# Patient Record
Sex: FEMALE | Race: WHITE | Employment: UNEMPLOYED | URBAN - METROPOLITAN AREA
[De-identification: names, ages, dates, MRNs, and addresses within clinical notes are randomized per-mention and may not be internally consistent; named-entity substitution may affect disease eponyms.]

---

## 2017-04-28 RX ORDER — ALBUTEROL SULFATE 90 UG/1
2 AEROSOL, METERED RESPIRATORY (INHALATION) EVERY 6 HOURS PRN
Qty: 1 INHALER | Refills: 5 | Status: SHIPPED | OUTPATIENT
Start: 2017-04-28 | End: 2017-11-01 | Stop reason: ALTCHOICE

## 2017-05-04 ENCOUNTER — OFFICE VISIT (OUTPATIENT)
Dept: VASCULAR SURGERY | Age: 62
End: 2017-05-04

## 2017-05-04 VITALS
DIASTOLIC BLOOD PRESSURE: 78 MMHG | HEIGHT: 66 IN | SYSTOLIC BLOOD PRESSURE: 130 MMHG | BODY MASS INDEX: 31.82 KG/M2 | WEIGHT: 198 LBS

## 2017-05-04 DIAGNOSIS — I87.2 VENOUS INSUFFICIENCY OF BOTH LOWER EXTREMITIES: Primary | ICD-10-CM

## 2017-05-04 PROCEDURE — 99214 OFFICE O/P EST MOD 30 MIN: CPT | Performed by: SURGERY

## 2017-08-31 ENCOUNTER — OFFICE VISIT (OUTPATIENT)
Dept: VASCULAR SURGERY | Age: 62
End: 2017-08-31

## 2017-08-31 VITALS
SYSTOLIC BLOOD PRESSURE: 110 MMHG | BODY MASS INDEX: 32.47 KG/M2 | HEIGHT: 66 IN | DIASTOLIC BLOOD PRESSURE: 70 MMHG | WEIGHT: 202 LBS

## 2017-08-31 PROCEDURE — 29580 STRAPPING UNNA BOOT: CPT | Performed by: SURGERY

## 2017-09-14 ENCOUNTER — OFFICE VISIT (OUTPATIENT)
Dept: VASCULAR SURGERY | Age: 62
End: 2017-09-14

## 2017-09-14 VITALS
DIASTOLIC BLOOD PRESSURE: 70 MMHG | BODY MASS INDEX: 32.47 KG/M2 | WEIGHT: 202 LBS | HEIGHT: 66 IN | SYSTOLIC BLOOD PRESSURE: 138 MMHG

## 2017-09-14 PROCEDURE — 29580 STRAPPING UNNA BOOT: CPT | Performed by: SURGERY

## 2017-09-15 ENCOUNTER — TELEPHONE (OUTPATIENT)
Dept: VASCULAR SURGERY | Age: 62
End: 2017-09-15

## 2017-11-01 ENCOUNTER — TELEPHONE (OUTPATIENT)
Dept: VASCULAR SURGERY | Age: 62
End: 2017-11-01

## 2017-11-01 NOTE — TELEPHONE ENCOUNTER
The patient showed up in the office today 2 weeks late for her appt. Her left leg was wrapped very poorly in an ace wrap. When I unwrapped it the leg was extremely red, excoriated, and foul smelling green drainage. She is afebrile. She asked if she should go to ER. I called Dr Atif Tejeda to express my concerns and he feels she may need to go to ER. She prefers [place]. Her Access bus is not allowed to transfer her and a cab will cost her $20. She does not have the money. I am going to let her go home by her Access service and she will need to get a ride to hospital from there.

## 2017-11-07 ENCOUNTER — TELEPHONE (OUTPATIENT)
Dept: VASCULAR SURGERY | Age: 62
End: 2017-11-07

## 2017-11-07 NOTE — TELEPHONE ENCOUNTER
I received a call from a nurse with [facility]. Apparently they were at the home to see [place] and she was place on Doxycycline 100 mg for cellulitis. I thought she was going to [address] [place] [address] over the wknd but apparently not. I expressed my concerns of how it looked and smelled last Wednesday. A lot of drainage and foul smelling. She felt this information was very helpful and will relay this to Dr Marisabel Cannon, the visiting physician.

## 2018-02-08 LAB
AVERAGE GLUCOSE: NORMAL
HBA1C MFR BLD: 8.5 %

## 2018-04-17 ENCOUNTER — TELEPHONE (OUTPATIENT)
Dept: PULMONOLOGY | Age: 63
End: 2018-04-17

## 2018-04-17 DIAGNOSIS — J44.9 COPD, SEVERITY TO BE DETERMINED (HCC): Primary | ICD-10-CM

## 2018-05-10 ENCOUNTER — OFFICE VISIT (OUTPATIENT)
Dept: VASCULAR SURGERY | Age: 63
End: 2018-05-10

## 2018-05-10 VITALS
DIASTOLIC BLOOD PRESSURE: 70 MMHG | BODY MASS INDEX: 31.66 KG/M2 | HEIGHT: 66 IN | WEIGHT: 197 LBS | SYSTOLIC BLOOD PRESSURE: 122 MMHG

## 2018-05-10 DIAGNOSIS — I87.2 VENOUS INSUFFICIENCY OF BOTH LOWER EXTREMITIES: Primary | ICD-10-CM

## 2018-05-10 DIAGNOSIS — I89.0 LYMPHEDEMA OF BOTH LOWER EXTREMITIES: ICD-10-CM

## 2018-05-10 PROCEDURE — G8417 CALC BMI ABV UP PARAM F/U: HCPCS | Performed by: SURGERY

## 2018-05-10 PROCEDURE — 4004F PT TOBACCO SCREEN RCVD TLK: CPT | Performed by: SURGERY

## 2018-05-10 PROCEDURE — 3017F COLORECTAL CA SCREEN DOC REV: CPT | Performed by: SURGERY

## 2018-05-10 PROCEDURE — 99213 OFFICE O/P EST LOW 20 MIN: CPT | Performed by: SURGERY

## 2018-05-10 PROCEDURE — G8427 DOCREV CUR MEDS BY ELIG CLIN: HCPCS | Performed by: SURGERY

## 2018-05-10 RX ORDER — OMEPRAZOLE 40 MG/1
40 CAPSULE, DELAYED RELEASE ORAL DAILY
Status: ON HOLD | COMMUNITY
End: 2019-05-12 | Stop reason: CLARIF

## 2018-05-10 RX ORDER — TRAZODONE HYDROCHLORIDE 50 MG/1
50 TABLET ORAL NIGHTLY
Status: ON HOLD | COMMUNITY
End: 2019-05-12 | Stop reason: CLARIF

## 2018-05-10 RX ORDER — FUROSEMIDE 20 MG/1
20 TABLET ORAL 2 TIMES DAILY
Status: ON HOLD | COMMUNITY
End: 2019-11-13 | Stop reason: HOSPADM

## 2018-05-10 RX ORDER — ATORVASTATIN CALCIUM 10 MG/1
10 TABLET, FILM COATED ORAL NIGHTLY
COMMUNITY

## 2018-05-10 RX ORDER — WARFARIN SODIUM 3 MG/1
3 TABLET ORAL EVERY EVENING
Status: ON HOLD | COMMUNITY
End: 2019-05-14 | Stop reason: HOSPADM

## 2018-05-23 ENCOUNTER — HOSPITAL ENCOUNTER (OUTPATIENT)
Dept: PULMONOLOGY | Age: 63
Discharge: HOME | End: 2018-05-23
Attending: INTERNAL MEDICINE | Admitting: INTERNAL MEDICINE

## 2018-05-23 DIAGNOSIS — J44.9 CHRONIC OBSTRUCTIVE PULMONARY DISEASE (HCC): ICD-10-CM

## 2018-05-23 RX ORDER — ALBUTEROL SULFATE 2.5 MG/3ML
2.5 SOLUTION RESPIRATORY (INHALATION) ONCE
Status: COMPLETED | OUTPATIENT
Start: 2018-05-23 | End: 2018-05-23

## 2018-05-23 RX ADMIN — ALBUTEROL SULFATE 2.5 MG: 2.5 SOLUTION RESPIRATORY (INHALATION) at 11:53

## 2018-06-04 ENCOUNTER — TELEPHONE (OUTPATIENT)
Dept: PULMONOLOGY | Age: 63
End: 2018-06-04

## 2018-06-04 ENCOUNTER — OFFICE VISIT (OUTPATIENT)
Dept: PULMONOLOGY | Age: 63
End: 2018-06-04

## 2018-06-04 VITALS
WEIGHT: 206 LBS | OXYGEN SATURATION: 98 % | SYSTOLIC BLOOD PRESSURE: 110 MMHG | HEIGHT: 66 IN | DIASTOLIC BLOOD PRESSURE: 70 MMHG | BODY MASS INDEX: 33.11 KG/M2 | HEART RATE: 88 BPM

## 2018-06-04 DIAGNOSIS — J44.9 COPD, SEVERITY TO BE DETERMINED (HCC): Primary | ICD-10-CM

## 2018-06-04 DIAGNOSIS — Z72.0 TOBACCO USE: ICD-10-CM

## 2018-06-04 PROCEDURE — G8417 CALC BMI ABV UP PARAM F/U: HCPCS | Performed by: INTERNAL MEDICINE

## 2018-06-04 PROCEDURE — 4004F PT TOBACCO SCREEN RCVD TLK: CPT | Performed by: INTERNAL MEDICINE

## 2018-06-04 PROCEDURE — G8926 SPIRO NO PERF OR DOC: HCPCS | Performed by: INTERNAL MEDICINE

## 2018-06-04 PROCEDURE — 99214 OFFICE O/P EST MOD 30 MIN: CPT | Performed by: INTERNAL MEDICINE

## 2018-06-04 PROCEDURE — 3023F SPIROM DOC REV: CPT | Performed by: INTERNAL MEDICINE

## 2018-06-04 PROCEDURE — G8427 DOCREV CUR MEDS BY ELIG CLIN: HCPCS | Performed by: INTERNAL MEDICINE

## 2018-06-04 PROCEDURE — 3017F COLORECTAL CA SCREEN DOC REV: CPT | Performed by: INTERNAL MEDICINE

## 2018-06-04 RX ORDER — FLUTICASONE FUROATE AND VILANTEROL 200; 25 UG/1; UG/1
1 POWDER RESPIRATORY (INHALATION) DAILY
Qty: 1 EACH | Refills: 11 | Status: ON HOLD | OUTPATIENT
Start: 2018-06-04 | End: 2019-09-18

## 2018-07-21 ENCOUNTER — APPOINTMENT (OUTPATIENT)
Dept: GENERAL RADIOLOGY | Age: 63
End: 2018-07-21
Payer: COMMERCIAL

## 2018-07-21 ENCOUNTER — HOSPITAL ENCOUNTER (EMERGENCY)
Age: 63
Discharge: HOME | End: 2018-07-21
Attending: EMERGENCY MEDICINE
Payer: COMMERCIAL

## 2018-07-21 VITALS
WEIGHT: 190 LBS | TEMPERATURE: 97.5 F | RESPIRATION RATE: 16 BRPM | OXYGEN SATURATION: 99 % | HEIGHT: 65 IN | HEART RATE: 89 BPM | SYSTOLIC BLOOD PRESSURE: 94 MMHG | DIASTOLIC BLOOD PRESSURE: 66 MMHG | BODY MASS INDEX: 31.65 KG/M2

## 2018-07-21 DIAGNOSIS — R11.2 NAUSEA AND VOMITING, INTRACTABILITY OF VOMITING NOT SPECIFIED, UNSPECIFIED VOMITING TYPE: Primary | ICD-10-CM

## 2018-07-21 DIAGNOSIS — N39.0 URINARY TRACT INFECTION WITHOUT HEMATURIA, SITE UNSPECIFIED: ICD-10-CM

## 2018-07-21 DIAGNOSIS — K59.00 CONSTIPATION, UNSPECIFIED CONSTIPATION TYPE: ICD-10-CM

## 2018-07-21 LAB
ALBUMIN SERPL-MCNC: 3.7 G/DL (ref 3.4–5)
ALP BLD-CCNC: 162 U/L (ref 40–129)
ALT SERPL-CCNC: 18 U/L (ref 10–40)
AST SERPL-CCNC: 20 U/L (ref 15–37)
BACTERIA: ABNORMAL /HPF
BASE EXCESS VENOUS: 15 (ref -3–3)
BASOPHILS ABSOLUTE: 0 K/UL (ref 0–0.2)
BASOPHILS RELATIVE PERCENT: 0.4 %
BILIRUB SERPL-MCNC: <0.2 MG/DL (ref 0–1)
BILIRUBIN DIRECT: <0.2 MG/DL (ref 0–0.3)
BILIRUBIN URINE: NEGATIVE MG/DL
BILIRUBIN, INDIRECT: ABNORMAL MG/DL (ref 0–1)
BLOOD, URINE: ABNORMAL
CALCIUM IONIZED: 1.07 MMOL/L (ref 1.12–1.32)
CLARITY: ABNORMAL
CO2: 33 MMOL/L (ref 21–32)
COLOR: ABNORMAL
EOSINOPHILS ABSOLUTE: 0.1 K/UL (ref 0–0.6)
EOSINOPHILS RELATIVE PERCENT: 0.8 %
GFR AFRICAN AMERICAN: >60
GFR NON-AFRICAN AMERICAN: >60
GLUCOSE BLD-MCNC: 225 MG/DL (ref 70–99)
GLUCOSE URINE: NEGATIVE MG/DL
HCO3 VENOUS: 38.1 MMOL/L (ref 23–29)
HCT VFR BLD CALC: 40.3 % (ref 36–48)
HEMOGLOBIN: 13.2 G/DL (ref 12–16)
KETONES, URINE: NEGATIVE MG/DL
LACTATE: 2.26 MMOL/L (ref 0.4–2)
LACTATE: 2.3 MMOL/L (ref 0.4–2)
LEUKOCYTE ESTERASE, URINE: ABNORMAL
LIPASE: 30 U/L (ref 13–60)
LYMPHOCYTES ABSOLUTE: 2.2 K/UL (ref 1–5.1)
LYMPHOCYTES RELATIVE PERCENT: 23.9 %
MCH RBC QN AUTO: 29.8 PG (ref 26–34)
MCHC RBC AUTO-ENTMCNC: 32.7 G/DL (ref 31–36)
MCV RBC AUTO: 90.9 FL (ref 80–100)
MICROSCOPIC EXAMINATION: YES
MONOCYTES ABSOLUTE: 0.9 K/UL (ref 0–1.3)
MONOCYTES RELATIVE PERCENT: 9.7 %
NEUTROPHILS ABSOLUTE: 5.9 K/UL (ref 1.7–7.7)
NEUTROPHILS RELATIVE PERCENT: 65.2 %
NITRITE, URINE: NEGATIVE
O2 SAT, VEN: 36 %
PCO2, VEN: 50.2 MM HG (ref 40–50)
PDW BLD-RTO: 17.7 % (ref 12.4–15.4)
PERFORMED ON: ABNORMAL
PERFORMED ON: NORMAL
PERFORMED ON: NORMAL
PH UA: 8
PH VENOUS: 7.49 (ref 7.35–7.45)
PLATELET # BLD: 248 K/UL (ref 135–450)
PMV BLD AUTO: 8.8 FL (ref 5–10.5)
PO2, VEN: 20 MM HG
POC ANION GAP: 10 (ref 10–20)
POC BUN: 16 MG/DL (ref 7–18)
POC CHLORIDE: 94 MMOL/L (ref 99–110)
POC CREATININE: 0.9 MG/DL (ref 0.6–1.2)
POC POTASSIUM: 4.2 MMOL/L (ref 3.5–5.1)
POC SAMPLE TYPE: ABNORMAL
POC SAMPLE TYPE: NORMAL
POC SAMPLE TYPE: NORMAL
POC SODIUM: 137 MMOL/L (ref 136–145)
POC TROPONIN I: 0 NG/ML (ref 0–0.1)
POC TROPONIN I: 0 NG/ML (ref 0–0.1)
PROTEIN UA: NEGATIVE MG/DL
RBC # BLD: 4.44 M/UL (ref 4–5.2)
RBC UA: ABNORMAL /HPF (ref 0–2)
SPECIFIC GRAVITY UA: 1.01
TCO2 CALC VENOUS: 40 MMOL/L
TOTAL PROTEIN: 7.1 G/DL (ref 6.4–8.2)
UROBILINOGEN, URINE: 0.2 E.U./DL
WBC # BLD: 9 K/UL (ref 4–11)
WBC UA: ABNORMAL /HPF (ref 0–5)

## 2018-07-21 PROCEDURE — 85025 COMPLETE CBC W/AUTO DIFF WBC: CPT

## 2018-07-21 PROCEDURE — 80047 BASIC METABLC PNL IONIZED CA: CPT

## 2018-07-21 PROCEDURE — 84484 ASSAY OF TROPONIN QUANT: CPT

## 2018-07-21 PROCEDURE — 96361 HYDRATE IV INFUSION ADD-ON: CPT

## 2018-07-21 PROCEDURE — 74022 RADEX COMPL AQT ABD SERIES: CPT

## 2018-07-21 PROCEDURE — 83605 ASSAY OF LACTIC ACID: CPT

## 2018-07-21 PROCEDURE — 80076 HEPATIC FUNCTION PANEL: CPT

## 2018-07-21 PROCEDURE — 87086 URINE CULTURE/COLONY COUNT: CPT

## 2018-07-21 PROCEDURE — 6370000000 HC RX 637 (ALT 250 FOR IP): Performed by: EMERGENCY MEDICINE

## 2018-07-21 PROCEDURE — 93005 ELECTROCARDIOGRAM TRACING: CPT | Performed by: EMERGENCY MEDICINE

## 2018-07-21 PROCEDURE — 82803 BLOOD GASES ANY COMBINATION: CPT

## 2018-07-21 PROCEDURE — 81001 URINALYSIS AUTO W/SCOPE: CPT

## 2018-07-21 PROCEDURE — 2580000003 HC RX 258: Performed by: EMERGENCY MEDICINE

## 2018-07-21 PROCEDURE — 6360000002 HC RX W HCPCS: Performed by: EMERGENCY MEDICINE

## 2018-07-21 PROCEDURE — 96374 THER/PROPH/DIAG INJ IV PUSH: CPT

## 2018-07-21 PROCEDURE — 99284 EMERGENCY DEPT VISIT MOD MDM: CPT

## 2018-07-21 PROCEDURE — 83690 ASSAY OF LIPASE: CPT

## 2018-07-21 RX ORDER — CEPHALEXIN 500 MG/1
500 CAPSULE ORAL 4 TIMES DAILY
Qty: 12 CAPSULE | Refills: 0 | Status: SHIPPED | OUTPATIENT
Start: 2018-07-21 | End: 2018-07-24

## 2018-07-21 RX ORDER — ONDANSETRON 2 MG/ML
4 INJECTION INTRAMUSCULAR; INTRAVENOUS ONCE
Status: COMPLETED | OUTPATIENT
Start: 2018-07-21 | End: 2018-07-21

## 2018-07-21 RX ORDER — ONDANSETRON 4 MG/1
4 TABLET, FILM COATED ORAL EVERY 8 HOURS PRN
Qty: 20 TABLET | Refills: 0 | Status: ON HOLD | OUTPATIENT
Start: 2018-07-21 | End: 2019-05-12 | Stop reason: CLARIF

## 2018-07-21 RX ORDER — CEPHALEXIN 500 MG/1
500 CAPSULE ORAL ONCE
Status: COMPLETED | OUTPATIENT
Start: 2018-07-21 | End: 2018-07-21

## 2018-07-21 RX ORDER — POLYETHYLENE GLYCOL 3350 17 G/17G
17 POWDER, FOR SOLUTION ORAL DAILY
Qty: 1 BOTTLE | Refills: 0 | Status: SHIPPED | OUTPATIENT
Start: 2018-07-21 | End: 2018-07-28

## 2018-07-21 RX ORDER — 0.9 % SODIUM CHLORIDE 0.9 %
30 INTRAVENOUS SOLUTION INTRAVENOUS ONCE
Status: COMPLETED | OUTPATIENT
Start: 2018-07-21 | End: 2018-07-21

## 2018-07-21 RX ADMIN — ONDANSETRON 4 MG: 2 INJECTION INTRAMUSCULAR; INTRAVENOUS at 20:35

## 2018-07-21 RX ADMIN — SODIUM CHLORIDE 2586 ML: 9 INJECTION, SOLUTION INTRAVENOUS at 18:18

## 2018-07-21 RX ADMIN — CEPHALEXIN 500 MG: 500 CAPSULE ORAL at 20:43

## 2018-07-21 ASSESSMENT — ENCOUNTER SYMPTOMS
SHORTNESS OF BREATH: 0
CONSTIPATION: 0
ABDOMINAL PAIN: 1
NAUSEA: 1
COUGH: 0
VOMITING: 1
DIARRHEA: 0

## 2018-07-21 NOTE — ED PROVIDER NOTES
that she does not drink alcohol or use drugs. Medications     Previous Medications    ARIPIPRAZOLE (ABILIFY) 5 MG TABLET    Take 5 mg by mouth nightly. ATORVASTATIN (LIPITOR) 10 MG TABLET    Take 10 mg by mouth daily    CLOZAPINE (CLOZARIL) 100 MG TABLET    Take 200 mg by mouth 2 times daily. FERROUS SULFATE 325 (65 FE) MG TABLET    Take 325 mg by mouth daily (with breakfast)    FLUTICASONE FUROATE-VILANTEROL 200-25 MCG/INH AEPB    Inhale 1 puff into the lungs daily    FUROSEMIDE (LASIX) 20 MG TABLET    Take 20 mg by mouth 2 times daily    GLIMEPIRIDE (AMARYL) 1 MG TABLET    Take 1 mg by mouth every morning (before breakfast)    LEVOTHYROXINE (SYNTHROID) 50 MCG TABLET    Take 50 mcg by mouth Daily    LISINOPRIL (PRINIVIL;ZESTRIL) 2.5 MG TABLET    Take 2.5 mg by mouth daily    OMEPRAZOLE (PRILOSEC) 20 MG DELAYED RELEASE CAPSULE    Take 20 mg by mouth daily    SPIRONOLACTONE (ALDACTONE) 25 MG TABLET    Take 1 tablet by mouth daily    THERAPEUTIC MULTIVITAMIN-MINERALS (THERAGRAN-M) TABLET    Take 1 tablet by mouth daily. TIOTROPIUM (SPIRIVA RESPIMAT) 2.5 MCG/ACT AERS INHALER    Inhale 2 puffs into the lungs daily    TRAZODONE (DESYREL) 50 MG TABLET    Take 50 mg by mouth nightly    VALPROIC ACID (DEPAKENE) 250 MG/5ML SYRP    Take 500 mg by mouth nightly. VITAMIN B-12 (CYANOCOBALAMIN) 1000 MCG TABLET    Take 1,000 mcg by mouth daily    VITAMIN D (CHOLECALCIFEROL) 1000 UNITS CAPS CAPSULE    Take 2,000 Units by mouth nightly. WARFARIN (COUMADIN) 2.5 MG TABLET    Take 2.5 mg by mouth       Allergies     She has No Known Allergies. Physical Exam     INITIAL VITALS: BP: 91/69, Temp: 97.5 °F (36.4 °C), Pulse: 99, Resp: 17, SpO2: 99 %   Physical Exam   Constitutional: She is oriented to person, place, and time. She appears well-developed and well-nourished. No distress. Cardiovascular: Normal rate and regular rhythm. Pulmonary/Chest: Effort normal and breath sounds normal.   Abdominal: Soft.  Bowel Lipase 30.0 13.0 - 60.0 U/L   Microscopic Urinalysis   Result Value Ref Range    WBC, UA 10-20 (A) 0 - 5 /HPF    RBC, UA 3-5 (A) 0 - 2 /HPF    Bacteria, UA 1+ (A) /HPF   POCT Venous   Result Value Ref Range    pH, Stevie 7.488 (H) 7.350 - 7.450    pCO2, Stevie 50.2 (H) 40.0 - 50.0 mm Hg    pO2, Stevie 20 Not Established mm Hg    HCO3, Venous 38.1 (H) 23.0 - 29.0 mmol/L    Base Excess, Stevie 15 (H) -3 - 3    O2 Sat, Stevie 36 Not Established %    TC02 (Calc), Stevie 40 Not Established mmol/L    Lactate 2.30 (H) 0.40 - 2.00 mmol/L    Sample Type STEVIE     Performed on SEE BELOW    POCT Venous   Result Value Ref Range    POC Sodium 137 136 - 145 mmol/L    POC Potassium 4.2 3.5 - 5.1 mmol/L    POC Chloride 94 (L) 99 - 110 mmol/L    CO2 33 (H) 21 - 32 mmol/L    POC Anion Gap 10 10 - 20    POC Glucose 225 (H) 70 - 99 mg/dl    POC BUN 16 7 - 18 mg/dL    POC Creatinine 0.9 0.6 - 1.2 mg/dL    GFR Non-African American >60 >60    GFR African American >60     Calcium, Ion 1.07 (L) 1.12 - 1.32 mmol/L    Sample Type STEVIE     Performed on SEE BELOW    POCT Venous   Result Value Ref Range    POC Troponin I 0.00 0.00 - 0.10 ng/mL    Sample Type STEVIE     Performed on SEE BELOW    POC URINE with Microscopic   Result Value Ref Range    Color, UA Not Entered Straw/Yellow    Clarity, UA Not Entered Clear    Glucose, Ur Negative Negative mg/dL    Bilirubin Urine Negative Negative mg/dL    Ketones, Urine Negative Negative mg/dL    Specific Gravity, UA 1.015 1.005 - 1.030    Blood, Urine TRACE-INTACT (A) Negative    pH, UA 8.0 5.0 - 8.0    Protein, UA Negative Negative mg/dL    Urobilinogen, Urine 0.2 <2.0 E.U./dL    Nitrite, Urine Negative Negative    Leukocyte Esterase, Urine SMALL (A) Negative    Microscopic Examination Yes    POCT Venous   Result Value Ref Range    Lactate 2.26 (H) 0.40 - 2.00 mmol/L    Sample Type STEVIE     Performed on SEE BELOW    POCT Venous   Result Value Ref Range    POC Troponin I 0.00 0.00 - 0.10 ng/mL    Sample Type STEVIE     Performed on SEE BELOW        RECENT VITALS:  BP: 91/69, Temp: 97.5 °F (36.4 °C), Pulse: 99, Resp: 17, SpO2: 99 %       ED Course     Nursing Notes, Past Medical Hx, Past Surgical Hx, Social Hx, Allergies, and Family Hx were reviewed. The patient was given the following medications:  Orders Placed This Encounter   Medications    0.9 % sodium chloride IV bolus 2,586 mL    ondansetron (ZOFRAN) injection 4 mg    cephALEXin (KEFLEX) capsule 500 mg    cephALEXin (KEFLEX) 500 MG capsule     Sig: Take 1 capsule by mouth 4 times daily for 3 days     Dispense:  12 capsule     Refill:  0    ondansetron (ZOFRAN) 4 MG tablet     Sig: Take 1 tablet by mouth every 8 hours as needed for Nausea     Dispense:  20 tablet     Refill:  0    polyethylene glycol (MIRALAX) powder     Sig: Take 17 g by mouth daily for 7 days PRN constipation     Dispense:  1 Bottle     Refill:  0       CONSULTS:  None    MEDICAL DECISION MAKING / ASSESSMENT / Talha Jamison is a 63 y.o. female presenting with nausea and vomiting and abdominal discomfort with a very benign exam.  She was found to have a large amount of stool on xray but denies constipation. She has normal lab evaluation and unremarkable ECG/tropx2. Her chest pain appears to really be abdominal discomfort which I think is related to constipation. She has a UTI and tolerated her first dose of antibiotics and did not vomit. She feels comfortable going home and is asking for a cab. She will be prescribed zofran for the nausea. Clinical Impression     1. Nausea and vomiting, intractability of vomiting not specified, unspecified vomiting type    2. Constipation, unspecified constipation type    3.  Urinary tract infection without hematuria, site unspecified        Disposition     PATIENT REFERRED TO:  Lili Pritchard, APRN - CNP  [address] [facility] [address]  [phone]      in the next few days      DISCHARGE MEDICATIONS:  New Prescriptions    CEPHALEXIN (KEFLEX) 500 MG

## 2018-07-21 NOTE — ED NOTES
Bed: A12-12  Expected date:   Expected time:   Means of arrival:   Comments:  Libby Goldsmith RN  07/21/18 [id number]

## 2018-07-23 LAB — URINE CULTURE, ROUTINE: NORMAL

## 2018-08-15 LAB
EKG ATRIAL RATE: 108 BPM
EKG DIAGNOSIS: NORMAL
EKG P AXIS: 55 DEGREES
EKG P-R INTERVAL: 130 MS
EKG Q-T INTERVAL: 354 MS
EKG QRS DURATION: 88 MS
EKG QTC CALCULATION (BAZETT): 474 MS
EKG R AXIS: -71 DEGREES
EKG T AXIS: 61 DEGREES
EKG VENTRICULAR RATE: 108 BPM

## 2018-09-22 ENCOUNTER — APPOINTMENT (OUTPATIENT)
Dept: GENERAL RADIOLOGY | Age: 63
End: 2018-09-22
Payer: COMMERCIAL

## 2018-09-22 ENCOUNTER — APPOINTMENT (OUTPATIENT)
Dept: CT IMAGING | Age: 63
End: 2018-09-22
Payer: COMMERCIAL

## 2018-09-22 ENCOUNTER — HOSPITAL ENCOUNTER (EMERGENCY)
Age: 63
Discharge: HOME | End: 2018-09-22
Attending: EMERGENCY MEDICINE
Payer: COMMERCIAL

## 2018-09-22 VITALS
RESPIRATION RATE: 18 BRPM | OXYGEN SATURATION: 99 % | TEMPERATURE: 98 F | DIASTOLIC BLOOD PRESSURE: 49 MMHG | SYSTOLIC BLOOD PRESSURE: 118 MMHG | HEART RATE: 97 BPM
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
## 2018-09-22 DIAGNOSIS — M25.561 CHRONIC PAIN OF BOTH KNEES: ICD-10-CM

## 2018-09-22 DIAGNOSIS — M79.642 PAIN IN BOTH HANDS: ICD-10-CM

## 2018-09-22 DIAGNOSIS — M79.641 PAIN IN BOTH HANDS: ICD-10-CM

## 2018-09-22 DIAGNOSIS — S09.90XA INJURY OF HEAD, INITIAL ENCOUNTER: ICD-10-CM

## 2018-09-22 DIAGNOSIS — G89.29 CHRONIC PAIN OF BOTH KNEES: ICD-10-CM

## 2018-09-22 DIAGNOSIS — M25.562 CHRONIC PAIN OF BOTH KNEES: ICD-10-CM

## 2018-09-22 PROCEDURE — 93005 ELECTROCARDIOGRAM TRACING: CPT | Performed by: EMERGENCY MEDICINE

## 2018-09-22 PROCEDURE — 72125 CT NECK SPINE W/O DYE: CPT

## 2018-09-22 PROCEDURE — 6370000000 HC RX 637 (ALT 250 FOR IP): Performed by: EMERGENCY MEDICINE

## 2018-09-22 PROCEDURE — 73560 X-RAY EXAM OF KNEE 1 OR 2: CPT

## 2018-09-22 PROCEDURE — 73130 X-RAY EXAM OF HAND: CPT

## 2018-09-22 PROCEDURE — 99285 EMERGENCY DEPT VISIT HI MDM: CPT

## 2018-09-22 PROCEDURE — 70450 CT HEAD/BRAIN W/O DYE: CPT

## 2018-09-22 RX ORDER — ACETAMINOPHEN 500 MG
1000 TABLET ORAL ONCE
Status: COMPLETED | OUTPATIENT
Start: 2018-09-22 | End: 2018-09-22

## 2018-09-22 RX ADMIN — ACETAMINOPHEN 1000 MG: 500 TABLET, COATED ORAL at 12:16

## 2018-09-22 ASSESSMENT — PAIN DESCRIPTION - DESCRIPTORS: DESCRIPTORS: ACHING

## 2018-09-22 ASSESSMENT — ENCOUNTER SYMPTOMS
ABDOMINAL PAIN: 0
DIARRHEA: 0
VOMITING: 0
SHORTNESS OF BREATH: 0
NAUSEA: 0
COUGH: 0
BACK PAIN: 0
RHINORRHEA: 0

## 2018-09-22 ASSESSMENT — PAIN SCALES - GENERAL: PAINLEVEL_OUTOF10: 10

## 2018-09-22 ASSESSMENT — PAIN DESCRIPTION - LOCATION: LOCATION: HEAD

## 2018-09-22 ASSESSMENT — PAIN DESCRIPTION - FREQUENCY: FREQUENCY: CONTINUOUS

## 2018-09-22 ASSESSMENT — PAIN DESCRIPTION - PAIN TYPE: TYPE: ACUTE PAIN

## 2018-09-22 NOTE — ED NOTES
Bed: B16-16  Expected date:   Expected time:   Means of arrival:   Comments:  [address], RN  09/22/18 0238
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
## 2018-09-22 NOTE — ED PROVIDER NOTES
[address] ENCOUNTER          ATTENDING PHYSICIAN NOTE     Date of evaluation: 9/22/2018    Chief Complaint     Fall      History of Present Illness     Tracee Swanson is a 63 y.o. female with a history of schizophrenia, DM2, COPD, and CKD who presents after a fall. Thinks tripped on clothing walking through living room. Hit head on wall. Denies LOC. Complaining of headache, neck pain, B hand, and B knee pain. EMS evaluated and initially refused transport. ?second fall, patient does not recall. EMS called again and brought to ED. Denies fever, chills, chest pain, dyspnea, abdominal pain, nausea, vomiting, BM changes, urinary symptoms, or other complaints. Review of Systems     Review of Systems   Constitutional: Negative for chills and fever. HENT: Negative for congestion and rhinorrhea. Respiratory: Negative for cough and shortness of breath. Cardiovascular: Negative for chest pain and palpitations. Gastrointestinal: Negative for abdominal pain, diarrhea, nausea and vomiting. Genitourinary: Negative for dysuria, frequency and urgency. Musculoskeletal: Positive for arthralgias and neck pain. Negative for back pain. Skin: Negative for rash and wound. Neurological: Positive for headaches. Negative for syncope. Psychiatric/Behavioral: Negative for agitation and confusion. Past Medical, Surgical, Family, and Social History     She has a past medical history of Chronic mental illness; Chronic pain; CKD (chronic kidney disease) stage 1, GFR 90 ml/min or greater; COPD (chronic obstructive pulmonary disease) ([address] Depression; Diabetes mellitus, type 2 ([address] Diabetic neuropathy ([address] GERD (gastroesophageal reflux disease); History of DVT (deep vein thrombosis); History of primary hyperparathyroidism; Hypertension; Lymphedema of leg; Nasal bone fracture; Normal stress echocardiogram; OA (osteoarthritis); and Schizophrenia ([address]   She has a past surgical history that includes Tubal ligation; Colonoscopy (3/14/2012); and joint replacement (Left). Her Family history is unknown by patient. She reports that she has been smoking Cigarettes. She has been smoking about 0.50 packs per day. She has never used smokeless tobacco. She reports that she does not drink alcohol or use drugs. Medications     Discharge Medication List as of 9/22/2018  2:08 PM      CONTINUE these medications which have NOT CHANGED    Details   ondansetron (ZOFRAN) 4 MG tablet Take 1 tablet by mouth every 8 hours as needed for Nausea, Disp-20 tablet, R-0Print      tiotropium (SPIRIVA RESPIMAT) 2.5 MCG/ACT AERS inhaler Inhale 2 puffs into the lungs daily, Disp-1 Inhaler, R-11Normal      Fluticasone Furoate-Vilanterol 200-25 MCG/INH AEPB Inhale 1 puff into the lungs daily, Disp-1 each, R-11Normal      atorvastatin (LIPITOR) 10 MG tablet Take 10 mg by mouth dailyHistorical Med      furosemide (LASIX) 20 MG tablet Take 20 mg by mouth 2 times dailyHistorical Med      traZODone (DESYREL) 50 MG tablet Take 50 mg by mouth nightlyHistorical Med      omeprazole (PRILOSEC) 20 MG delayed release capsule Take 20 mg by mouth dailyHistorical Med      warfarin (COUMADIN) 2.5 MG tablet Take 2.5 mg by mouthHistorical Med      glimepiride (AMARYL) 1 MG tablet Take 1 mg by mouth every morning (before breakfast)Historical Med      lisinopril (PRINIVIL;ZESTRIL) 2.5 MG tablet Take 2.5 mg by mouth dailyHistorical Med      ferrous sulfate 325 (65 Fe) MG tablet Take 325 mg by mouth daily (with breakfast)Historical Med      vitamin B-12 (CYANOCOBALAMIN) 1000 MCG tablet Take 1,000 mcg by mouth daily      spironolactone (ALDACTONE) 25 MG tablet Take 1 tablet by mouth daily, Disp-30 tablet, R-3      levothyroxine (SYNTHROID) 50 MCG tablet Take 50 mcg by mouth Daily      cloZAPine (CLOZARIL) 100 MG tablet Take 200 mg by mouth 2 times daily. valproic acid (DEPAKENE) 250 MG/5ML SYRP Take 500 mg by mouth nightly. ARIPiprazole (ABILIFY) 5 MG tablet Take 5 mg by mouth nightly. Vitamin D (CHOLECALCIFEROL) 1000 UNITS CAPS capsule Take 2,000 Units by mouth nightly. therapeutic multivitamin-minerals (THERAGRAN-M) tablet Take 1 tablet by mouth daily. Allergies     She has No Known Allergies. Physical Exam     INITIAL VITALS: BP: (!) 118/49, Temp: 98 °F (36.7 °C), Pulse: 97, Resp: 18, SpO2: 99 %   Physical Exam   Constitutional: She is oriented to person, place, and time. She appears well-developed and well-nourished. No distress. HENT:   Head: Normocephalic and atraumatic. Eyes: EOM are normal. No scleral icterus. Neck: Normal range of motion. No tracheal deviation present. Cardiovascular: Normal rate, regular rhythm, normal heart sounds and intact distal pulses. Exam reveals no gallop and no friction rub. No murmur heard. Pulmonary/Chest: Effort normal and breath sounds normal. No respiratory distress. She has no wheezes. She has no rales. Abdominal: Soft. She exhibits no distension. There is no tenderness. There is no rebound and no guarding. Musculoskeletal: Normal range of motion. She exhibits no edema. Mild diffuse B hand TTP  Easily palpable radial pulse  Motor function intact AIN/PIN/ulnar distribution  Sensation intact median/radial/ulnar distribution  Mild diffuse B knee TTP  Normal knee/ankle flexion/extension  SILT throughout   Neurological: She is alert and oriented to person, place, and time. No cranial nerve deficit. Skin: Skin is warm. No rash noted. She is not diaphoretic. Psychiatric: She has a normal mood and affect. Her behavior is normal.   Nursing note and vitals reviewed. Diagnostic Results     EKG   N/A    RADIOLOGY:  XR HAND LEFT (MIN 3 VIEWS)   Final Result      No evidence of acute fracture. XR HAND RIGHT (MIN 3 VIEWS)   Final Result      No evidence of acute fracture. XR KNEE LEFT (1-2 VIEWS)   Final Result      1.   No evidence of acute fracture. 2.  Generalized swelling. XR KNEE RIGHT (1-2 VIEWS)   Final Result      1. No evidence of acute fracture. 2.  Small knee swelling. 3.  Severe degenerative changes. CT CERVICAL SPINE WO CONTRAST   Final Result      No acute bony pathology seen. Significant degenerative changes at C5-C6      CT Head WO Contrast   Final Result      No acute intracranial pathology                  LABS:   No results found for this visit on 09/22/18. ED BEDSIDE ULTRASOUND:  N/A    RECENT VITALS:  BP: (!) 118/49, Temp: 98 °F (36.7 °C), Pulse: 97, Resp: 18, SpO2: 99 %     Procedures     N/A    MEDICAL DECISION MAKING / ASSESSMENT / [place] Bashir is a 63 y.o. female with a history of schizophrenia, DM2, COPD, and CKD who presents after a fall. Symptoms and exam most consistent with contusions. Doubt ICH. Doubt Cspine fracture. Doubt hand/knee fractures. Doubt other significant traumatic injuries. Plan: CT head/Cspine, XR B hand/knee, pain control    ED Course     Nursing Notes, Past Medical Hx, Past Surgical Hx, Social Hx, Allergies, and Family Hx were reviewed. The patient was given the following medications:  Orders Placed This Encounter   Medications    acetaminophen (TYLENOL) tablet 1,000 mg       CONSULTS:  None    ED Course as of Sep 22 1756   Sat Sep 22, 2018   1224 ECG NSR at 91bpm, no ST/T changes though significant artifact  [AZ]   1251 Neg acute CT Head WO Contrast [AZ]   1308 Neg acute CT CERVICAL SPINE WO CONTRAST [AZ]   1308 Neg acute XR KNEE RIGHT (1-2 VIEWS) [AZ]   1308 Neg acute XR KNEE LEFT (1-2 VIEWS) [AZ]   1308 Neg acute XR HAND RIGHT (MIN 3 VIEWS) [AZ]   1308 Neg acute XR HAND LEFT (MIN 3 VIEWS) [AZ]   1321 Patient remains well-appearing. Suspect mechanical fall without significant injury. Will discharge with PCP follow-up.  Verbal and written discharge instructions given to and understood by patient.  Abhishek Sherman      ED Course User Index  [AZ] Dayami Catherine MD Clinical Impression     1. Fall, initial encounter    2. Injury of head, initial encounter    3. Pain in both hands    4.  Chronic pain of both knees        Disposition     PATIENT REFERRED TO:  Sangita Leon, APRN - CNP  [place] [address]  [address] [facility] [address]  [phone]    Schedule an appointment as soon as possible for a visit in 3 days  As needed    [facility] Emergency Department  [address] [place], [place] [id number]  [phone]  Go to   If symptoms worsen -- confusion, significantly increased pain, or other concerns      DISCHARGE MEDICATIONS:  Discharge Medication List as of 9/22/2018  2:08 PM          DISPOSITION Decision To Discharge 09/22/2018 01:17:35 PM      Leonides Ca MD  09/22/18 0936

## 2018-10-08 LAB
EKG ATRIAL RATE: 91 BPM
EKG DIAGNOSIS: NORMAL
EKG P AXIS: 73 DEGREES
EKG P-R INTERVAL: 144 MS
EKG Q-T INTERVAL: 404 MS
EKG QRS DURATION: 90 MS
EKG QTC CALCULATION (BAZETT): 496 MS
EKG R AXIS: -64 DEGREES
EKG T AXIS: 42 DEGREES
EKG VENTRICULAR RATE: 91 BPM

## 2019-02-05 ENCOUNTER — OFFICE VISIT (OUTPATIENT)
Dept: PULMONOLOGY | Age: 64
End: 2019-02-05
Payer: COMMERCIAL

## 2019-02-05 VITALS
HEIGHT: 65 IN | WEIGHT: 207 LBS | DIASTOLIC BLOOD PRESSURE: 70 MMHG | OXYGEN SATURATION: 98 % | BODY MASS INDEX: 34.49 KG/M2 | SYSTOLIC BLOOD PRESSURE: 110 MMHG | HEART RATE: 97 BPM

## 2019-02-05 DIAGNOSIS — J44.9 COPD, SEVERITY TO BE DETERMINED (HCC): Primary | ICD-10-CM

## 2019-02-05 DIAGNOSIS — Z72.0 TOBACCO USE: ICD-10-CM

## 2019-02-05 PROCEDURE — G8417 CALC BMI ABV UP PARAM F/U: HCPCS | Performed by: INTERNAL MEDICINE

## 2019-02-05 PROCEDURE — 4004F PT TOBACCO SCREEN RCVD TLK: CPT | Performed by: INTERNAL MEDICINE

## 2019-02-05 PROCEDURE — G8484 FLU IMMUNIZE NO ADMIN: HCPCS | Performed by: INTERNAL MEDICINE

## 2019-02-05 PROCEDURE — 3023F SPIROM DOC REV: CPT | Performed by: INTERNAL MEDICINE

## 2019-02-05 PROCEDURE — 99214 OFFICE O/P EST MOD 30 MIN: CPT | Performed by: INTERNAL MEDICINE

## 2019-02-05 PROCEDURE — G8926 SPIRO NO PERF OR DOC: HCPCS | Performed by: INTERNAL MEDICINE

## 2019-02-05 PROCEDURE — G8427 DOCREV CUR MEDS BY ELIG CLIN: HCPCS | Performed by: INTERNAL MEDICINE

## 2019-02-05 PROCEDURE — 3017F COLORECTAL CA SCREEN DOC REV: CPT | Performed by: INTERNAL MEDICINE

## 2019-02-05 RX ORDER — NICOTINE 21 MG/24HR
1 PATCH, TRANSDERMAL 24 HOURS TRANSDERMAL EVERY 24 HOURS
Qty: 14 PATCH | Refills: 0 | Status: ON HOLD | OUTPATIENT
Start: 2019-02-05 | End: 2019-05-12 | Stop reason: CLARIF

## 2019-04-04 ENCOUNTER — OFFICE VISIT (OUTPATIENT)
Dept: VASCULAR SURGERY | Age: 64
End: 2019-04-04
Payer: COMMERCIAL

## 2019-04-04 VITALS
SYSTOLIC BLOOD PRESSURE: 136 MMHG | BODY MASS INDEX: 33.43 KG/M2 | WEIGHT: 208 LBS | DIASTOLIC BLOOD PRESSURE: 80 MMHG | HEIGHT: 66 IN

## 2019-04-04 DIAGNOSIS — L97.929 VENOUS HYPERTENSION, CHRONIC, WITH ULCER AND INFLAMMATION, BILATERAL (HCC): Primary | ICD-10-CM

## 2019-04-04 DIAGNOSIS — L97.919 VENOUS HYPERTENSION, CHRONIC, WITH ULCER AND INFLAMMATION, BILATERAL (HCC): Primary | ICD-10-CM

## 2019-04-04 DIAGNOSIS — I87.333 VENOUS HYPERTENSION, CHRONIC, WITH ULCER AND INFLAMMATION, BILATERAL (HCC): Primary | ICD-10-CM

## 2019-04-04 PROCEDURE — 29580 STRAPPING UNNA BOOT: CPT | Performed by: SURGERY

## 2019-04-04 PROCEDURE — 99213 OFFICE O/P EST LOW 20 MIN: CPT | Performed by: SURGERY

## 2019-04-04 PROCEDURE — 3017F COLORECTAL CA SCREEN DOC REV: CPT | Performed by: SURGERY

## 2019-04-04 PROCEDURE — 4004F PT TOBACCO SCREEN RCVD TLK: CPT | Performed by: SURGERY

## 2019-04-04 PROCEDURE — G8427 DOCREV CUR MEDS BY ELIG CLIN: HCPCS | Performed by: SURGERY

## 2019-04-04 PROCEDURE — G8417 CALC BMI ABV UP PARAM F/U: HCPCS | Performed by: SURGERY
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
## 2019-04-15 ENCOUNTER — APPOINTMENT (OUTPATIENT)
Dept: GENERAL RADIOLOGY | Age: 64
End: 2019-04-15
Payer: COMMERCIAL

## 2019-04-15 ENCOUNTER — HOSPITAL ENCOUNTER (EMERGENCY)
Age: 64
Discharge: HOME | End: 2019-04-15
Attending: EMERGENCY MEDICINE
Payer: COMMERCIAL

## 2019-04-15 VITALS
OXYGEN SATURATION: 97 % | WEIGHT: 210 LBS | RESPIRATION RATE: 18 BRPM | HEART RATE: 96 BPM | DIASTOLIC BLOOD PRESSURE: 59 MMHG | SYSTOLIC BLOOD PRESSURE: 105 MMHG | BODY MASS INDEX: 33.75 KG/M2 | TEMPERATURE: 97.8 F | HEIGHT: 66 IN

## 2019-04-15 DIAGNOSIS — I87.2 CHRONIC VENOUS INSUFFICIENCY: Primary | ICD-10-CM

## 2019-04-15 LAB
EKG ATRIAL RATE: 100 BPM
EKG DIAGNOSIS: NORMAL
EKG P AXIS: 67 DEGREES
EKG P-R INTERVAL: 142 MS
EKG Q-T INTERVAL: 368 MS
EKG QRS DURATION: 92 MS
EKG QTC CALCULATION (BAZETT): 474 MS
EKG R AXIS: -71 DEGREES
EKG T AXIS: 67 DEGREES
EKG VENTRICULAR RATE: 100 BPM

## 2019-04-15 PROCEDURE — 73130 X-RAY EXAM OF HAND: CPT

## 2019-04-15 PROCEDURE — 71045 X-RAY EXAM CHEST 1 VIEW: CPT

## 2019-04-15 PROCEDURE — 93005 ELECTROCARDIOGRAM TRACING: CPT | Performed by: EMERGENCY MEDICINE

## 2019-04-15 PROCEDURE — 99283 EMERGENCY DEPT VISIT LOW MDM: CPT

## 2019-04-15 RX ORDER — CEPHALEXIN 500 MG/1
500 CAPSULE ORAL 4 TIMES DAILY
Qty: 40 CAPSULE | Refills: 0 | Status: SHIPPED | OUTPATIENT
Start: 2019-04-15 | End: 2019-04-25

## 2019-04-15 ASSESSMENT — PAIN DESCRIPTION - PAIN TYPE: TYPE: ACUTE PAIN

## 2019-04-15 ASSESSMENT — PAIN SCALES - GENERAL: PAINLEVEL_OUTOF10: 10

## 2019-04-15 NOTE — ED NOTES
Pt was able to ambulate to BRm With came and standby assist. Pt ambulated 25 feet unassisted      Roland Cheadle, RN  04/15/19 4246

## 2019-04-15 NOTE — ED PROVIDER NOTES
[id number] [facility] RESIDENT NOTE       Date of evaluation: 4/15/2019    Chief Complaint     Leg Pain and Hand Pain      History of Present Illness     Gee Aranda is a 66 y.o. female who presents today for evaluation of right thumb pain and right leg pain. Patient states she fell 2 weeks ago and again yesterday. Patient states that she injured her right humb in the fall 2 weeks ago. When asked why she continues to fall she states her legs are so painful that she stumbles often. Patient has hx of profound chronic venous insufficiency and has been non-compliant with treatmnet plans per most recent Vascular surgery notes in EHR. Patient states that her right leg is more swollen than it has been in recent months and painful. She states the pain is 10/10 severity and worse with movement. Nothing makes the pain better. The patient reprots feeling warm but has not taken her temperature recently. No recent nausea, vmoting, chest pain, ORDONEZ or SOB, abdominal pain or changes in baldder/bowel function. Regarding the right thumb pain, the patient states she currently does not have any pain but that it comes at goes. She denies any weakness or difficuylty performing daily tasks due to this pain. She denies any numbness or paresthesias associated. Review of Systems     Review of Systems    See HPI. A full 10-point review of systems wasconducted and is otherwise negative unless noted above.     Past Medical, Surgical, Family, and Social History      She has a past medical history of Chronic mental illness, Chronic pain, CKD (chronic kidney disease) stage 1, GFR 90 ml/min or greater, COPD (chronic obstructive pulmonary disease) ([address] Depression, Diabetes mellitus, type 2 ([address] Diabetic neuropathy ([address] GERD (gastroesophageal reflux disease), History of DVT (deep vein thrombosis), History of primary hyperparathyroidism, Hypertension, Lymphedema of leg, Nasal bone fracture, Normal stress echocardiogram, OA (osteoarthritis), and Schizophrenia ([facility] [address] [facility] She has a past surgical history that includes Tubal ligation; Colonoscopy (3/14/2012); and joint replacement (Left). Her Family history is unknown by patient. She reports that she has been smoking cigarettes. She has been smoking about 0.50 packs per day. She has never used smokeless tobacco. She reports that she does not drink alcohol or use drugs. Medications     Discharge Medication List as of 4/15/2019  4:43 PM      CONTINUE these medications which have NOT CHANGED    Details   nicotine (NICODERM CQ) 14 MG/24HR Place 1 patch onto the skin every 24 hours After two weeks step down to 7 mg nicotine patch. Do not smoke when using nicotine patch, Disp-14 patch, R-0Normal      nicotine (NICODERM CQ) 7 MG/24HR Place 1 patch onto the skin every 24 hours X 2 weeks then stop.  Do not smoke while using nicotine patch, Disp-14 patch, R-0Normal      ondansetron (ZOFRAN) 4 MG tablet Take 1 tablet by mouth every 8 hours as needed for Nausea, Disp-20 tablet, R-0Print      tiotropium (SPIRIVA RESPIMAT) 2.5 MCG/ACT AERS inhaler Inhale 2 puffs into the lungs daily, Disp-1 Inhaler, R-11Normal      Fluticasone Furoate-Vilanterol 200-25 MCG/INH AEPB Inhale 1 puff into the lungs daily, Disp-1 each, R-11Normal      atorvastatin (LIPITOR) 10 MG tablet Take 10 mg by mouth dailyHistorical Med      furosemide (LASIX) 20 MG tablet Take 20 mg by mouth 2 times dailyHistorical Med      traZODone (DESYREL) 50 MG tablet Take 50 mg by mouth nightlyHistorical Med      omeprazole (PRILOSEC) 20 MG delayed release capsule Take 20 mg by mouth dailyHistorical Med      warfarin (COUMADIN) 2.5 MG tablet Take 2.5 mg by mouthHistorical Med      glimepiride (AMARYL) 1 MG tablet Take 1 mg by mouth every morning (before breakfast)Historical Med      lisinopril (PRINIVIL;ZESTRIL) 2.5 MG tablet Take 2.5 mg by mouth dailyHistorical Med      ferrous sulfate 325 (65 Fe) MG tablet Take 325 mg by mouth daily (with breakfast)Historical Med      vitamin B-12 (CYANOCOBALAMIN) 1000 MCG tablet Take 1,000 mcg by mouth daily      spironolactone (ALDACTONE) 25 MG tablet Take 1 tablet by mouth daily, Disp-30 tablet, R-3      levothyroxine (SYNTHROID) 50 MCG tablet Take 50 mcg by mouth Daily      cloZAPine (CLOZARIL) 100 MG tablet Take 200 mg by mouth 2 times daily. valproic acid (DEPAKENE) 250 MG/5ML SYRP Take 500 mg by mouth nightly. ARIPiprazole (ABILIFY) 5 MG tablet Take 5 mg by mouth nightly. Vitamin D (CHOLECALCIFEROL) 1000 UNITS CAPS capsule Take 2,000 Units by mouth nightly. therapeutic multivitamin-minerals (THERAGRAN-M) tablet Take 1 tablet by mouth daily. Allergies     She has No Known Allergies. Physical Exam     INITIAL VITALS: BP: (!) 115/50, Temp: 97.8 °F (36.6 °C), Pulse: 102, Resp: 19, SpO2: 99 %   Physical Exam    General:  disheveled adult female, speaking in complete sentences  HEENT:  Normocephalic, atraumatic, PERRL, extra-ocular movements intact, oropharynx benign  Neck:  Supple, no lymphadenopathy, trachea midline, no masses  Pulmonary:   Clear to auscultation bilaterally, good air movement, no wheezes  Cardiac:  Regular rate and rhythm, no M/R/G  Abdomen:  Soft,non-tender, non-distended, no rebounding or guarding, normoactive borborygmus  Musculoskeletal:  2+ pulses, Mild TTP at base of right humb with full ROM and strength  Skin:  Severe edema of the BLE with symmetric size. Mild increase in erythema of RLE compared to LLE  Neuro: Alert and oriented X 4,able to move all four extremities with equal strength bilaterally, sensory exam grossly intact, cranial nerves II-XII intact  Psych:  Appropriate mood and affect    Diagnostic Results   RADIOLOGY:  XR HAND RIGHT (MIN 3 VIEWS)   Final Result   1. No acute abnormality.       XR CHEST PORTABLE   Final Result      No consolidation          LABS:   Results for orders placed or performed during the hospital encounter of 04/15/19   EKG 12 Lead   Result Value Ref Range    Ventricular Rate 100 BPM    Atrial Rate 100 BPM    P-R Interval 142 ms    QRS Duration 92 ms    Q-T Interval 368 ms    QTc Calculation (Bazett) 474 ms    P Axis 67 degrees    R Axis -71 degrees    T Axis 67 degrees    Diagnosis       EKG performed in ER and to be interpreted by ER physician. Confirmed by MD, APOLONIA ([id number]),  Vianey James (ROMMEL), PARDEEP ([id number]) on 4/15/2019 2:07:08 PM       ED BEDSIDE ULTRASOUND:      RECENT VITALS:  BP: (!) 105/59, Temp: 97.8 °F (36.6 °C), Pulse: 96, Resp: 18, SpO2: 97 %     Procedures       ED Course     Nursing Notes, Past Medical Hx, Past Surgical Hx, Social Hx, Allergies, and Family Hx were reviewed. The patient was given the following medications:  Orders Placed This Encounter   Medications    cephALEXin (KEFLEX) 500 MG capsule     Sig: Take 1 capsule by mouth 4 times daily for 10 days     Dispense:  40 capsule     Refill:  0       CONSULTS:  None    MEDICAL DECISION MAKING / ASSESSMENT / Angeliquelorena Shad is a 66 y.o. female with a history andpresentation as described above in HPI. The patient was evaluated by myself and the ED Attending Physician. All management and disposition plans were discussed and agreed upon. On initial encounter the patient wasin no acute distress with reassuring vitals and no signs of impending hemodynamic or respiratory collapse. Patient presents with two complaints:     Regarding the right humb pain, she has a variable exam with TTP on my exam that was not present when later examined. Xrays without fracture or injury. No TTP over snuffbox. Recoomend follow up with PCP if pain returns. Regarding increasing pain in RLE, she does appear to have erythema in a focal area that is concerning for cellulitis. No crepitus on exam. No increased swelling compared to contra-lateral extremity. Able bear weight and ambulate without assistance.  Will d/c on oral abx with PCP

## 2019-04-15 NOTE — ED PROVIDER NOTES
ED Attending Attestation Note     Date of evaluation: 4/15/2019    This patient was seen by the resident. I have seen and examined the patient, agree with the workup, evaluation, management and diagnosis. The care plan has been discussed. My assessment reveals  A 73-year-old female with chief complaint of leg pain, hand pain. Patient had mechanical fall yesterday, landed on her rright hand and right leg, now with pain. No obvious defformitiiees noted, but tendeernness to palpation over right thumbb. Chronic lower extrremity wounds noted. Sean Moya MD  04/15/19 [id number]

## 2019-05-02 ENCOUNTER — OFFICE VISIT (OUTPATIENT)
Dept: VASCULAR SURGERY | Age: 64
End: 2019-05-02
Payer: COMMERCIAL

## 2019-05-02 ENCOUNTER — HOSPITAL ENCOUNTER (EMERGENCY)
Age: 64
Discharge: HOME | End: 2019-05-02
Attending: EMERGENCY MEDICINE
Payer: COMMERCIAL

## 2019-05-02 VITALS
SYSTOLIC BLOOD PRESSURE: 95 MMHG | HEART RATE: 92 BPM | OXYGEN SATURATION: 99 % | DIASTOLIC BLOOD PRESSURE: 52 MMHG | TEMPERATURE: 98.2 F | RESPIRATION RATE: 18 BRPM

## 2019-05-02 VITALS — HEIGHT: 66 IN | BODY MASS INDEX: 33.43 KG/M2 | WEIGHT: 208 LBS

## 2019-05-02 DIAGNOSIS — L97.929 VENOUS HYPERTENSION, CHRONIC, WITH ULCER AND INFLAMMATION, BILATERAL (HCC): Primary | ICD-10-CM

## 2019-05-02 DIAGNOSIS — I87.2 CHRONIC VENOUS INSUFFICIENCY: Primary | ICD-10-CM

## 2019-05-02 DIAGNOSIS — M79.605 BILATERAL LEG PAIN: ICD-10-CM

## 2019-05-02 DIAGNOSIS — Z91.199 NONCOMPLIANCE: ICD-10-CM

## 2019-05-02 DIAGNOSIS — L97.919 VENOUS HYPERTENSION, CHRONIC, WITH ULCER AND INFLAMMATION, BILATERAL (HCC): Primary | ICD-10-CM

## 2019-05-02 DIAGNOSIS — I87.333 VENOUS HYPERTENSION, CHRONIC, WITH ULCER AND INFLAMMATION, BILATERAL (HCC): Primary | ICD-10-CM

## 2019-05-02 DIAGNOSIS — M79.604 BILATERAL LEG PAIN: ICD-10-CM

## 2019-05-02 DIAGNOSIS — I89.0 LYMPHEDEMA OF BOTH LOWER EXTREMITIES: ICD-10-CM

## 2019-05-02 PROCEDURE — G8427 DOCREV CUR MEDS BY ELIG CLIN: HCPCS | Performed by: SURGERY

## 2019-05-02 PROCEDURE — 29580 STRAPPING UNNA BOOT: CPT | Performed by: SURGERY

## 2019-05-02 PROCEDURE — G8417 CALC BMI ABV UP PARAM F/U: HCPCS | Performed by: SURGERY

## 2019-05-02 PROCEDURE — 4004F PT TOBACCO SCREEN RCVD TLK: CPT | Performed by: SURGERY

## 2019-05-02 PROCEDURE — 99282 EMERGENCY DEPT VISIT SF MDM: CPT

## 2019-05-02 PROCEDURE — 3017F COLORECTAL CA SCREEN DOC REV: CPT | Performed by: SURGERY

## 2019-05-02 ASSESSMENT — PAIN DESCRIPTION - ORIENTATION: ORIENTATION: RIGHT

## 2019-05-02 ASSESSMENT — PAIN DESCRIPTION - FREQUENCY: FREQUENCY: CONTINUOUS

## 2019-05-02 ASSESSMENT — PAIN DESCRIPTION - PAIN TYPE: TYPE: CHRONIC PAIN

## 2019-05-02 ASSESSMENT — PAIN SCALES - GENERAL: PAINLEVEL_OUTOF10: 10
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
## 2019-05-02 NOTE — ED PROVIDER NOTES
ED Attending Attestation Note     Date of evaluation: 5/2/2019    This patient was seen by the resident. I have seen and examined the patient, agree with the workup, evaluation, management and diagnosis. The care plan has been discussed. My assessment reveals 73-year-old female who presents with chief complaint of leg pain. Patient with chronic venous stasis, states she is having worse pressure in her legs today. Legs with chronic wounds but no signs of acute erythema or cellulitis. Patient discharged to follow up with vascular surgery at her appointment today.      Shahbaz Rao MD  05/02/19 [id number]

## 2019-05-02 NOTE — ED NOTES
PT dcd to the lobby to wait on Ly[employer] to go to Dr. Shalini Caal office, Dr. Shalini Caal office made aware and instructed to call [facility] ED for transportation back to her group home after appt if finished.       Emilee Barlow RN  05/02/19 [id number]
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
## 2019-05-02 NOTE — ED TRIAGE NOTES
Pt has chronic leg pain/swelling. Increased pain today. Pt was suppose to see her vascular MD today at 210, but she could not wait and came to the ED.

## 2019-05-12 ENCOUNTER — APPOINTMENT (OUTPATIENT)
Dept: GENERAL RADIOLOGY | Age: 64
DRG: 463 | End: 2019-05-12
Payer: COMMERCIAL

## 2019-05-12 ENCOUNTER — APPOINTMENT (OUTPATIENT)
Dept: CT IMAGING | Age: 64
DRG: 463 | End: 2019-05-12
Payer: COMMERCIAL

## 2019-05-12 ENCOUNTER — HOSPITAL ENCOUNTER (INPATIENT)
Age: 64
LOS: 3 days | Discharge: NURSING, REHAB OR LONG-TERM CARE | DRG: 463 | End: 2019-05-16
Attending: EMERGENCY MEDICINE | Admitting: INTERNAL MEDICINE
Payer: COMMERCIAL

## 2019-05-12 DIAGNOSIS — D69.6 THROMBOCYTOPENIA (HCC): ICD-10-CM
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
## 2019-05-12 DIAGNOSIS — R79.1 INR (INTERNATIONAL NORMAL RATIO) ABNORMAL: Primary | ICD-10-CM

## 2019-05-12 PROBLEM — G93.41 ACUTE METABOLIC ENCEPHALOPATHY: Status: ACTIVE | Noted: 2019-05-12

## 2019-05-12 PROBLEM — N39.0 UTI (URINARY TRACT INFECTION): Status: ACTIVE | Noted: 2019-05-12

## 2019-05-12 PROBLEM — F20.9 SCHIZOPHRENIA (HCC): Status: ACTIVE | Noted: 2019-05-12

## 2019-05-12 LAB
ALBUMIN SERPL-MCNC: 3.8 G/DL (ref 3.4–5)
ALP BLD-CCNC: 168 U/L (ref 40–129)
ALT SERPL-CCNC: 55 U/L (ref 10–40)
ANION GAP SERPL CALCULATED.3IONS-SCNC: 15 MMOL/L (ref 3–16)
AST SERPL-CCNC: 25 U/L (ref 15–37)
BACTERIA: ABNORMAL /HPF
BASOPHILS ABSOLUTE: 0 K/UL (ref 0–0.2)
BASOPHILS RELATIVE PERCENT: 0.3 %
BILIRUB SERPL-MCNC: <0.2 MG/DL (ref 0–1)
BILIRUBIN DIRECT: <0.2 MG/DL (ref 0–0.3)
BILIRUBIN URINE: NEGATIVE
BILIRUBIN, INDIRECT: ABNORMAL MG/DL (ref 0–1)
BLOOD, URINE: NEGATIVE
BUN BLDV-MCNC: 23 MG/DL (ref 7–20)
CALCIUM SERPL-MCNC: 9.5 MG/DL (ref 8.3–10.6)
CHLORIDE BLD-SCNC: 95 MMOL/L (ref 99–110)
CLARITY: CLEAR
CO2: 27 MMOL/L (ref 21–32)
COLOR: YELLOW
CREAT SERPL-MCNC: 0.8 MG/DL (ref 0.6–1.2)
EOSINOPHILS ABSOLUTE: 0.1 K/UL (ref 0–0.6)
EOSINOPHILS RELATIVE PERCENT: 1.4 %
EPITHELIAL CELLS, UA: ABNORMAL /HPF
GFR AFRICAN AMERICAN: >60
GFR NON-AFRICAN AMERICAN: >60
GLUCOSE BLD-MCNC: 216 MG/DL (ref 70–99)
GLUCOSE URINE: NEGATIVE MG/DL
HCT VFR BLD CALC: 35 % (ref 36–48)
HEMOGLOBIN: 11.3 G/DL (ref 12–16)
INR BLD: 12.51 (ref 0.86–1.14)
KETONES, URINE: 15 MG/DL
LEUKOCYTE ESTERASE, URINE: ABNORMAL
LYMPHOCYTES ABSOLUTE: 1 K/UL (ref 1–5.1)
LYMPHOCYTES RELATIVE PERCENT: 15.2 %
MCH RBC QN AUTO: 27.3 PG (ref 26–34)
MCHC RBC AUTO-ENTMCNC: 32.3 G/DL (ref 31–36)
MCV RBC AUTO: 84.4 FL (ref 80–100)
MICROSCOPIC EXAMINATION: YES
MONOCYTES ABSOLUTE: 0.5 K/UL (ref 0–1.3)
MONOCYTES RELATIVE PERCENT: 6.8 %
NEUTROPHILS ABSOLUTE: 5.2 K/UL (ref 1.7–7.7)
NEUTROPHILS RELATIVE PERCENT: 76.3 %
NITRITE, URINE: POSITIVE
PDW BLD-RTO: 19.2 % (ref 12.4–15.4)
PH UA: 6 (ref 5–8)
PLATELET # BLD: 126 K/UL (ref 135–450)
PMV BLD AUTO: 10.1 FL (ref 5–10.5)
POTASSIUM REFLEX MAGNESIUM: 4.5 MMOL/L (ref 3.5–5.1)
PROTEIN UA: ABNORMAL MG/DL
PROTHROMBIN TIME: 142.6 SEC (ref 9.8–13)
RBC # BLD: 4.15 M/UL (ref 4–5.2)
RBC UA: ABNORMAL /HPF (ref 0–2)
SODIUM BLD-SCNC: 137 MMOL/L (ref 136–145)
SPECIFIC GRAVITY UA: 1.02 (ref 1–1.03)
TOTAL PROTEIN: 6.7 G/DL (ref 6.4–8.2)
URINE REFLEX TO CULTURE: YES
URINE TYPE: ABNORMAL
UROBILINOGEN, URINE: 0.2 E.U./DL
VALPROIC ACID LEVEL: 56 UG/ML (ref 50–100)
WBC # BLD: 6.8 K/UL (ref 4–11)
WBC UA: ABNORMAL /HPF (ref 0–5)

## 2019-05-12 PROCEDURE — 80164 ASSAY DIPROPYLACETIC ACD TOT: CPT

## 2019-05-12 PROCEDURE — G0378 HOSPITAL OBSERVATION PER HR: HCPCS

## 2019-05-12 PROCEDURE — 87077 CULTURE AEROBIC IDENTIFY: CPT

## 2019-05-12 PROCEDURE — 2580000003 HC RX 258: Performed by: EMERGENCY MEDICINE

## 2019-05-12 PROCEDURE — 87086 URINE CULTURE/COLONY COUNT: CPT

## 2019-05-12 PROCEDURE — 99291 CRITICAL CARE FIRST HOUR: CPT

## 2019-05-12 PROCEDURE — 73590 X-RAY EXAM OF LOWER LEG: CPT

## 2019-05-12 PROCEDURE — 6370000000 HC RX 637 (ALT 250 FOR IP): Performed by: INTERNAL MEDICINE

## 2019-05-12 PROCEDURE — 73560 X-RAY EXAM OF KNEE 1 OR 2: CPT

## 2019-05-12 PROCEDURE — 2580000003 HC RX 258: Performed by: INTERNAL MEDICINE

## 2019-05-12 PROCEDURE — 6360000002 HC RX W HCPCS: Performed by: EMERGENCY MEDICINE

## 2019-05-12 PROCEDURE — 80076 HEPATIC FUNCTION PANEL: CPT

## 2019-05-12 PROCEDURE — 87186 SC STD MICRODIL/AGAR DIL: CPT

## 2019-05-12 PROCEDURE — 93005 ELECTROCARDIOGRAM TRACING: CPT | Performed by: EMERGENCY MEDICINE

## 2019-05-12 PROCEDURE — 85610 PROTHROMBIN TIME: CPT

## 2019-05-12 PROCEDURE — 81001 URINALYSIS AUTO W/SCOPE: CPT

## 2019-05-12 PROCEDURE — 85025 COMPLETE CBC W/AUTO DIFF WBC: CPT

## 2019-05-12 PROCEDURE — 80048 BASIC METABOLIC PNL TOTAL CA: CPT

## 2019-05-12 PROCEDURE — 70450 CT HEAD/BRAIN W/O DYE: CPT

## 2019-05-12 PROCEDURE — 6370000000 HC RX 637 (ALT 250 FOR IP): Performed by: EMERGENCY MEDICINE

## 2019-05-12 PROCEDURE — 73552 X-RAY EXAM OF FEMUR 2/>: CPT

## 2019-05-12 RX ORDER — FERROUS SULFATE 325(65) MG
325 TABLET ORAL
Status: DISCONTINUED | OUTPATIENT
Start: 2019-05-13 | End: 2019-05-14

## 2019-05-12 RX ORDER — 0.9 % SODIUM CHLORIDE 0.9 %
1000 INTRAVENOUS SOLUTION INTRAVENOUS ONCE
Status: COMPLETED | OUTPATIENT
Start: 2019-05-12 | End: 2019-05-12

## 2019-05-12 RX ORDER — DEXTROSE MONOHYDRATE 25 G/50ML
12.5 INJECTION, SOLUTION INTRAVENOUS PRN
Status: DISCONTINUED | OUTPATIENT
Start: 2019-05-12 | End: 2019-05-13 | Stop reason: SDUPTHER

## 2019-05-12 RX ORDER — DIPHENHYDRAMINE HCL 25 MG
25 CAPSULE ORAL EVERY 6 HOURS
Status: ON HOLD | COMMUNITY
End: 2019-11-13 | Stop reason: HOSPADM

## 2019-05-12 RX ORDER — SODIUM CHLORIDE 0.9 % (FLUSH) 0.9 %
10 SYRINGE (ML) INJECTION PRN
Status: DISCONTINUED | OUTPATIENT
Start: 2019-05-12 | End: 2019-05-16 | Stop reason: HOSPADM

## 2019-05-12 RX ORDER — DEXTROSE MONOHYDRATE 50 MG/ML
100 INJECTION, SOLUTION INTRAVENOUS PRN
Status: DISCONTINUED | OUTPATIENT
Start: 2019-05-12 | End: 2019-05-13 | Stop reason: SDUPTHER

## 2019-05-12 RX ORDER — LISINOPRIL 5 MG/1
2.5 TABLET ORAL DAILY
Status: DISCONTINUED | OUTPATIENT
Start: 2019-05-13 | End: 2019-05-16 | Stop reason: HOSPADM

## 2019-05-12 RX ORDER — SPIRONOLACTONE 25 MG/1
25 TABLET ORAL DAILY
Status: DISCONTINUED | OUTPATIENT
Start: 2019-05-13 | End: 2019-05-16 | Stop reason: HOSPADM

## 2019-05-12 RX ORDER — ACETAMINOPHEN 325 MG/1
650 TABLET ORAL ONCE
Status: DISCONTINUED | OUTPATIENT
Start: 2019-05-12 | End: 2019-05-12

## 2019-05-12 RX ORDER — GLIMEPIRIDE 2 MG/1
1 TABLET ORAL
Status: DISCONTINUED | OUTPATIENT
Start: 2019-05-13 | End: 2019-05-12

## 2019-05-12 RX ORDER — ACETAMINOPHEN 160 MG/5ML
650 SOLUTION ORAL ONCE
Status: COMPLETED | OUTPATIENT
Start: 2019-05-12 | End: 2019-05-12

## 2019-05-12 RX ORDER — BUDESONIDE 0.5 MG/2ML
0.5 INHALANT ORAL 2 TIMES DAILY
Status: DISCONTINUED | OUTPATIENT
Start: 2019-05-12 | End: 2019-05-16 | Stop reason: HOSPADM

## 2019-05-12 RX ORDER — ATORVASTATIN CALCIUM 10 MG/1
10 TABLET, FILM COATED ORAL EVERY EVENING
Status: DISCONTINUED | OUTPATIENT
Start: 2019-05-12 | End: 2019-05-16 | Stop reason: HOSPADM

## 2019-05-12 RX ORDER — LEVOTHYROXINE SODIUM 0.03 MG/1
25 TABLET ORAL DAILY
Status: DISCONTINUED | OUTPATIENT
Start: 2019-05-12 | End: 2019-05-12 | Stop reason: SDUPTHER

## 2019-05-12 RX ORDER — GLIMEPIRIDE 2 MG/1
2 TABLET ORAL
Status: ON HOLD | COMMUNITY
End: 2019-05-14 | Stop reason: HOSPADM

## 2019-05-12 RX ORDER — CHOLESTYRAMINE LIGHT 4 G/5.7G
4 POWDER, FOR SUSPENSION ORAL DAILY
Status: ON HOLD | COMMUNITY
End: 2019-09-18

## 2019-05-12 RX ORDER — ARIPIPRAZOLE 15 MG/1
15 TABLET ORAL NIGHTLY
COMMUNITY

## 2019-05-12 RX ORDER — PANTOPRAZOLE SODIUM 40 MG/1
40 TABLET, DELAYED RELEASE ORAL
Status: DISCONTINUED | OUTPATIENT
Start: 2019-05-13 | End: 2019-05-16 | Stop reason: HOSPADM

## 2019-05-12 RX ORDER — LEVOTHYROXINE SODIUM 0.03 MG/1
25 TABLET ORAL DAILY
Status: ON HOLD | COMMUNITY
End: 2019-11-13 | Stop reason: HOSPADM

## 2019-05-12 RX ORDER — LISINOPRIL 5 MG/1
10 TABLET ORAL DAILY
Status: DISCONTINUED | OUTPATIENT
Start: 2019-05-13 | End: 2019-05-12 | Stop reason: SDUPTHER

## 2019-05-12 RX ORDER — ARIPIPRAZOLE 5 MG/1
15 TABLET ORAL NIGHTLY
Status: DISCONTINUED | OUTPATIENT
Start: 2019-05-12 | End: 2019-05-16 | Stop reason: HOSPADM

## 2019-05-12 RX ORDER — FLUTICASONE FUROATE AND VILANTEROL 200; 25 UG/1; UG/1
1 POWDER RESPIRATORY (INHALATION) DAILY
Status: DISCONTINUED | OUTPATIENT
Start: 2019-05-12 | End: 2019-05-12 | Stop reason: CLARIF

## 2019-05-12 RX ORDER — PHYTONADIONE 5 MG/1
5 TABLET ORAL ONCE
Status: COMPLETED | OUTPATIENT
Start: 2019-05-12 | End: 2019-05-12

## 2019-05-12 RX ORDER — LANOLIN ALCOHOL/MO/W.PET/CERES
1000 CREAM (GRAM) TOPICAL DAILY
Status: DISCONTINUED | OUTPATIENT
Start: 2019-05-12 | End: 2019-05-16 | Stop reason: HOSPADM

## 2019-05-12 RX ORDER — CLOZAPINE 100 MG/1
100 TABLET ORAL NIGHTLY
Status: DISCONTINUED | OUTPATIENT
Start: 2019-05-12 | End: 2019-05-16 | Stop reason: HOSPADM

## 2019-05-12 RX ORDER — FORMOTEROL FUMARATE 20 UG/2ML
20 SOLUTION RESPIRATORY (INHALATION) 2 TIMES DAILY
Status: DISCONTINUED | OUTPATIENT
Start: 2019-05-12 | End: 2019-05-16 | Stop reason: HOSPADM

## 2019-05-12 RX ORDER — NICOTINE POLACRILEX 4 MG
15 LOZENGE BUCCAL PRN
Status: DISCONTINUED | OUTPATIENT
Start: 2019-05-12 | End: 2019-05-13 | Stop reason: SDUPTHER

## 2019-05-12 RX ORDER — SODIUM CHLORIDE 0.9 % (FLUSH) 0.9 %
10 SYRINGE (ML) INJECTION EVERY 12 HOURS SCHEDULED
Status: DISCONTINUED | OUTPATIENT
Start: 2019-05-12 | End: 2019-05-16 | Stop reason: HOSPADM

## 2019-05-12 RX ORDER — LEVOTHYROXINE SODIUM 0.03 MG/1
25 TABLET ORAL DAILY
Status: DISCONTINUED | OUTPATIENT
Start: 2019-05-13 | End: 2019-05-16 | Stop reason: HOSPADM

## 2019-05-12 RX ORDER — OMEGA-3S/DHA/EPA/FISH OIL/D3 300MG-1000
2000 CAPSULE ORAL NIGHTLY
Status: DISCONTINUED | OUTPATIENT
Start: 2019-05-12 | End: 2019-05-16 | Stop reason: HOSPADM

## 2019-05-12 RX ORDER — TRAZODONE HYDROCHLORIDE 50 MG/1
50 TABLET ORAL NIGHTLY
Status: DISCONTINUED | OUTPATIENT
Start: 2019-05-12 | End: 2019-05-12

## 2019-05-12 RX ORDER — FUROSEMIDE 20 MG/1
20 TABLET ORAL 2 TIMES DAILY
Status: DISCONTINUED | OUTPATIENT
Start: 2019-05-12 | End: 2019-05-16 | Stop reason: HOSPADM

## 2019-05-12 RX ORDER — ONDANSETRON 2 MG/ML
4 INJECTION INTRAMUSCULAR; INTRAVENOUS EVERY 6 HOURS PRN
Status: DISCONTINUED | OUTPATIENT
Start: 2019-05-12 | End: 2019-05-16 | Stop reason: HOSPADM

## 2019-05-12 RX ORDER — CLOZAPINE 100 MG/1
200 TABLET ORAL 2 TIMES DAILY
Status: DISCONTINUED | OUTPATIENT
Start: 2019-05-12 | End: 2019-05-12 | Stop reason: SDUPTHER

## 2019-05-12 RX ORDER — NALOXONE HYDROCHLORIDE 0.4 MG/ML
0.4 INJECTION, SOLUTION INTRAMUSCULAR; INTRAVENOUS; SUBCUTANEOUS ONCE
Status: COMPLETED | OUTPATIENT
Start: 2019-05-12 | End: 2019-05-12

## 2019-05-12 RX ADMIN — CLOZAPINE 100 MG: 100 TABLET ORAL at 20:53

## 2019-05-12 RX ADMIN — SODIUM CHLORIDE 1000 ML: 0.9 INJECTION, SOLUTION INTRAVENOUS at 14:40

## 2019-05-12 RX ADMIN — SODIUM CHLORIDE 1000 ML: 9 INJECTION, SOLUTION INTRAVENOUS at 12:37

## 2019-05-12 RX ADMIN — VALPROIC ACID 500 MG: 250 SOLUTION ORAL at 20:53

## 2019-05-12 RX ADMIN — ACETAMINOPHEN 650 MG: 160 SOLUTION ORAL at 12:36

## 2019-05-12 RX ADMIN — Medication 10 ML: at 20:57

## 2019-05-12 RX ADMIN — NALOXONE HYDROCHLORIDE 0.4 MG: 0.4 INJECTION, SOLUTION INTRAMUSCULAR; INTRAVENOUS; SUBCUTANEOUS at 16:25

## 2019-05-12 RX ADMIN — ATORVASTATIN CALCIUM 10 MG: 10 TABLET, FILM COATED ORAL at 20:52

## 2019-05-12 RX ADMIN — CYANOCOBALAMIN TAB 1000 MCG 1000 MCG: 1000 TAB at 20:52

## 2019-05-12 RX ADMIN — ARIPIPRAZOLE 15 MG: 5 TABLET ORAL at 20:52

## 2019-05-12 RX ADMIN — PHYTONADIONE 5 MG: 5 TABLET ORAL at 14:02

## 2019-05-12 RX ADMIN — FUROSEMIDE 20 MG: 20 TABLET ORAL at 20:52

## 2019-05-12 RX ADMIN — CEFTRIAXONE 1 G: 1 INJECTION, POWDER, FOR SOLUTION INTRAMUSCULAR; INTRAVENOUS at 14:24

## 2019-05-12 RX ADMIN — CHOLECALCIFEROL (VITAMIN D3) 10 MCG (400 UNIT) TABLET 2000 UNITS: at 20:52

## 2019-05-12 ASSESSMENT — PAIN DESCRIPTION - ORIENTATION
ORIENTATION: LEFT
ORIENTATION: RIGHT;LEFT

## 2019-05-12 ASSESSMENT — PAIN SCALES - GENERAL
PAINLEVEL_OUTOF10: 10

## 2019-05-12 ASSESSMENT — PAIN DESCRIPTION - PAIN TYPE: TYPE: ACUTE PAIN

## 2019-05-12 ASSESSMENT — PAIN DESCRIPTION - ONSET: ONSET: ON-GOING

## 2019-05-12 ASSESSMENT — PAIN DESCRIPTION - LOCATION
LOCATION: HEAD;LEG
LOCATION: LEG

## 2019-05-12 ASSESSMENT — PAIN DESCRIPTION - FREQUENCY: FREQUENCY: CONTINUOUS

## 2019-05-12 ASSESSMENT — PAIN DESCRIPTION - DESCRIPTORS
DESCRIPTORS: SORE;ACHING
DESCRIPTORS: ACHING

## 2019-05-12 ASSESSMENT — PAIN - FUNCTIONAL ASSESSMENT: PAIN_FUNCTIONAL_ASSESSMENT: 0-10

## 2019-05-12 NOTE — PROGRESS NOTES
Pt eating dinner at this time and watching TV, pure wick placed on the pt to help decrease incontinent episodes. The pt states she normally pees in her briefs.   Raffi Benavides
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
## 2019-05-12 NOTE — ED PROVIDER NOTES
ED Attending Attestation Note     Date of evaluation: 5/12/2019    Chief Complaint     Fall and Knee Pain        Medical Decision Making      This patient was seen by the resident. I have seen and examined the patient, agree with the workup, evaluation, management and diagnosis. The care plan has been discussed. My assessment reveals 37-year-old female history of chronic venous insufficiency presents after a reported mechanical fall after tripping at her nursing care facility. Patient reports pain to her whole lower extremities. Patient is afebrile hemodynamically stable with appropriate mentation at baseline. No evidence of new wounds, crepitus, erythema to suggest acute infection. Portion is due per chronically ill second and interventions insufficiency however patient reports noncompliance with her vascular surgeon recommendations. Patient is on anticoagulation however does not appear to have any evidence of pain or reported tenderness to her abdomen chest or head. We'll evaluate laboratory studies to ensure no evidence of anemia or metabolic derangement contributed to patient's fall though I believe this is lower likelihood. Unlikely infectious etiology controlling the patient's falls. Patient's fall is likely triple to chronic lower extremity venous insufficiency with slight deconditioning. Reevaluation 1326  Laboratory studies suggestive of a supratherapeutic INR at 12.5 with new thrombocytopenia  No active bleeding identified now including CT head imaging however given patient's risk factors multiple comorbidities and low blood pressure we'll admit for further observation  We'll discharge with oral vitamin K therapy and hold Coumadin      Critical Care:  Due to the immediate potential for life-threatening deterioration due to supratherapeutic INR with hypotension, I spent 30 minutes providing critical care.   This time excludes time spent performing procedures but includes time spent on direct patient care, history retrieval, review of the chart, and discussions with patient, family, and consultant(s). Diagnostic Results     EKG   Performed 1132 interpreted by ER physician  Normal sinus rhythm rate 73   QTc 495  Inferior Q waves similar in appearance to EKG dated 9/22/18  No ST changes    RADIOLOGY:  XR FEMUR LEFT (MIN 2 VIEWS)    (Results Pending)   XR FEMUR RIGHT (MIN 2 VIEWS)    (Results Pending)   XR TIBIA FIBULA LEFT (2 VIEWS)    (Results Pending)   XR TIBIA FIBULA RIGHT (2 VIEWS)    (Results Pending)   CT Head WO Contrast    (Results Pending)   XR KNEE LEFT (1-2 VIEWS)    (Results Pending)   XR KNEE RIGHT (1-2 VIEWS)    (Results Pending)       LABS:   Labs Reviewed   CBC WITH AUTO DIFFERENTIAL - Abnormal; Notable for the following components:       Result Value    Hemoglobin 11.3 (*)     Hematocrit 35.0 (*)     RDW 19.2 (*)     Platelets 940 (*)     All other components within normal limits    Narrative:     Performed at: [facility] - [place] [address] [address] [address]   Phone [phone]   BASIC METABOLIC PANEL W/ REFLEX TO MG FOR LOW K - Abnormal; Notable for the following components:    Chloride 95 (*)     Glucose 216 (*)     BUN 23 (*)     All other components within normal limits    Narrative:     Performed at: [facility] - [facility]  [address] [address] [address]   Phone [phone]   PROTIME-INR - Abnormal; Notable for the following components:    Protime 142.6 (*)     INR 12.51 (*)     All other components within normal limits    Narrative:     Daniel Kenney tel. [phone],  Coag results called to and read back by Kendrick Erwin rn, 05/12/2019 12:30,  by Robert Perez  Performed at: [facility] - [place] [address] [address] [address]   Phone [phone]   VALPROIC ACID LEVEL, TOTAL    Narrative:     Performed at:   [facility] -
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
## 2019-05-12 NOTE — ED PROVIDER NOTES
[facility] RESIDENT NOTE       Date of evaluation: 5/12/2019    Chief Complaint     Fall and Knee Pain      History of Present Illness     Adan Pittman is a 66 y.o. female schizophrenia coming from a group home, CKD, COPD, diabetes, chronic venous insufficiency and lower extremities edema who presents with bilateral leg pain. She fell today while using her walker secondary to leg pain. Has a long history of venous insufficiency and difficulty following up. She was actually sent to the vascular surgeon's office from her ED visit last time on May 2 via cab as she had not followed up. She was not wearing her compression stockings. She has her legs wrapped once weekly at the group home. She denies any chest pain or shortness of breath or headache or numbness preceding or after her fall today. She did not strike her head or lose consciousness per report. Review of Systems     Review of Systems   All other systems reviewed and are negative. Past Medical, Surgical, Family, and Social History     She has a past medical history of Chronic mental illness, Chronic pain, CKD (chronic kidney disease) stage 1, GFR 90 ml/min or greater, COPD (chronic obstructive pulmonary disease) ([address] Depression, Diabetes mellitus, type 2 ([address] Diabetic neuropathy ([address] GERD (gastroesophageal reflux disease), History of DVT (deep vein thrombosis), History of primary hyperparathyroidism, Hypertension, Lymphedema of leg, Nasal bone fracture, Normal stress echocardiogram, OA (osteoarthritis), and Schizophrenia ([address] She has a past surgical history that includes Tubal ligation; Colonoscopy (3/14/2012); and joint replacement (Left). Her Family history is unknown by patient. She reports that she has been smoking cigarettes. She has been smoking about 0.50 packs per day.  She has never used smokeless tobacco. She reports that she does not drink alcohol or use drugs.    Medications     Previous Medications    ARIPIPRAZOLE (ABILIFY) 5 MG TABLET    Take 5 mg by mouth nightly. ATORVASTATIN (LIPITOR) 10 MG TABLET    Take 10 mg by mouth daily    CLOZAPINE (CLOZARIL) 100 MG TABLET    Take 200 mg by mouth 2 times daily. FERROUS SULFATE 325 (65 FE) MG TABLET    Take 325 mg by mouth daily (with breakfast)    FLUTICASONE FUROATE-VILANTEROL 200-25 MCG/INH AEPB    Inhale 1 puff into the lungs daily    FUROSEMIDE (LASIX) 20 MG TABLET    Take 20 mg by mouth 2 times daily    GLIMEPIRIDE (AMARYL) 1 MG TABLET    Take 1 mg by mouth every morning (before breakfast)    LEVOTHYROXINE (SYNTHROID) 50 MCG TABLET    Take 50 mcg by mouth Daily    LISINOPRIL (PRINIVIL;ZESTRIL) 2.5 MG TABLET    Take 2.5 mg by mouth daily    NICOTINE (NICODERM CQ) 14 MG/24HR    Place 1 patch onto the skin every 24 hours After two weeks step down to 7 mg nicotine patch. Do not smoke when using nicotine patch    NICOTINE (NICODERM CQ) 7 MG/24HR    Place 1 patch onto the skin every 24 hours X 2 weeks then stop. Do not smoke while using nicotine patch    OMEPRAZOLE (PRILOSEC) 20 MG DELAYED RELEASE CAPSULE    Take 20 mg by mouth daily    ONDANSETRON (ZOFRAN) 4 MG TABLET    Take 1 tablet by mouth every 8 hours as needed for Nausea    SPIRONOLACTONE (ALDACTONE) 25 MG TABLET    Take 1 tablet by mouth daily    THERAPEUTIC MULTIVITAMIN-MINERALS (THERAGRAN-M) TABLET    Take 1 tablet by mouth daily. TIOTROPIUM (SPIRIVA RESPIMAT) 2.5 MCG/ACT AERS INHALER    Inhale 2 puffs into the lungs daily    TRAZODONE (DESYREL) 50 MG TABLET    Take 50 mg by mouth nightly    VALPROIC ACID (DEPAKENE) 250 MG/5ML SYRP    Take 500 mg by mouth nightly. VITAMIN B-12 (CYANOCOBALAMIN) 1000 MCG TABLET    Take 1,000 mcg by mouth daily    VITAMIN D (CHOLECALCIFEROL) 1000 UNITS CAPS CAPSULE    Take 2,000 Units by mouth nightly.     WARFARIN (COUMADIN) 2.5 MG TABLET    Take 2.5 mg by mouth       Allergies     She has No Known Allergies. Physical Exam     INITIAL VITALS: BP: (!) 96/52, Temp: 97.7 °F (36.5 °C), Pulse: 74, Resp: 20, SpO2: 100 %   Physical Exam   Constitutional: She appears well-developed. No distress. HENT:   Head: Normocephalic and atraumatic. Eyes: Pupils are equal, round, and reactive to light. EOM are normal.   Neck: Normal range of motion. Cardiovascular: Normal rate, regular rhythm, normal heart sounds and intact distal pulses. Pulmonary/Chest: Effort normal and breath sounds normal. No respiratory distress. She has no wheezes. Abdominal: Soft. She exhibits no distension. There is no tenderness. Musculoskeletal:   There is diffuse venous stasis changes of the bilateral distal lower extremities to the knee with scaling and scattered erythema and serous drainage diffusely; she reports tenderness diffusely in the thighs and lower legs   Neurological: She is alert. Oriented to person and place, cranial nerves intact, 4/5 strength in bilateral lower extremities, sensation intact diffusely   Skin: Skin is warm and dry. Capillary refill takes 2 to 3 seconds. Psychiatric:   Flat affect       DiagnosticResults       RADIOLOGY:  CT Head WO Contrast   Final Result      No acute intracranial hemorrhage or mass effect. Small scalp hematoma is noted. XR TIBIA FIBULA LEFT (2 VIEWS)   Final Result      No acute abnormality in the left femur, left knee, left tibia/fibula, right femur, right knee, or right tibia/fibula. XR TIBIA FIBULA RIGHT (2 VIEWS)   Final Result      No acute abnormality in the left femur, left knee, left tibia/fibula, right femur, right knee, or right tibia/fibula. XR KNEE RIGHT (1-2 VIEWS)   Final Result      No acute abnormality in the left femur, left knee, left tibia/fibula, right femur, right knee, or right tibia/fibula.       XR KNEE LEFT (1-2 VIEWS)   Final Result      No acute abnormality in the left femur, left knee, left tibia/fibula, right femur, right knee, or lower than her baseline which appears to be 110s to 120s. She is not tachycardic. Has no obvious signs of trauma. X-rays of the lower extremities are negative for any obvious fracture. CT head negative for hemorrhage. INR is 12.5. Her abdomen is soft and she does not have any evidence of bleeding at this time. Her hemoglobin is stable. She was given oral vitamin K.  Her urine was nitrite positive with some pyuria. A culture has been sent and we will give her a liter of IV fluids and Rocephin. Blood pressure currently 044 systolic. We'll admit to hospitalist for correction of INR and antibiotics. This patient was also evaluated by the attending physician. All care plans werediscussed and agreed upon. Clinical Impression     1. INR (international normal ratio) abnormal    2. Fall, initial encounter    3. Thrombocytopenia ([address]        Disposition     PATIENT REFERRED TO:  No follow-up provider specified.     DISCHARGE MEDICATIONS:  New Prescriptions    No medications on file       DISPOSITION Decision To Admit 05/12/2019 02:55:44 PM      Gume Garcia MD  Resident  05/12/19 6390

## 2019-05-12 NOTE — ED NOTES
Report called to [facility], for receiving RN for 7098. Patient presently awake and alert, eating a sandwich.   PCT [facility] will transport when patient is finished eating     Shady Pack RN  05/12/19 2188

## 2019-05-12 NOTE — PROGRESS NOTES
4 Eyes Admission Assessment     I agree as the admission nurse that 2 RN's have performed a thorough Head to Toe Skin Assessment on the patient. ALL assessment sites listed below have been assessed on admission. Areas assessed by both nurses:   [x]   Head, Face, and Ears   [x]   Shoulders, Back, and Chest  [x]   Arms, Elbows, and Hands   [x]   Coccyx, Sacrum, and Ischum  [x]   Legs, Feet, and Heels        Does the Patient have Skin Breakdown? Coccyx area. kike area red and excoriated, but blanchable. Pt's bilateral lower extremities are dry and very flaky, parts of flaky skin peeling off, but no open areas, pt has chronic venus insufficiency. Pt also has a very large bruise on the left side of her bottom.      Arthur Prevention initiated: yes  Wound Care Orders initiated: no      [facility] nurse consulted for Pressure Injury (Stage 3,4, Unstageable, DTI, NWPT, and Complex wounds): no    Nurse 1 eSignature: Electronically signed by Dwight Tomas RN on 5/12/19 at 6:29 PM    **SHARE this note so that the co-signing nurse is able to place an eSignature**    Nurse 2 eSignature: Electronically signed by Flip Gamboa RN on 5/12/19 at 6:31 PM

## 2019-05-12 NOTE — ED NOTES
Pt sleeping,  Difficult to awaken. eventuallt wooke to her name and answers questions. Returns to sleep. Dr. Kaylynn Holland aware.        Kirill Reyes, RN  05/12/19 0548

## 2019-05-13 PROBLEM — E08.65 DIABETES MELLITUS DUE TO UNDERLYING CONDITION WITH HYPERGLYCEMIA (HCC): Status: ACTIVE | Noted: 2019-05-13

## 2019-05-13 PROBLEM — R29.6 FREQUENT FALLS: Status: ACTIVE | Noted: 2019-05-13

## 2019-05-13 LAB
EKG ATRIAL RATE: 73 BPM
EKG DIAGNOSIS: NORMAL
EKG P AXIS: 56 DEGREES
EKG P-R INTERVAL: 154 MS
EKG Q-T INTERVAL: 450 MS
EKG QRS DURATION: 100 MS
EKG QTC CALCULATION (BAZETT): 495 MS
EKG R AXIS: -56 DEGREES
EKG T AXIS: 77 DEGREES
EKG VENTRICULAR RATE: 73 BPM
GLUCOSE BLD-MCNC: 103 MG/DL (ref 70–99)
GLUCOSE BLD-MCNC: 158 MG/DL (ref 70–99)
GLUCOSE BLD-MCNC: 190 MG/DL (ref 70–99)
GLUCOSE BLD-MCNC: 299 MG/DL (ref 70–99)
INR BLD: 12.13 (ref 0.86–1.14)
PERFORMED ON: ABNORMAL
PROTHROMBIN TIME: 138.3 SEC (ref 9.8–13)

## 2019-05-13 PROCEDURE — 36415 COLL VENOUS BLD VENIPUNCTURE: CPT

## 2019-05-13 PROCEDURE — 85610 PROTHROMBIN TIME: CPT

## 2019-05-13 PROCEDURE — 94640 AIRWAY INHALATION TREATMENT: CPT

## 2019-05-13 PROCEDURE — 83036 HEMOGLOBIN GLYCOSYLATED A1C: CPT

## 2019-05-13 PROCEDURE — 92523 SPEECH SOUND LANG COMPREHEN: CPT

## 2019-05-13 PROCEDURE — 6360000002 HC RX W HCPCS: Performed by: INTERNAL MEDICINE

## 2019-05-13 PROCEDURE — 2580000003 HC RX 258: Performed by: INTERNAL MEDICINE

## 2019-05-13 PROCEDURE — 94761 N-INVAS EAR/PLS OXIMETRY MLT: CPT

## 2019-05-13 PROCEDURE — 92610 EVALUATE SWALLOWING FUNCTION: CPT

## 2019-05-13 PROCEDURE — 1200000000 HC SEMI PRIVATE

## 2019-05-13 PROCEDURE — 6370000000 HC RX 637 (ALT 250 FOR IP): Performed by: INTERNAL MEDICINE

## 2019-05-13 PROCEDURE — 94664 DEMO&/EVAL PT USE INHALER: CPT

## 2019-05-13 RX ORDER — DEXTROSE MONOHYDRATE 25 G/50ML
12.5 INJECTION, SOLUTION INTRAVENOUS PRN
Status: DISCONTINUED | OUTPATIENT
Start: 2019-05-13 | End: 2019-05-16 | Stop reason: HOSPADM

## 2019-05-13 RX ORDER — ACETAMINOPHEN 325 MG/1
650 TABLET ORAL EVERY 4 HOURS PRN
Status: DISCONTINUED | OUTPATIENT
Start: 2019-05-13 | End: 2019-05-16 | Stop reason: HOSPADM

## 2019-05-13 RX ORDER — TRAMADOL HYDROCHLORIDE 50 MG/1
50 TABLET ORAL EVERY 6 HOURS PRN
Status: DISCONTINUED | OUTPATIENT
Start: 2019-05-13 | End: 2019-05-16 | Stop reason: HOSPADM

## 2019-05-13 RX ORDER — DEXTROSE MONOHYDRATE 50 MG/ML
100 INJECTION, SOLUTION INTRAVENOUS PRN
Status: DISCONTINUED | OUTPATIENT
Start: 2019-05-13 | End: 2019-05-16 | Stop reason: HOSPADM

## 2019-05-13 RX ORDER — TRAMADOL HYDROCHLORIDE 50 MG/1
100 TABLET ORAL EVERY 6 HOURS PRN
Status: DISCONTINUED | OUTPATIENT
Start: 2019-05-13 | End: 2019-05-16 | Stop reason: HOSPADM

## 2019-05-13 RX ORDER — NICOTINE POLACRILEX 4 MG
15 LOZENGE BUCCAL PRN
Status: DISCONTINUED | OUTPATIENT
Start: 2019-05-13 | End: 2019-05-16 | Stop reason: HOSPADM

## 2019-05-13 RX ADMIN — FUROSEMIDE 20 MG: 20 TABLET ORAL at 08:42

## 2019-05-13 RX ADMIN — VALPROIC ACID 500 MG: 250 SOLUTION ORAL at 10:26

## 2019-05-13 RX ADMIN — CHOLECALCIFEROL (VITAMIN D3) 10 MCG (400 UNIT) TABLET 2000 UNITS: at 20:29

## 2019-05-13 RX ADMIN — FORMOTEROL FUMARATE DIHYDRATE 20 MCG: 20 SOLUTION RESPIRATORY (INHALATION) at 20:12

## 2019-05-13 RX ADMIN — INSULIN LISPRO 6 UNITS: 100 INJECTION, SOLUTION INTRAVENOUS; SUBCUTANEOUS at 12:03

## 2019-05-13 RX ADMIN — BUDESONIDE 500 MCG: 0.5 SUSPENSION RESPIRATORY (INHALATION) at 20:12

## 2019-05-13 RX ADMIN — PANTOPRAZOLE SODIUM 40 MG: 40 TABLET, DELAYED RELEASE ORAL at 06:12

## 2019-05-13 RX ADMIN — TIOTROPIUM BROMIDE 18 MCG: 18 CAPSULE ORAL; RESPIRATORY (INHALATION) at 08:04

## 2019-05-13 RX ADMIN — SPIRONOLACTONE 25 MG: 25 TABLET ORAL at 08:42

## 2019-05-13 RX ADMIN — CEFTRIAXONE 1 G: 1 INJECTION, POWDER, FOR SOLUTION INTRAMUSCULAR; INTRAVENOUS at 08:43

## 2019-05-13 RX ADMIN — LEVOTHYROXINE SODIUM 25 MCG: 25 TABLET ORAL at 06:12

## 2019-05-13 RX ADMIN — BUDESONIDE 500 MCG: 0.5 SUSPENSION RESPIRATORY (INHALATION) at 07:56

## 2019-05-13 RX ADMIN — FORMOTEROL FUMARATE DIHYDRATE 20 MCG: 20 SOLUTION RESPIRATORY (INHALATION) at 07:56

## 2019-05-13 RX ADMIN — Medication 10 ML: at 09:50

## 2019-05-13 RX ADMIN — CLOZAPINE 100 MG: 100 TABLET ORAL at 20:30

## 2019-05-13 RX ADMIN — INSULIN LISPRO 2 UNITS: 100 INJECTION, SOLUTION INTRAVENOUS; SUBCUTANEOUS at 17:31

## 2019-05-13 RX ADMIN — PHYTONADIONE 5 MG: 10 INJECTION, EMULSION INTRAMUSCULAR; INTRAVENOUS; SUBCUTANEOUS at 13:18

## 2019-05-13 RX ADMIN — VALPROIC ACID 500 MG: 250 SOLUTION ORAL at 15:32

## 2019-05-13 RX ADMIN — LISINOPRIL 2.5 MG: 5 TABLET ORAL at 08:42

## 2019-05-13 RX ADMIN — FERROUS SULFATE TAB 325 MG (65 MG ELEMENTAL FE) 325 MG: 325 (65 FE) TAB at 08:42

## 2019-05-13 RX ADMIN — VALPROIC ACID 500 MG: 250 SOLUTION ORAL at 20:30

## 2019-05-13 RX ADMIN — FUROSEMIDE 20 MG: 20 TABLET ORAL at 20:29

## 2019-05-13 RX ADMIN — ATORVASTATIN CALCIUM 10 MG: 10 TABLET, FILM COATED ORAL at 18:42

## 2019-05-13 RX ADMIN — ARIPIPRAZOLE 15 MG: 5 TABLET ORAL at 20:29

## 2019-05-13 RX ADMIN — CYANOCOBALAMIN TAB 1000 MCG 1000 MCG: 1000 TAB at 08:42

## 2019-05-13 ASSESSMENT — PAIN DESCRIPTION - DESCRIPTORS
DESCRIPTORS: ACHING;SORE
DESCRIPTORS: ACHING;SORE

## 2019-05-13 ASSESSMENT — PAIN DESCRIPTION - ONSET
ONSET: ON-GOING
ONSET: ON-GOING

## 2019-05-13 ASSESSMENT — PAIN SCALES - GENERAL
PAINLEVEL_OUTOF10: 4
PAINLEVEL_OUTOF10: 2

## 2019-05-13 ASSESSMENT — PAIN DESCRIPTION - LOCATION
LOCATION: LEG
LOCATION: LEG

## 2019-05-13 ASSESSMENT — PAIN DESCRIPTION - FREQUENCY
FREQUENCY: CONTINUOUS
FREQUENCY: CONTINUOUS

## 2019-05-13 ASSESSMENT — PAIN - FUNCTIONAL ASSESSMENT
PAIN_FUNCTIONAL_ASSESSMENT: ACTIVITIES ARE NOT PREVENTED
PAIN_FUNCTIONAL_ASSESSMENT: ACTIVITIES ARE NOT PREVENTED

## 2019-05-13 ASSESSMENT — PAIN DESCRIPTION - ORIENTATION
ORIENTATION: LEFT
ORIENTATION: LEFT

## 2019-05-13 ASSESSMENT — PAIN DESCRIPTION - PROGRESSION
CLINICAL_PROGRESSION: NOT CHANGED
CLINICAL_PROGRESSION: NOT CHANGED

## 2019-05-13 ASSESSMENT — PAIN DESCRIPTION - PAIN TYPE
TYPE: ACUTE PAIN
TYPE: ACUTE PAIN

## 2019-05-13 NOTE — PLAN OF CARE
Problem: Falls - Risk of:  Goal: Will remain free from falls  Description  Will remain free from falls  5/13/2019 1704 by Lenora Barbosa RN  Outcome: Ongoing    Note:   Patient remains free from falls. In bed with alarm on and call light within reach. Patient making no attempts to get out of bed without help from staff. Will continue to monitor.

## 2019-05-13 NOTE — PROGRESS NOTES
Patient VSS with exception to BP being low at times. Patient lungs clear and denies any SOB. Patient complains of R leg pain d/t cellulitis. Patient lower legs +3 edema, dry and flaky. Purwick in place and voiding well. Patient tolerating diet and liquids. Fall precautions in place and call light within reach. Will continue to monitor.

## 2019-05-13 NOTE — PROGRESS NOTES
as possible for all oral intake    Treatment/Goals  Dysphagia Goals: The patient will tolerate recommended diet without observed clinical signs of aspiration    General  Chart Reviewed: Yes  Comments: Pt admitted 5/12 s/p mechanical fall at group home. Pt is admitted at this time for abnormal INR, found to have a UTI. Pertinent medical hx included GERD and COPD only. RN reports tolerating current diet at this time. MD notes indicate \"Pt requesting speech therapy as well\". Behavior/Cognition: Alert;Confused; Agitated; Impulsive  Temperature Spikes Noted: No  Respiratory Status: Room air  O2 Device: None (Room air)  Communication Observation: Aphasia  Follows Directions: Simple  Dentition: Edentulous(Reports dentures at baseline but none present at time of evaluation)  Patient Positioning: Upright in bed  Baseline Vocal Quality: Normal  Volitional Cough: Weak  Volitional Swallow: Delayed  Prior Dysphagia History: None per chart review. BSE completed in 2016 with regular diet / thin liquids and no follow-up recommended. Pt reports remote history of \"Mini stroke\" with dysphagia and trouble speaking. No record of this. No family present at time of assessment. Consistencies Administered: Soft solid; Thin - cup; Thin - straw; Ice Chips    Vision/Hearing  Vision  Vision: Within Functional Limits  Hearing  Hearing: Within functional limits    Oral Motor Deficits  Oral/Motor  Oral Motor: Within functional limits    Oral Phase Dysfunction  Oral Phase  Oral Phase: Exceptions  Oral Phase Dysfunction  Impaired Mastication: Soft Solid  Oral Phase  Oral Phase - Comment: Prolonged mastication secondary to being edentulous. Indicators of Pharyngeal Phase Dysfunction   Pharyngeal Phase  Pharyngeal Phase: Exceptions  Indicators of Pharyngeal Phase Dysfunction  Cough - Delayed:  Thin - straw  Pharyngeal Phase   Pharyngeal: x1 coughing with thins via straw     Prognosis  Individuals consulted  Consulted and agree with results and

## 2019-05-13 NOTE — PROGRESS NOTES
Speech Language Pathology  Facility/Department: [facility] 5T ORTHO/NEURO  Initial Speech/Language/Cognitive Assessment    NAME: Javan Leiva  : 1955   MRN: [id number]  ADMISSION DATE: 2019  ADMITTING DIAGNOSIS: has COPD (chronic obstructive pulmonary disease) ([facility] [address] Abdominal pain; Colitis; Elevated INR; Schizophrenia ([facility] [address] Acute metabolic encephalopathy; Fall; UTI (urinary tract infection); Frequent falls; and Diabetes mellitus due to underlying condition with hyperglycemia ([facility] [address] on their problem list.  DATE ONSET: 19    Date of Eval: 2019   Evaluating Therapist: ARMANDO Brar    RECENT RESULTS  CT OF HEAD: 19  Impression       No acute intracranial hemorrhage or mass effect.       Small scalp hematoma is noted.         Primary Complaint: Difficulty speaking    Pain:  Pain Assessment  Pain Assessment: 0-10  Pain Level: Initially reported no pain but later endorsed 10/10 right knee pain. RN notified    Assessment:  Cognitive Diagnosis: Moderate cognitive-linguistic impairment   Diagnosis: Pt presents with moderate cognitive-linguistic impairment which could be attributed to mental health disorder. The patient had concerns specifically for language impairments so focus of assessment was on language. Patient did demonstrate reduced verbal output intermittently (halting and reporting she couldn't complete utterances), reduced naming, poor command following (multi-step and complex). Given that patient endorses these are due to previous mini stroke (can not be verified by medical record) and that her langauge is no different today than it was 2 days ago, would recommend that her concerns be addressed as an outpatient as these are not acute. Educated possibility of speech therapy at discharge as she desires.      Recommendations:  Requires SLP Intervention: Yes  Duration/Frequency of Treatment: N/A   D/C Recommendations: Home ST     Plan:   Goals:  Short-term Goals  Timeframe for Short-term Goals: N/A     Subjective:  General  Chart Reviewed: Yes  Additional Pertinent Hx: Pertinent medical hx included GERD and COPD. Pt reports \"mini stroke\" recently with SNF / Speech Therapy but unable to find in medical record / via Care Everywhere  Family / Caregiver Present: No  General Comment  Comments: Pt admitted 5/12 s/p mechanical fall at group [place]. Pt is admitted at this time for abnormal INR, found to have a UTI. RN reports tolerating current diet at this time. MD notes indicate \"Pt requesting speech therapy as well\". Social/Functional History  Lives With: (14 other residents; NOT around the clock caregiver)  Type of Home: (Group home)  Active : No  Mode of Transportation: (\"Medicare is a company that picks you up if you have to go to the [employer]")  Education: 2 years of college  Occupation: (Never worked)  Type of occupation: Never worked   Leisure & Hobbies: [address]  Additional Comments: The above information was obtained from the pt; question accuracy of this information  Vision  Vision: Within Functional Limits  Hearing  Hearing: Within functional limits     Objective:  Oral/Motor  Oral Motor:  Within functional limits    Auditory Comprehension  Comprehension: Exceptions  Yes/No Questions: Mild(90% accuracy with yes/no questions)  Multistep Basic Commands: Mild  Complex/Abstract Commands: Mild  Conversation: Mild     Expression  Primary Mode of Expression: Verbal  Verbal Expression  Verbal Expression: Exceptions to functional limits  Repetition: Mild(with longer utterances)  Confrontation: (WFL)  Convergent: Mild(Some illogical answers )  Divergent: (Generated 12 items on basic divergent naming task)  Conversation: Mild  Written Expression  Written Expression: (Not assessed; suspect no demands for writing)    Motor Speech  Motor Speech: Exceptions to [facility]  Intelligibility: Mild(Pt does not have dentures in)    Pragmatics/Social Functioning  Pragmatics: Exceptions to WFL(Inappropriate behavior;

## 2019-05-13 NOTE — PROGRESS NOTES
Hospitalist Progress Note    PCP: GIDEON BETTS, APRN - CNP    Date of Admission: 5/12/2019  Hospital Day: 0      Chief Complaint:   Chief Complaint   Patient presents with    Fall    Knee Pain           Hospital Course:   Admitted for elevated INR and drowsiness       Subjective:   Patient seen and examined  Significantly more alert today  Reports pain and swelling in both legs  Sister at bedside, and provided rest of history    Ancillary notes reviewed    Medications:  Reviewed    Infusion Medications    dextrose       Scheduled Medications    insulin lispro  0-12 Units Subcutaneous TID WC    insulin lispro  0-6 Units Subcutaneous Nightly    phytonadione (VITAMIN K)  IVPB  5 mg Intravenous Once    ARIPiprazole  15 mg Oral Nightly    atorvastatin  10 mg Oral QPM    ferrous sulfate  325 mg Oral Daily with breakfast    furosemide  20 mg Oral BID    pantoprazole  40 mg Oral QAM AC    spironolactone  25 mg Oral Daily    tiotropium  18 mcg Inhalation Daily    valproate  500 mg Oral TID    vitamin B-12  1,000 mcg Oral Daily    vitamin D3  2,000 Units Oral Nightly    sodium chloride flush  10 mL Intravenous 2 times per day    cefTRIAXone (ROCEPHIN) IV  1 g Intravenous Q24H    warfarin (COUMADIN) daily dosing (placeholder)   Other See Admin Instructions    cloZAPine  100 mg Oral Nightly    lisinopril  2.5 mg Oral Daily    levothyroxine  25 mcg Oral Daily    budesonide  0.5 mg Nebulization BID    formoterol  20 mcg Nebulization BID     PRN Meds: glucose, dextrose, glucagon (rDNA), dextrose, sodium chloride flush, magnesium hydroxide, ondansetron      Intake/Output Summary (Last 24 hours) at 5/13/2019 1401  Last data filed at 5/13/2019 1356  Gross per 24 hour   Intake 1410 ml   Output 1500 ml   Net -90 ml       Exam:    /83   Pulse 80   Temp 98 °F (36.7 °C) (Oral)   Resp 16   Ht 5' 6\" (1.676 m)   Wt 208 lb (94.3 kg)   SpO2 98%   BMI 33.57 kg/m²       General appearance: No apparent distress, appears stated age and cooperative. HEENT: Pupils equal, round, and reactive to light. Conjunctivae/corneas clear. Neck: Supple, with full range of motion. No jugular venous distention. Trachea midline. Respiratory:  Normal respiratory effort. Clear to auscultation, bilaterally without Rales/Wheezes/Rhonchi. Cardiovascular: Regular rate and rhythm with normal S1/S2 without murmurs, rubs or gallops. Abdomen: Soft, non-tender, non-distended with normal bowel sounds. Musculoskeletal: 3+ pitting edema bilaterally with chronic stasis changes  Skin: Skin color, texture, turgor normal.  No rashes or lesions. Neurologic:  Neurovascularly intact without any focal sensory/motor deficits. Cranial nerves: II-XII intact, grossly non-focal.  Psychiatric: Alert and oriented, thought content appropriate, normal insight  Capillary Refill: Brisk,< 3 seconds   Peripheral Pulses: +2 palpable, equal bilaterally       Labs:   Recent Labs     05/12/19  1143   WBC 6.8   HGB 11.3*   HCT 35.0*   *     Recent Labs     05/12/19  1143      K 4.5   CL 95*   CO2 27   BUN 23*   CREATININE 0.8   CALCIUM 9.5     Recent Labs     05/12/19  1143   AST 25   ALT 55*   BILIDIR <0.2   BILITOT <0.2   ALKPHOS 168*     Recent Labs     05/12/19  1143 05/13/19  0527   INR 12.51* 12.13*     No results for input(s): Tresa Dark in the last 72 hours. Urinalysis:      Lab Results   Component Value Date    NITRU POSITIVE 05/12/2019    WBCUA 6-10 05/12/2019    BACTERIA 4+ 05/12/2019    RBCUA 0-2 05/12/2019    BLOODU Negative 05/12/2019    SPECGRAV 1.020 05/12/2019    GLUCOSEU Negative 05/12/2019       Radiology:  CT Head WO Contrast   Final Result      No acute intracranial hemorrhage or mass effect. Small scalp hematoma is noted. XR TIBIA FIBULA LEFT (2 VIEWS)   Final Result      No acute abnormality in the left femur, left knee, left tibia/fibula, right femur, right knee, or right tibia/fibula.       XR TIBIA FIBULA RIGHT (2 VIEWS)   Final Result      No acute abnormality in the left femur, left knee, left tibia/fibula, right femur, right knee, or right tibia/fibula. XR KNEE RIGHT (1-2 VIEWS)   Final Result      No acute abnormality in the left femur, left knee, left tibia/fibula, right femur, right knee, or right tibia/fibula. XR KNEE LEFT (1-2 VIEWS)   Final Result      No acute abnormality in the left femur, left knee, left tibia/fibula, right femur, right knee, or right tibia/fibula. XR FEMUR RIGHT (MIN 2 VIEWS)   Final Result      No acute abnormality in the left femur, left knee, left tibia/fibula, right femur, right knee, or right tibia/fibula. XR FEMUR LEFT (MIN 2 VIEWS)   Final Result      No acute abnormality in the left femur, left knee, left tibia/fibula, right femur, right knee, or right tibia/fibula. Assessment/Plan:    Active Hospital Problems    Diagnosis    Frequent falls [R29.6]    Diabetes mellitus due to underlying condition with hyperglycemia (HCC) [E08.65]    Elevated INR [R79.1]    Schizophrenia (HCC) [F20.9]    Acute metabolic encephalopathy [K05.44]    Fall [W19. XXXA]    UTI (urinary tract infection) [N39.0]       Acute metabolic encephalopathy seems to have resolved  Unclear cause of elevated INR, persistently elevated. Will give more vitamin K today, recheck INR daily. Pharmacy following  Continue empiric rocephin. Culture pending  Will require PT, OT. Pt requesting speech therapy as well  Likely DC once INR starts improving, will need follow up INR at her facility  Due to patient having frequent falls reportedly, I discussed with the patient and her sister about considering stopping anticoagulation. I recommend follow up with her primary cardiologist in the next 7-10 days after discharge to discuss risk vs benefit of coumadin.    Add SSI, hypoglycemia protocol      DVT Prophylaxis: lovenox  Diet: DIET GENERAL;  Code Status: Full Code    PT/OT Eval Status: ordered    Dispo - inpatient    Светлана Becerra MD  2:01 PM 5/13/2019

## 2019-05-13 NOTE — PROGRESS NOTES
RESPIRATORY THERAPY ASSESSMENT    Name:  Demarcus Mccloud Record Number:  [id number]  Age: 66 y.o. Gender: female  : 1955  Today's Date:  2019  Room:  5517/5517-01    Assessment     Is the patient being admitted for a COPD or Asthma exacerbation? No   (If yes the patient will be seen every 4 hours for the first 24 hours and then reassessed)    Patient Admission Diagnosis      Allergies  No Known Allergies    Minimum Predicted Vital Capacity:     884          Actual Vital Capacity:    Pt sleeping        Pulmonary History:COPD  Home Oxygen Therapy:  room air  Home Respiratory Therapy:Tiotropium Bromide and Vilanterol/Fluticasone Furoate   Current Respiratory Therapy:  Spiriva, pulmicort , performist          Respiratory Severity Index(RSI)   Patients with orders for inhalation medications, oxygen, or any therapeutic treatment modality will be placed on Respiratory Protocol. They will be assessed with the first treatment and at least every 72 hours thereafter. The following severity scale will be used to determine frequency of treatment intervention.     Smoking History: Pulmonary Disease or Smoking History, Greater than 15 pack year = 2    Social History  Social History     Tobacco Use    Smoking status: Current Every Day Smoker     Packs/day: 0.50     Types: Cigarettes    Smokeless tobacco: Never Used   Substance Use Topics    Alcohol use: No    Drug use: No       Recent Surgical History: None = 0  Past Surgical History  Past Surgical History:   Procedure Laterality Date    COLONOSCOPY  3/14/2012    at [address] Left     tkr    TUBAL LIGATION         Level of Consciousness: Alert, Oriented, and Cooperative = 0    Level of Activity: Mostly sedentary, minimal walking = 2    Respiratory Pattern: Regular Pattern; RR 8-20 = 0    Breath Sounds: Clear = 0    Sputum   ,  ,    Cough: Strong, spontaneous, non-productive = 0    Vital Signs   /62   Pulse 69   Temp 97.4 °F (36.3 °C) (Oral)   Resp 16   Ht 5' 6\" (1.676 m)   Wt 208 lb (94.3 kg)   SpO2 100%   BMI 33.57 kg/m²   SPO2 (COPD values may differ): Greater than or equal to 92% on room air = 0    Peak Flow (asthma only): not applicable = 0    RSI: 0-4 = See once and convert to home regimen or discontinue        Plan       Goals: medication delivery    Patient/caregiver was educated on the proper method of use for Respiratory Care Devices:  Yes      Response to education:  Very Good     Is patient being placed on Home Treatment Regimen? Yes     Does the patient have everything they need prior to discharge? NA     Comments: Pt in with fall from nursing home. No respiratory distress    Plan of Care: spiriva daily, performist and pulmicort bid hhn    Electronically signed by Ronald Ram RCP on 5/13/2019 at 3:10 AM    Respiratory Protocol Guidelines     1. Assessment and treatment by Respiratory Therapy will be initiated for medication and therapeutic interventions upon initiation of aerosolized medication. 2. Physician will be contacted for respiratory rate (RR) greater than 35 breaths per minute. Therapy will be held for heart rate (HR) greater than 140 beats per minute, pending direction from physician. 3. Bronchodilators will be administered via Metered Dose Inhaler (MDI) with spacer when the following criteria are met:  a. Alert and cooperative     b. HR < 140 bpm  c. RR < 30 bpm                d. Can demonstrate a 2-3 second inspiratory hold  4. Bronchodilators will be administered via Hand Held Nebulizer [facility]) to patients when ANY of the following criteria are met  a. Incognizant or uncooperative          b. Patients treated with HHN at Home        c. Unable to demonstrate proper use of MDI with spacer     d. RR > 30 bpm   5. Bronchodilators will be delivered via Metered Dose Inhaler (MDI), HHN, Aerogen to intubated patients on mechanical ventilation.   6. Inhalation medication orders will be delivered and/or substituted as outlined below. Aerosolized Medications Ordering and Administration Guidelines:    1. All Medications will be ordered by a physician, and their frequency and/or modality will be adjusted as defined by the patients Respiratory Severity Index (RSI) score. 2. If the patient does not have documented COPD, consider discontinuing anticholinergics when RSI is less than 9.  3. If the bronchospasm worsens (increased RSI), then the bronchodilator frequency can be increased to a maximum of every 4 hours. If greater than every 4 hours is required, the physician will be contacted. 4. If the bronchospasm improves, the frequency of the bronchodilator can be decreased, based on the patient's RSI, but not less than home treatment regimen frequency. 5. Bronchodilator(s) will be discontinued if patient has a RSI less than 9 and has received no scheduled or as needed treatment for 72  Hrs. Patients Ordered on a Mucolytic Agent:    1. Must always be administered with a bronchodilator. 2. Discontinue if patient experiences worsened bronchospasm, or secretions have lessened to the point that the patient is able to clear them with a cough. Anti-inflammatory and Combination Medications:    1. If the patient lacks prior history of lung disease, is not using inhaled anti-inflammatory medication at home, and lacks wheezing by examination or by history for at least 24 hours, contact physician for possible discontinuation.

## 2019-05-13 NOTE — PROGRESS NOTES
Clinical Pharmacy Consult Note    Admit date: 5/12/2019    Interval update: Acute encephalopathy resolved today. Vitamin K 5 mg IV given this afternoon for INR 12.13. Urine culture growing 100k CFU/mL gram negative merari. ID/Sens pending. Subjective/Objective:  66 YOF presenting with mechanical fall from SNF is admitted for AMS and supratherapeutic INR of 12.5. Patient has an extensive PMH, notably significant for CKD, DM2, h/o DVT, COPD, HTN and schizophrenia. Pharmacy is consulted to dose warfarin/leave discharge dosing recommendations per Dr. Tree Ross anticoagulation regimen:  3 mg daily    Labs:  Date INR Warfarin Dose Other   5/12 12.51 HOLD Vitamin K 5 mg PO x 1   5/13 12.13 HOLD Vitamin K 5 mg IV x 1                 Lab Results   Component Value Date    HGB 11.3 (L) 05/12/2019    HCT 35.0 (L) 05/12/2019     (L) 05/12/2019       Assessment/Plan:  1. Anticoagulation: h/o DVT (Goal INR 2-3)  · Patient's home dose is 3 mg daily  · INR today = 12.13; supratherapeutic. Will continue to HOLD warfarin today. Placeholder entered into [facility] Pharmacy will begin conservative dosing once INR nears therapeutic - unclear etiology for supratherapeutic INR upon admission. Will provide discharge dosing recommendations before patient returns to SNF as appropriate. Unable to provide recommendations at this time as INR still significantly elevated. · Medication profile reviewed for potential drug interactions. Patient is on Rocephin, which may enhance effects of warfarin (increase INR). · Daily INR will be monitored and dose adjustments made as needed. Please call with any questions.   Jerald Vaughn, PharmD, BCPS  5/13/2019 3:07 PM  Wireless: [id number]

## 2019-05-13 NOTE — PLAN OF CARE
Completed bedside swallow evaluation. Please see report in electronic medical record.     Marco Fish M.A., [place]  Speech-Language Pathologist

## 2019-05-13 NOTE — PROGRESS NOTES
Home Med List is complete. Source of medications in list is call to patient's group home ([facility] [phone]).    Patient's nurse states that patient had all her am meds today. Nurse unable to fax over current med list.     Please note:  1. Removed from pt's med list: metformin 850 mg BID (excessive diarrhea), prilosec 40 mg QD  2. Added to pt's med list: levothyroxine 25 mcg, glimepiride 2 mg  3. Changes to pt's med list:   -Per patient's nurse, patient is only taking clozapine 100 mg nightly, not 200 mg BID.  Last filled at [address] [place] [address] #[phone] for 28 day supply on 05/07.   -Per patient's nurse, patient is receiving VPA syrup as 10 ml (500 mg) TID, not nightly.   -Patient is receiving lisinopril 2.5 mg daily, not 10 mg   -Patient receives dulaglutide weekly on Tuesdays   -Patient is currently taking warfarin 3 mg nightly    Lauren Ulrich  PharmD Candidate 2019  5/12/2019 8:16 PM

## 2019-05-13 NOTE — PROGRESS NOTES
Patient drowsy most of this shift, but able to awake easily by voice. Complaining of pain from fall. Bruising on left side of bottom, redness and excoriation noted on coccyx area. Bilateral lower legs with 3+ edema, whit and flaky. Purwik in place, voiding well. In bed with alarm on and call light within reach. Will continue to monitor.

## 2019-05-13 NOTE — PLAN OF CARE
Problem: Falls - Risk of:  Goal: Will remain free from falls  Description  Will remain free from falls  Outcome: Ongoing  Note:   Patient remains free from falls. In bed with alarm on and call light within reach. Patient making no attempts to get out of bed without help from staff. Will continue to monitor.

## 2019-05-14 PROBLEM — R79.1 ELEVATED INR: Status: RESOLVED | Noted: 2019-05-12 | Resolved: 2019-05-14

## 2019-05-14 PROBLEM — G93.41 ACUTE METABOLIC ENCEPHALOPATHY: Status: RESOLVED | Noted: 2019-05-12 | Resolved: 2019-05-14

## 2019-05-14 LAB
ESTIMATED AVERAGE GLUCOSE: 260.4 MG/DL
GLUCOSE BLD-MCNC: 206 MG/DL (ref 70–99)
GLUCOSE BLD-MCNC: 208 MG/DL (ref 70–99)
GLUCOSE BLD-MCNC: 220 MG/DL (ref 70–99)
GLUCOSE BLD-MCNC: 279 MG/DL (ref 70–99)
HBA1C MFR BLD: 10.7 %
INR BLD: 1.49 (ref 0.86–1.14)
ORGANISM: ABNORMAL
PERFORMED ON: ABNORMAL
PROTHROMBIN TIME: 17 SEC (ref 9.8–13)
URINE CULTURE, ROUTINE: ABNORMAL
URINE CULTURE, ROUTINE: ABNORMAL

## 2019-05-14 PROCEDURE — 6370000000 HC RX 637 (ALT 250 FOR IP): Performed by: INTERNAL MEDICINE

## 2019-05-14 PROCEDURE — 94761 N-INVAS EAR/PLS OXIMETRY MLT: CPT

## 2019-05-14 PROCEDURE — 97166 OT EVAL MOD COMPLEX 45 MIN: CPT

## 2019-05-14 PROCEDURE — 85610 PROTHROMBIN TIME: CPT

## 2019-05-14 PROCEDURE — 97530 THERAPEUTIC ACTIVITIES: CPT

## 2019-05-14 PROCEDURE — 94640 AIRWAY INHALATION TREATMENT: CPT

## 2019-05-14 PROCEDURE — 97535 SELF CARE MNGMENT TRAINING: CPT

## 2019-05-14 PROCEDURE — 2580000003 HC RX 258: Performed by: INTERNAL MEDICINE

## 2019-05-14 PROCEDURE — 97162 PT EVAL MOD COMPLEX 30 MIN: CPT

## 2019-05-14 PROCEDURE — 36415 COLL VENOUS BLD VENIPUNCTURE: CPT

## 2019-05-14 PROCEDURE — 6360000002 HC RX W HCPCS: Performed by: INTERNAL MEDICINE

## 2019-05-14 PROCEDURE — 1200000000 HC SEMI PRIVATE

## 2019-05-14 PROCEDURE — 97116 GAIT TRAINING THERAPY: CPT

## 2019-05-14 RX ORDER — CIPROFLOXACIN 250 MG/1
250 TABLET, FILM COATED ORAL 2 TIMES DAILY
Qty: 6 TABLET | Refills: 0 | Status: SHIPPED | OUTPATIENT
Start: 2019-05-14 | End: 2019-05-17

## 2019-05-14 RX ORDER — FERROUS SULFATE 325(65) MG
325 TABLET ORAL
Status: DISCONTINUED | OUTPATIENT
Start: 2019-05-15 | End: 2019-05-16 | Stop reason: HOSPADM

## 2019-05-14 RX ORDER — AMOXICILLIN AND CLAVULANATE POTASSIUM 875; 125 MG/1; MG/1
1 TABLET, FILM COATED ORAL EVERY 12 HOURS SCHEDULED
Status: DISCONTINUED | OUTPATIENT
Start: 2019-05-15 | End: 2019-05-16 | Stop reason: HOSPADM

## 2019-05-14 RX ORDER — GLIPIZIDE 2.5 MG/1
2.5 TABLET, EXTENDED RELEASE ORAL DAILY
Qty: 30 TABLET | Refills: 3 | Status: SHIPPED | OUTPATIENT
Start: 2019-05-14 | End: 2019-05-16 | Stop reason: SDUPTHER

## 2019-05-14 RX ORDER — CIPROFLOXACIN 500 MG/1
500 TABLET, FILM COATED ORAL EVERY 12 HOURS SCHEDULED
Status: DISCONTINUED | OUTPATIENT
Start: 2019-05-14 | End: 2019-05-14

## 2019-05-14 RX ADMIN — CEFTRIAXONE 1 G: 1 INJECTION, POWDER, FOR SOLUTION INTRAMUSCULAR; INTRAVENOUS at 08:47

## 2019-05-14 RX ADMIN — INSULIN LISPRO 4 UNITS: 100 INJECTION, SOLUTION INTRAVENOUS; SUBCUTANEOUS at 11:58

## 2019-05-14 RX ADMIN — VALPROIC ACID 500 MG: 250 SOLUTION ORAL at 08:47

## 2019-05-14 RX ADMIN — INSULIN LISPRO 4 UNITS: 100 INJECTION, SOLUTION INTRAVENOUS; SUBCUTANEOUS at 17:09

## 2019-05-14 RX ADMIN — BUDESONIDE 500 MCG: 0.5 SUSPENSION RESPIRATORY (INHALATION) at 21:06

## 2019-05-14 RX ADMIN — FERROUS SULFATE TAB 325 MG (65 MG ELEMENTAL FE) 325 MG: 325 (65 FE) TAB at 08:48

## 2019-05-14 RX ADMIN — VALPROIC ACID 500 MG: 250 SOLUTION ORAL at 21:26

## 2019-05-14 RX ADMIN — ENOXAPARIN SODIUM 40 MG: 40 INJECTION SUBCUTANEOUS at 21:25

## 2019-05-14 RX ADMIN — LEVOTHYROXINE SODIUM 25 MCG: 25 TABLET ORAL at 06:44

## 2019-05-14 RX ADMIN — PANTOPRAZOLE SODIUM 40 MG: 40 TABLET, DELAYED RELEASE ORAL at 06:44

## 2019-05-14 RX ADMIN — VALPROIC ACID 500 MG: 250 SOLUTION ORAL at 14:14

## 2019-05-14 RX ADMIN — Medication 10 ML: at 08:47

## 2019-05-14 RX ADMIN — INSULIN LISPRO 6 UNITS: 100 INJECTION, SOLUTION INTRAVENOUS; SUBCUTANEOUS at 08:49

## 2019-05-14 RX ADMIN — ARIPIPRAZOLE 15 MG: 5 TABLET ORAL at 21:24

## 2019-05-14 RX ADMIN — ACETAMINOPHEN 650 MG: 325 TABLET ORAL at 21:25

## 2019-05-14 RX ADMIN — FORMOTEROL FUMARATE DIHYDRATE 20 MCG: 20 SOLUTION RESPIRATORY (INHALATION) at 09:08

## 2019-05-14 RX ADMIN — CHOLECALCIFEROL (VITAMIN D3) 10 MCG (400 UNIT) TABLET 2000 UNITS: at 21:25

## 2019-05-14 RX ADMIN — FORMOTEROL FUMARATE DIHYDRATE 20 MCG: 20 SOLUTION RESPIRATORY (INHALATION) at 21:12

## 2019-05-14 RX ADMIN — ATORVASTATIN CALCIUM 10 MG: 10 TABLET, FILM COATED ORAL at 17:19

## 2019-05-14 RX ADMIN — CLOZAPINE 100 MG: 100 TABLET ORAL at 21:26

## 2019-05-14 RX ADMIN — SPIRONOLACTONE 25 MG: 25 TABLET ORAL at 08:48

## 2019-05-14 RX ADMIN — INSULIN LISPRO 2 UNITS: 100 INJECTION, SOLUTION INTRAVENOUS; SUBCUTANEOUS at 21:45

## 2019-05-14 RX ADMIN — FUROSEMIDE 20 MG: 20 TABLET ORAL at 08:47

## 2019-05-14 RX ADMIN — Medication 10 ML: at 21:49

## 2019-05-14 RX ADMIN — CYANOCOBALAMIN TAB 1000 MCG 1000 MCG: 1000 TAB at 08:48

## 2019-05-14 RX ADMIN — TIOTROPIUM BROMIDE 18 MCG: 18 CAPSULE ORAL; RESPIRATORY (INHALATION) at 09:08

## 2019-05-14 RX ADMIN — FUROSEMIDE 20 MG: 20 TABLET ORAL at 21:25

## 2019-05-14 RX ADMIN — BUDESONIDE 500 MCG: 0.5 SUSPENSION RESPIRATORY (INHALATION) at 09:03

## 2019-05-14 ASSESSMENT — PAIN DESCRIPTION - LOCATION
LOCATION: LEG
LOCATION: LEG

## 2019-05-14 ASSESSMENT — PAIN SCALES - WONG BAKER: WONGBAKER_NUMERICALRESPONSE: 8

## 2019-05-14 ASSESSMENT — PAIN SCALES - GENERAL
PAINLEVEL_OUTOF10: 0
PAINLEVEL_OUTOF10: 3
PAINLEVEL_OUTOF10: 0

## 2019-05-14 ASSESSMENT — PAIN DESCRIPTION - PROGRESSION: CLINICAL_PROGRESSION: NOT CHANGED

## 2019-05-14 ASSESSMENT — PAIN DESCRIPTION - ONSET: ONSET: ON-GOING

## 2019-05-14 ASSESSMENT — PAIN DESCRIPTION - ORIENTATION
ORIENTATION: RIGHT
ORIENTATION: RIGHT

## 2019-05-14 ASSESSMENT — PAIN DESCRIPTION - FREQUENCY: FREQUENCY: CONTINUOUS

## 2019-05-14 ASSESSMENT — PAIN DESCRIPTION - PAIN TYPE
TYPE: ACUTE PAIN
TYPE: ACUTE PAIN

## 2019-05-14 ASSESSMENT — PAIN DESCRIPTION - DESCRIPTORS: DESCRIPTORS: ACHING

## 2019-05-14 ASSESSMENT — PAIN - FUNCTIONAL ASSESSMENT: PAIN_FUNCTIONAL_ASSESSMENT: ACTIVITIES ARE NOT PREVENTED

## 2019-05-14 NOTE — PROGRESS NOTES
Patient's VSS, neuro at patient's baseline, patient not complaining of pain. Patient slept very little during the night, mostly stayed awake watching TV. Will continue to monitor.

## 2019-05-14 NOTE — PROGRESS NOTES
light. Conjunctivae/corneas clear. Neck: Supple, with full range of motion. No jugular venous distention. Trachea midline. Respiratory:  Normal respiratory effort. Clear to auscultation, bilaterally without Rales/Wheezes/Rhonchi. Cardiovascular: Regular rate and rhythm with normal S1/S2 without murmurs, rubs or gallops. Abdomen: Soft, non-tender, non-distended with normal bowel sounds. Musculoskeletal: 3+ pitting edema bilaterally with chronic stasis changes  Skin: Skin color, texture, turgor normal.  No rashes or lesions. Neurologic:  Neurovascularly intact without any focal sensory/motor deficits. Cranial nerves: II-XII intact, grossly non-focal.  Psychiatric: Alert and oriented, thought content appropriate, normal insight  Capillary Refill: Brisk,< 3 seconds   Peripheral Pulses: +2 palpable, equal bilaterally       Labs:   Recent Labs     05/12/19  1143   WBC 6.8   HGB 11.3*   HCT 35.0*   *     Recent Labs     05/12/19  1143      K 4.5   CL 95*   CO2 27   BUN 23*   CREATININE 0.8   CALCIUM 9.5     Recent Labs     05/12/19  1143   AST 25   ALT 55*   BILIDIR <0.2   BILITOT <0.2   ALKPHOS 168*     Recent Labs     05/12/19  1143 05/13/19  0527 05/14/19  0539   INR 12.51* 12.13* 1.49*     No results for input(s): Shaista Karimi in the last 72 hours. Urinalysis:      Lab Results   Component Value Date    NITRU POSITIVE 05/12/2019    WBCUA 6-10 05/12/2019    BACTERIA 4+ 05/12/2019    RBCUA 0-2 05/12/2019    BLOODU Negative 05/12/2019    SPECGRAV 1.020 05/12/2019    GLUCOSEU Negative 05/12/2019       Radiology:  CT Head WO Contrast   Final Result      No acute intracranial hemorrhage or mass effect. Small scalp hematoma is noted. XR TIBIA FIBULA LEFT (2 VIEWS)   Final Result      No acute abnormality in the left femur, left knee, left tibia/fibula, right femur, right knee, or right tibia/fibula.       XR TIBIA FIBULA RIGHT (2 VIEWS)   Final Result      No acute abnormality in the left femur, left knee, left tibia/fibula, right femur, right knee, or right tibia/fibula. XR KNEE RIGHT (1-2 VIEWS)   Final Result      No acute abnormality in the left femur, left knee, left tibia/fibula, right femur, right knee, or right tibia/fibula. XR KNEE LEFT (1-2 VIEWS)   Final Result      No acute abnormality in the left femur, left knee, left tibia/fibula, right femur, right knee, or right tibia/fibula. XR FEMUR RIGHT (MIN 2 VIEWS)   Final Result      No acute abnormality in the left femur, left knee, left tibia/fibula, right femur, right knee, or right tibia/fibula. XR FEMUR LEFT (MIN 2 VIEWS)   Final Result      No acute abnormality in the left femur, left knee, left tibia/fibula, right femur, right knee, or right tibia/fibula. Assessment/Plan:    Active Hospital Problems    Diagnosis    Frequent falls [R29.6]    Diabetes mellitus due to underlying condition with hyperglycemia ([facility] [E08.65]    Schizophrenia ([facility] [F20.9]    Fall [W19. XXXA]    UTI (urinary tract infection) [N39.0]       Acute metabolic encephalopathy seems to have resolved  Unclear cause of elevated INR, however resolved now  Discussed with patient that with frequent falls, risk of bleeding is much higher. Unclear why she is on coumadin. Has reported hx of DVT, unclear if this was recurrent or if she had atrial fib at some point. Her cardiologist's notes do not mention any arrhythmia. Will stop coumadin for now, and instruct her to follow up with cardiologist in 7-10 days post discharge.  If she needs anticoagulation for arrhythmia or recurrent DVT/PE and benefits outweigh risk, maybe DOAC would be a better option  transitioned to PO ABx for UTI  Add SSI, hypoglycemia protocol      DVT Prophylaxis: lovenox  Diet: DIET GENERAL;  Code Status: Full Code    PT/OT Eval Status: ordered    Dispo - inpatient, ready for discharge pending placement    Jose L Aguila MD  2:28 PM 5/14/2019

## 2019-05-14 NOTE — PROGRESS NOTES
Occupational Therapy   Occupational Therapy Initial Assessment and Treatment Note   Date: 2019   Patient Name: Андрей Sears  MRN: [id number]     : 1955    Date of Service: 2019    Discharge Recommendations:  Андрей Sears scored a 13/24 on the AM-PAC ADL Inpatient form. Current research shows that an AM-PAC score of 17 or less is typically not associated with a discharge to the patient's home setting. Based on the patients AM-PAC score and their current ADL deficits, it is recommended that the patient have 3-5 sessions per week of Occupational Therapy at d/c to increase the patients independence. OT Equipment Recommendations  Equipment Needed: No  Other: defer to [facility]     Assessment   Performance deficits / Impairments: Decreased ADL status; Decreased functional mobility ; Decreased cognition;Decreased safe awareness;Decreased endurance  Assessment: Pt from group home. Pt with very flaky / crusty BLEs -- Pt resistive to therapy - self limiting behavior throughout session with mobility attempts 2/2 fear / pain? Pt needing Max A x2 for sit to stand and Mod A and Min A x1 for functional transfers. Pt needing Max A with LE ADLs and Mod A with UE ADLs. Would benefit from inpt OT at d/c. Will follow as inpt. Treatment Diagnosis: Impaired ADLs/ functional transfers / poor endurance / insight   Prognosis: Fair;Poor  Decision Making: Medium Complexity  Patient Education: role of OT / activity promotion- verb partial understanding / reteach   REQUIRES OT FOLLOW UP: Yes  Activity Tolerance  Activity Tolerance: Patient limited by fatigue;Patient limited by pain;Treatment limited secondary to decreased cognition  Activity Tolerance: Pt very anxious with mobility attempts. Pt with self limiting behaviors. Needs max encouragement   Safety Devices  Safety Devices in place: Yes  Type of devices: Chair alarm in place;Nurse notified; Left in chair;Call light within reach           Patient Tone: Normotonic  Tone LUE  LUE Tone: Normotonic  Coordination  Movements Are Fluid And Coordinated: Yes     Bed mobility  Supine to Sit: Dependent/Total;2 Person assistance(max A x 2)  Scooting: Dependent/Total(max A to scoot EOB, able to scoot back in chair SBA. )  Transfers  Sit to stand: Dependent/Total;2 Person assistance(Max A x 2 )  Stand to sit: Dependent/Total;2 Person assistance  Transfer Comments: pt demo poor effort with sit to stand. needing several attempts to manage full stance      Vision - Basic Assessment  Prior Vision: Wears glasses all the time  Cognition  Overall Cognitive Status: Exceptions  Arousal/Alertness: Appropriate responses to stimuli  Following Commands: Follows one step commands with increased time; Follows one step commands with repetition  Memory: Decreased recall of recent events;Decreased short term memory  Safety Judgement: Decreased awareness of need for assistance  Insights: Decreased awareness of deficits  Initiation: Requires cues for some  Sequencing: Requires cues for some        Sensation  Overall Sensation Status: WFL        LUE AROM (degrees)  LUE AROM : WFL  Left Hand AROM (degrees)  Left Hand AROM: WFL  RUE AROM (degrees)  RUE AROM : WFL  Right Hand AROM (degrees)  Right Hand AROM: WFL  LUE Strength  Gross LUE Strength: WFL  L Hand Grasp: 3+/5  L Hand Release: 3+/5  RUE Strength  Gross RUE Strength: WFL  R Hand Grasp: 4/5  R Hand Release: 4/5     Plan This note will serve as a discharge summary if patient is discharged from hospital before next treatment session.   Plan  Times per week: 2-5x  Times per day: Daily  Current Treatment Recommendations: Functional Mobility Training, Endurance Training, Equipment Evaluation, Education, & procurement, Self-Care / ADL, Safety Education & Training, Patient/Caregiver Education & Training    AM-PAC Score  AM-PAC Inpatient Daily Activity Raw Score: 13  AM-PAC Inpatient ADL T-Scale Score : 32.03  ADL Inpatient CMS 0-100% Score: 63.03  ADL Inpatient CMS G-Code Modifier : CL    Goals  Short term goals  Time Frame for Short term goals: at d/c  Short term goal 1: Stance x 3 min with Min A for ADLs  Short term goal 2: LE Dressing with Mod A with AE prn  Short term goal 3: Commode transfers with Mod A   Short term goal 4: Grooming with setup    Patient Goals   Patient goals : not stated      Therapy Time   Individual Concurrent Group Co-treatment   Time In 1018         Time Out 1057         Minutes 39              Timed Code Treatment Minutes:  23 mins     Total Treatment Minutes:  39 mins       Pacheco Sanchez, OT

## 2019-05-14 NOTE — PLAN OF CARE
Problem: Falls - Risk of:  Goal: Absence of physical injury  Description  Absence of physical injury  Outcome: Ongoing  Note:   Patient will remain free from physical injury. Bed is in lowest position with wheels locked. Call light is within reach. Problem: Pain:  Goal: Control of acute pain  Description  Control of acute pain  Outcome: Ongoing  Note:   Patient's pain will continue to be controlled per the [facility]. Problem: Risk for Impaired Skin Integrity  Goal: Tissue integrity - skin and mucous membranes  Description  Structural intactness and normal physiological function of skin and  mucous membranes. Outcome: Ongoing  Note:   Patient will change position with the help of staff to maintain skin integrity.

## 2019-05-14 NOTE — CARE COORDINATION
Called Michelle Delgado pt's  [phone] and left a message with updates on this patient.      Electronically signed by Lj Ryan RN on 5/14/2019 at 10:49 AM  [phone]

## 2019-05-14 NOTE — PROGRESS NOTES
Clinical Pharmacy Consult Note    Admit date: 5/12/2019    Interval update: Acute encephalopathy resolved today. Urine culture growing E.Coli. Warfarin therapy has been d/c for now given unclear indication (reported h/o DVT vs Afib) and high fall risk. Patient is to follow-up as an outpatient. Subjective/Objective:  66 YOF presenting with mechanical fall from SNF is admitted for AMS and supratherapeutic INR of 12.5. Patient has an extensive PMH, notably significant for CKD, DM2, h/o DVT, COPD, HTN and schizophrenia. Pharmacy is consulted to dose warfarin/leave discharge dosing recommendations per Dr. Asha Engel anticoagulation regimen:  3 mg daily    Labs:  Date INR Warfarin Dose Other   5/12 12.51 HOLD Vitamin K 5 mg PO x 1   5/13 12.13 HOLD Vitamin K 5 mg IV x 1   5/14 1.49 -- --           Lab Results   Component Value Date    HGB 11.3 (L) 05/12/2019    HCT 35.0 (L) 05/12/2019     (L) 05/12/2019       Assessment/Plan:  1. Anticoagulation: h/o DVT (Goal INR 2-3) -- now d/c'd. · Anticoagulation with warfarin d/c'd as per above. Patient to follow-up with cardiology at discharge. Pharmacy will sign off. Please re-consult pharmacy if future dosing assistance is needed. Please call with any questions.   Henrique Coker, PharmD, BCPS  5/14/2019 3:17 PM  Wireless: [id number]

## 2019-05-14 NOTE — DISCHARGE INSTR - COC
Continuity of Care Form    Patient Name: Víctor Atkins   :  1955  MRN:  [id number]    Admit date:  2019  Discharge date:  ***    Code Status Order: Full Code   Advance Directives:     Admitting Physician:  Sarai Eisenberg MD  PCP: JC Lopez - CNP    Discharging Nurse: [facility] Unit/Room#: 4858/2621-53  Discharging Unit Phone Number: ***    Emergency Contact:   Extended Emergency Contact Information  Primary Emergency Contact: KATHIA Fraser [facility] [address] [facility] Phone: [phone]  Relation: Other  Secondary Emergency Contact: Arin Fraser [facility] [address] Phone: [phone]  Relation: Other    Past Surgical History:  Past Surgical History:   Procedure Laterality Date    COLONOSCOPY  3/14/2012    at [address] Left     tkr    TUBAL LIGATION         Immunization History:   Immunization History   Administered Date(s) Administered    Pneumococcal Polysaccharide ([id number]) 10/21/2015       Active Problems:  Patient Active Problem List   Diagnosis Code    COPD (chronic obstructive pulmonary disease) ([facility] [address] J44.9    Abdominal pain R10.9    Colitis K52.9    Schizophrenia ([facility] [address] F20.9    Fall W19. Pablo Maker UTI (urinary tract infection) N39.0    Frequent falls R29.6    Diabetes mellitus due to underlying condition with hyperglycemia (HCC) E08.65       Isolation/Infection:   Isolation          No Isolation            Nurse Assessment:  Last Vital Signs: /69   Pulse 105   Temp 97.8 °F (36.6 °C) (Oral)   Resp 16   Ht 5' 6\" (1.676 m)   Wt 208 lb (94.3 kg)   SpO2 96%   BMI 33.57 kg/m²     Last documented pain score (0-10 scale): Pain Level: 0  Last Weight:   Wt Readings from Last 1 Encounters:   19 208 lb (94.3 kg)     Mental Status:  oriented    IV Access:  - None    Nursing Mobility/ADLs:  Walking   Assisted  Transfer  Assisted  Bathing  Dependent  Dressing Dependent  Toileting  Dependent  Feeding  Assisted  Med Admin  Assisted  Med Delivery   crushed and prefers mixed with applesauce    Wound Care Documentation and Therapy:        Elimination:  Continence:   · Bowel: Yes  · Bladder: Yes  Urinary Catheter: None   Colostomy/Ileostomy/Ileal Conduit: No       Date of Last BM: 5-15    Intake/Output Summary (Last 24 hours) at 5/14/2019 1150  Last data filed at 5/14/2019 0750  Gross per 24 hour   Intake 600 ml   Output 1700 ml   Net -1100 ml     I/O last 3 completed shifts: In: 840 [P.O.:840]  Out: 1800 [Urine:1800]    Safety Concerns: At Risk for Falls    Impairments/Disabilities:      childlike     sore scaley red legs    Nutrition Therapy:  Current Nutrition Therapy:   - Oral Diet:  General    Routes of Feeding: Oral  Liquids: Thin Liquids  Daily Fluid Restriction: no  Last Modified Barium Swallow with Video (Video Swallowing Test): not done    Treatments at the Time of Hospital Discharge:   Respiratory Treatments: ***  Oxygen Therapy:  is not on home oxygen therapy.   Ventilator:    - No ventilator support    Rehab Therapies: Physical Therapy  Weight Bearing Status/Restrictions: No weight bearing restirctions  Other Medical Equipment (for information only, NOT a DME order):  walker and jaqueline stedy  Other Treatments: ***    Patient's personal belongings (please select all that are sent with patient):  {[facility] DME Belongings:[id number]}    RN SIGNATURE:  Electronically signed by Niharika Law RN on 5/16/19 at 2:44 PM    CASE MANAGEMENT/SOCIAL WORK SECTION    Inpatient Status Date:05/13/2019    Readmission Risk Assessment Score:  Readmission Risk              Risk of Unplanned Readmission:        23           Discharging to Facility/ Agency   · Name: [facility]  · Address:  · Phone:[phone]  · Fax:[phone]    ·     / signature: Electronically signed by Pari Skinner RN on 5/16/19 at 10:01 AM    PHYSICIAN SECTION    Prognosis: Fair    Condition at Discharge: Stable    Rehab Potential (if transferring to Rehab): Good    Recommended Labs or Other Treatments After Discharge:     Constantin Eisenberg, APRN - CNP  [address] [place] [address]  [phone]          Stop coumadin, do not restart until seen by cardiologist      Physician Certification: I certify the above information and transfer of Esmer Akins  is necessary for the continuing treatment of the diagnosis listed and that she requires Intermediate/Mental Retardation/Developmental Disabilities Care for greater 30 days.      Update Admission H&P: No change in H&P    PHYSICIAN SIGNATURE:  Electronically signed by Amira Adams MD on 19 at 11:52 AM

## 2019-05-14 NOTE — PROGRESS NOTES
Physical Therapy    Facility/Department: [facility] [address]  Initial Assessment    NAME: Osman Morales  : 1955  MRN: [id number]    Date of Service: 2019    Discharge Recommendations:[facility]  on the AM-PAC short mobility form. Current research shows that an AM-PAC score of 17 or less is typically not associated with a discharge to the patient's home setting. Based on the patients AM-PAC score and their current functional mobility deficits, it is recommended that the patient have 3-5 sessions per week of Physical Therapy at d/c to increase the patients independence. PT Equipment Recommendations  Equipment Needed: No    Assessment   Body structures, Functions, Activity limitations: Decreased functional mobility ; Decreased ADL status; Decreased strength;Decreased cognition;Decreased endurance;Decreased balance;Decreased safe awareness; Increased Pain  Assessment: Pt with pain and decreased mobility following falls. Pt is not functioning at her normal level and needing skilled PT to address these issues. Pt with significant LE cellulitis issues and would benefit from Wound care nursing assessment. Treatment Diagnosis: weakness, difficulty with gait. Prognosis: Fair  Decision Making: Medium Complexity  Patient Education: PT POC, DC recommendations. REQUIRES PT FOLLOW UP: Yes  Activity Tolerance  Activity Tolerance: Patient limited by pain       Patient Diagnosis(es): The primary encounter diagnosis was INR (international normal ratio) abnormal. Diagnoses of Fall, initial encounter and Thrombocytopenia ([address] were also pertinent to this visit.      has a past medical history of Chronic mental illness, Chronic pain, CKD (chronic kidney disease) stage 1, GFR 90 ml/min or greater, COPD (chronic obstructive pulmonary disease) ([address] Depression, Diabetes mellitus, type 2 ([address] Diabetic neuropathy ([address] GERD (gastroesophageal reflux disease), History of DVT (deep vein thrombosis), History of primary hyperparathyroidism, Hypertension, Lymphedema of leg, Nasal bone fracture, Normal stress echocardiogram, OA (osteoarthritis), and Schizophrenia ([facility] [address] has a past surgical history that includes Tubal ligation; Colonoscopy (3/14/2012); and joint replacement (Left). Restrictions  Restrictions/Precautions  Restrictions/Precautions: Fall Risk(high fall risk)  Position Activity Restriction  Other position/activity restrictions: up with asssistance    Vision/Hearing  Vision: Impaired  Vision Exceptions: Wears glasses at all times  Hearing: Within functional limits     Subjective  General  Chart Reviewed: Yes  Additional Pertinent Hx: xrays neg  Response To Previous Treatment: Patient with no complaints from previous session. Family / Caregiver Present: Yes  Diagnosis: elevated INR, fall  Follows Commands: Impaired  General Comment  Comments: Pt supine in bed upon arrival. Pt brief and bed soaked in urine. Little pain at rest.   Subjective  Subjective: Pt reporting pain R LE. Resistant to movement.    Pain Screening  Patient Currently in Pain: Yes  Pain Assessment  Pain Assessment: Faces  Lucero-Baker Pain Rating: Hurts whole lot  Pain Type: Acute pain  Pain Location: Leg  Pain Orientation: Right  Vital Signs  Patient Currently in Pain: Yes       Orientation  Orientation  Overall Orientation Status: Impaired  Orientation Level: Oriented to situation;Oriented to person;Oriented to place  Social/Functional History  Social/Functional History  Lives With: ([facility]  Type of Home: Facility  Home Layout: One level  Home Access: Stairs to enter with rails(5 REN)  Bathroom Shower/Tub: Walk-in shower  Bathroom Toilet: Standard  Home Equipment: Rolling walker  ADL Assistance: Needs assistance  Homemaking Assistance: Needs assistance  Homemaking Responsibilities: No  Ambulation Assistance: Independent  Transfer Assistance: Independent  Active : No  Mode of Transportation: (\"Medicare is a company that picks you up if you have to go to the doctor\")  Education: 2 years of college  Occupation: (Never worked)  Type of occupation: Never worked   Leisure & Hobbies: [address]  Additional Comments: Pt reports LE cellulitis, asssist with bathing 2x/week. Pt reports multiple falls in past 6 mo. Pt is a poor historian   Cognition        Objective     Observation/Palpation  Observation: flaking skin bilat LEs, healing wounds from cellulitis. Bruising hip/buttocks area. Edema: bilat LE edema below knees. AROM RLE (degrees)  RLE AROM: WFL  RLE General AROM: Pt painful and resisting movements bilat LEs  AROM LLE (degrees)  LLE AROM : WFL  LLE General AROM: Pt painful and resisting movements bilat LEs  Strength RLE  Comment: grossly +2/5  Strength LLE  Comment: grossly +3/5  Strength Other  Other: difficulty with MMT due to pain and pt not following VCs     Sensation  Overall Sensation Status: WFL  Bed mobility  Supine to Sit: Dependent/Total;2 Person assistance(max A x 2)  Scooting: Dependent/Total(max A to scoot EOB, able to scoot back in chair SBA. )  Comment: Pt remained seated in chair. Transfers  Sit to Stand: Dependent/Total;2 Person Assistance(max A x 2)  Stand to sit: Dependent/Total;2 Person Assistance(max A x 2)  Bed to Chair: Dependent/Total(max A x 2)  Ambulation  Ambulation?: Yes  Ambulation 1  Surface: level tile  Device: Rolling Walker  Assistance: Dependent/Total;2 Person assistance(min A x 1, mod A x 1)  Quality of Gait: antalgic limping R LE. Distance: Pt took 5-6 steps to turn and sit in chair with RW and min A x1/mod A x 1. Comments: Pt painful and R LE buckling in WB. Pt crying out in pain with movement. Stairs/Curb  Stairs?: No     Balance  Posture: Fair(forward head and rounded shoulders.)  Sitting - Static: Fair  Sitting - Dynamic: Fair  Standing - Static: Fair;-  Standing - Dynamic: Fair;-  Comments: lean to L side seated, buckling LEs in WB.          Plan   Plan  Times per week: 2-5x/week  Times per day: Daily  Current Treatment Recommendations: Strengthening, ROM, Balance Training, Functional Mobility Training, Transfer Training, Endurance Training, Gait Training, Safety Education & Training, Pain Management, Patient/Caregiver Education & Training, Equipment Evaluation, Education, & procurement, Cognitive Reorientation  Safety Devices  Type of devices: All fall risk precautions in place, Call light within reach, Chair alarm in place, Gait belt, Nurse notified, Left in chair, Patient at risk for falls  Restraints  Initially in place: No    G-Code       OutComes Score                                                  AM-PAC Score  AM-PAC Inpatient Mobility Raw Score : 8  AM-PAC Inpatient T-Scale Score : 28.52  Mobility Inpatient CMS 0-100% Score: 86.62  Mobility Inpatient CMS G-Code Modifier : CM          Goals  Short term goals  Time Frame for Short term goals: To be met by DC. Short term goal 1: Pt to perform bed mob with SBA. Short term goal 2: Pt to perform transfers with min A. Short term goal 3: Pt to perform amb with AAD and CGA for 50 ft. Long term goals  Time Frame for Long term goals : LTGs=STGs  Patient Goals   Patient goals : To go back to home, decrease pain.        Therapy Time   Individual Concurrent Group Co-treatment   Time In 1018         Time Out 1056         Minutes 38         Timed Code Treatment Minutes: [address] [place] [address]

## 2019-05-14 NOTE — CARE COORDINATION
Received a call back from [facility] and they are not able to accept this patient at this time. Will need to find an alternative facility.      Electronically signed by Hi King RN on 5/14/2019 at 5:01 PM  [phone]

## 2019-05-14 NOTE — PLAN OF CARE
Problem: Falls - Risk of:  Goal: Will remain free from falls  Description  Will remain free from falls  Outcome: Ongoing  Note:   Patient has remained free from falls. Bed is low and locked, bed alarm on, side rails up 2/4, non skid socks on patient. Call light and bedside table are within reach. Patient calls out appropriately. Will continue to monitor. Problem: Pain:  Goal: Pain level will decrease  Description  Pain level will decrease  Outcome: Ongoing  Note:   Patient denies pain at this time. Will continue to monitor.

## 2019-05-14 NOTE — PROGRESS NOTES
Patient up to chair for the afternoon. Patient tolerated using the jaqueline steady. Legs cleaned well with soap and water and lotion applied. Patient tolerating diet and voiding well. Will continue to monitor.

## 2019-05-15 LAB
GLUCOSE BLD-MCNC: 206 MG/DL (ref 70–99)
GLUCOSE BLD-MCNC: 219 MG/DL (ref 70–99)
GLUCOSE BLD-MCNC: 227 MG/DL (ref 70–99)
GLUCOSE BLD-MCNC: 308 MG/DL (ref 70–99)
INR BLD: 1.25 (ref 0.86–1.14)
PERFORMED ON: ABNORMAL
PROTHROMBIN TIME: 14.3 SEC (ref 9.8–13)

## 2019-05-15 PROCEDURE — 6360000002 HC RX W HCPCS: Performed by: INTERNAL MEDICINE

## 2019-05-15 PROCEDURE — 6370000000 HC RX 637 (ALT 250 FOR IP): Performed by: INTERNAL MEDICINE

## 2019-05-15 PROCEDURE — 36415 COLL VENOUS BLD VENIPUNCTURE: CPT

## 2019-05-15 PROCEDURE — 94640 AIRWAY INHALATION TREATMENT: CPT

## 2019-05-15 PROCEDURE — 85610 PROTHROMBIN TIME: CPT

## 2019-05-15 PROCEDURE — 1200000000 HC SEMI PRIVATE

## 2019-05-15 PROCEDURE — 2580000003 HC RX 258: Performed by: INTERNAL MEDICINE

## 2019-05-15 PROCEDURE — 94761 N-INVAS EAR/PLS OXIMETRY MLT: CPT

## 2019-05-15 RX ADMIN — FUROSEMIDE 20 MG: 20 TABLET ORAL at 20:46

## 2019-05-15 RX ADMIN — FORMOTEROL FUMARATE DIHYDRATE 20 MCG: 20 SOLUTION RESPIRATORY (INHALATION) at 08:09

## 2019-05-15 RX ADMIN — VALPROIC ACID 500 MG: 250 SOLUTION ORAL at 08:33

## 2019-05-15 RX ADMIN — INSULIN LISPRO 4 UNITS: 100 INJECTION, SOLUTION INTRAVENOUS; SUBCUTANEOUS at 21:12

## 2019-05-15 RX ADMIN — ACETAMINOPHEN 650 MG: 325 TABLET ORAL at 11:40

## 2019-05-15 RX ADMIN — CYANOCOBALAMIN TAB 1000 MCG 1000 MCG: 1000 TAB at 08:33

## 2019-05-15 RX ADMIN — FERROUS SULFATE TAB 325 MG (65 MG ELEMENTAL FE) 325 MG: 325 (65 FE) TAB at 11:40

## 2019-05-15 RX ADMIN — LEVOTHYROXINE SODIUM 25 MCG: 25 TABLET ORAL at 06:44

## 2019-05-15 RX ADMIN — PANTOPRAZOLE SODIUM 40 MG: 40 TABLET, DELAYED RELEASE ORAL at 06:44

## 2019-05-15 RX ADMIN — VALPROIC ACID 500 MG: 250 SOLUTION ORAL at 20:50

## 2019-05-15 RX ADMIN — ENOXAPARIN SODIUM 40 MG: 40 INJECTION SUBCUTANEOUS at 20:46

## 2019-05-15 RX ADMIN — INSULIN LISPRO 4 UNITS: 100 INJECTION, SOLUTION INTRAVENOUS; SUBCUTANEOUS at 17:09

## 2019-05-15 RX ADMIN — INSULIN LISPRO 4 UNITS: 100 INJECTION, SOLUTION INTRAVENOUS; SUBCUTANEOUS at 11:38

## 2019-05-15 RX ADMIN — INSULIN LISPRO 4 UNITS: 100 INJECTION, SOLUTION INTRAVENOUS; SUBCUTANEOUS at 08:33

## 2019-05-15 RX ADMIN — ATORVASTATIN CALCIUM 10 MG: 10 TABLET, FILM COATED ORAL at 17:12

## 2019-05-15 RX ADMIN — CHOLECALCIFEROL (VITAMIN D3) 10 MCG (400 UNIT) TABLET 2000 UNITS: at 20:47

## 2019-05-15 RX ADMIN — Medication 10 ML: at 09:13

## 2019-05-15 RX ADMIN — CLOZAPINE 100 MG: 100 TABLET ORAL at 20:51

## 2019-05-15 RX ADMIN — BUDESONIDE 500 MCG: 0.5 SUSPENSION RESPIRATORY (INHALATION) at 08:10

## 2019-05-15 RX ADMIN — AMOXICILLIN AND CLAVULANATE POTASSIUM 1 TABLET: 875; 125 TABLET, FILM COATED ORAL at 20:47

## 2019-05-15 RX ADMIN — TIOTROPIUM BROMIDE 18 MCG: 18 CAPSULE ORAL; RESPIRATORY (INHALATION) at 08:12

## 2019-05-15 RX ADMIN — VALPROIC ACID 500 MG: 250 SOLUTION ORAL at 14:00

## 2019-05-15 RX ADMIN — ARIPIPRAZOLE 15 MG: 5 TABLET ORAL at 20:46

## 2019-05-15 RX ADMIN — AMOXICILLIN AND CLAVULANATE POTASSIUM 1 TABLET: 875; 125 TABLET, FILM COATED ORAL at 08:33

## 2019-05-15 RX ADMIN — ACETAMINOPHEN 650 MG: 325 TABLET ORAL at 06:44

## 2019-05-15 ASSESSMENT — PAIN DESCRIPTION - PAIN TYPE: TYPE: ACUTE PAIN

## 2019-05-15 ASSESSMENT — PAIN SCALES - GENERAL
PAINLEVEL_OUTOF10: 0
PAINLEVEL_OUTOF10: 0
PAINLEVEL_OUTOF10: 3
PAINLEVEL_OUTOF10: 3
PAINLEVEL_OUTOF10: 2

## 2019-05-15 ASSESSMENT — PAIN DESCRIPTION - FREQUENCY: FREQUENCY: CONTINUOUS

## 2019-05-15 ASSESSMENT — PAIN DESCRIPTION - DESCRIPTORS: DESCRIPTORS: ACHING

## 2019-05-15 ASSESSMENT — PAIN DESCRIPTION - LOCATION: LOCATION: LEG

## 2019-05-15 ASSESSMENT — PAIN DESCRIPTION - ONSET: ONSET: ON-GOING

## 2019-05-15 ASSESSMENT — PAIN DESCRIPTION - PROGRESSION: CLINICAL_PROGRESSION: GRADUALLY WORSENING

## 2019-05-15 ASSESSMENT — PAIN - FUNCTIONAL ASSESSMENT: PAIN_FUNCTIONAL_ASSESSMENT: PREVENTS OR INTERFERES SOME ACTIVE ACTIVITIES AND ADLS

## 2019-05-15 ASSESSMENT — PAIN DESCRIPTION - ORIENTATION: ORIENTATION: RIGHT;LEFT

## 2019-05-15 NOTE — PLAN OF CARE
Problem: Falls - Risk of:  Goal: Will remain free from falls  Description  Will remain free from falls  Outcome: Ongoing  Note:   Patient will remain free from falls. Bed will remain in lowest position with wheels locked and bed alarm engaged. Call light and tray table will remain within reach. Problem: Pain:  Goal: Pain level will decrease  Description  Pain level will decrease  Outcome: Ongoing  Note:   Patient's pain will continue to decrease and be managed per the [facility] Problem: Risk for Impaired Skin Integrity  Goal: Tissue integrity - skin and mucous membranes  Description  Structural intactness and normal physiological function of skin and  mucous membranes. Outcome: Ongoing  Note:   Patient's skin will remain intact with no further breakdown. Patient will be repositioned to relieve pressure on her buttocks and hips.

## 2019-05-15 NOTE — PROGRESS NOTES
Patient up to chair with jaqueline steady. Patient tolerated well. Patient had very large BM this morning. BLE cleaned well and lotion applied. Patient asking about discharge. Will continue to monitor.

## 2019-05-15 NOTE — CARE COORDINATION
Chi Fulton [phone] and I keep missing each other and leaving messages but we were finally able to talk and she is fine with the patient going to a facility but the patient needs to remain in [facility]. I told her that [facility] might be able to accept but that I asked Jeri Blevins with [facility] about their facilities since [facility] was not able to accept. Peri is checking to see if they can accept. Will send referral to [facility] as well just in case [facility] is unable to accept.      Electronically signed by Josseline Minor RN on 5/15/2019 at 10:53 AM  [phone]

## 2019-05-15 NOTE — PROGRESS NOTES
Patient's VSS and neuro are at her baseline. Monitor in patient's room was having difficulty detecting her blood pressure. Used the manual blood pressure and BP was WDL. Will continue to monitor.

## 2019-05-15 NOTE — PLAN OF CARE
Problem: Falls - Risk of:  Goal: Will remain free from falls  Description  Will remain free from falls  5/15/2019 0856 by Nahomy Arellano RN  Outcome: Ongoing  Note:   Patient has remained free from falls. Bed is low and locked, bed alarm on, side rails up 2/4, non skid socks on patient. Call light and bedside table are within reach. Patient calls out appropriately. Will continue to monitor. Problem: Pain:  Goal: Pain level will decrease  Description  Pain level will decrease  5/15/2019 0856 by Nahomy Arellano RN  Outcome: Ongoing  Note:   Patient denies pain at this time. Will continue to monitor.

## 2019-05-15 NOTE — PROGRESS NOTES
Hospitalist Progress Note    PCP: GIDEON BETTS, APRN - CNP    Date of Admission: 5/12/2019  Hospital Day: 2      Chief Complaint:   Chief Complaint   Patient presents with    Fall    Knee Pain           Hospital Course:   Admitted for elevated INR and drowsiness       Subjective:   Patient seen and examined  Drowsiness has resolved  Pt does not remember how long she has been on coumadin, or why  Denies any other complaints    Ancillary notes reviewed    Medications:  Reviewed    Infusion Medications    dextrose       Scheduled Medications    ferrous sulfate  325 mg Oral Lunch    enoxaparin  40 mg Subcutaneous Nightly    amoxicillin-clavulanate  1 tablet Oral 2 times per day    insulin lispro  0-12 Units Subcutaneous TID WC    insulin lispro  0-6 Units Subcutaneous Nightly    ARIPiprazole  15 mg Oral Nightly    atorvastatin  10 mg Oral QPM    furosemide  20 mg Oral BID    pantoprazole  40 mg Oral QAM AC    spironolactone  25 mg Oral Daily    tiotropium  18 mcg Inhalation Daily    valproate  500 mg Oral TID    vitamin B-12  1,000 mcg Oral Daily    vitamin D3  2,000 Units Oral Nightly    sodium chloride flush  10 mL Intravenous 2 times per day    cloZAPine  100 mg Oral Nightly    lisinopril  2.5 mg Oral Daily    levothyroxine  25 mcg Oral Daily    budesonide  0.5 mg Nebulization BID    formoterol  20 mcg Nebulization BID     PRN Meds: glucose, dextrose, glucagon (rDNA), dextrose, acetaminophen, traMADol **OR** traMADol, sodium chloride flush, magnesium hydroxide, ondansetron      Intake/Output Summary (Last 24 hours) at 5/15/2019 1352  Last data filed at 5/15/2019 1325  Gross per 24 hour   Intake 1180 ml   Output 1600 ml   Net -420 ml       Exam:    /60   Pulse 86   Temp 98 °F (36.7 °C) (Oral)   Resp 16   Ht 5' 6\" (1.676 m)   Wt 208 lb (94.3 kg)   SpO2 97%   BMI 33.57 kg/m²       General appearance: No apparent distress, appears stated age and cooperative.   HEENT: Pupils equal, round, and reactive to light. Conjunctivae/corneas clear. Neck: Supple, with full range of motion. No jugular venous distention. Trachea midline. Respiratory:  Normal respiratory effort. Clear to auscultation, bilaterally without Rales/Wheezes/Rhonchi. Cardiovascular: Regular rate and rhythm with normal S1/S2 without murmurs, rubs or gallops. Abdomen: Soft, non-tender, non-distended with normal bowel sounds. Musculoskeletal: 3+ pitting edema bilaterally with chronic stasis changes  Skin: Skin color, texture, turgor normal.  No rashes or lesions. Neurologic:  Neurovascularly intact without any focal sensory/motor deficits. Cranial nerves: II-XII intact, grossly non-focal.  Psychiatric: Alert and oriented, thought content appropriate, normal insight  Capillary Refill: Brisk,< 3 seconds   Peripheral Pulses: +2 palpable, equal bilaterally       Labs:   No results for input(s): WBC, HGB, HCT, PLT in the last 72 hours. No results for input(s): NA, K, CL, CO2, BUN, CREATININE, CALCIUM, PHOS in the last 72 hours. Invalid input(s): MAGNES  No results for input(s): AST, ALT, BILIDIR, BILITOT, ALKPHOS in the last 72 hours. Recent Labs     05/13/19  0527 05/14/19  0539 05/15/19  0522   INR 12.13* 1.49* 1.25*     No results for input(s): CKTOTAL, TROPONINI in the last 72 hours. Urinalysis:      Lab Results   Component Value Date    NITRU POSITIVE 05/12/2019    WBCUA 6-10 05/12/2019    BACTERIA 4+ 05/12/2019    RBCUA 0-2 05/12/2019    BLOODU Negative 05/12/2019    SPECGRAV 1.020 05/12/2019    GLUCOSEU Negative 05/12/2019       Radiology:  CT Head WO Contrast   Final Result      No acute intracranial hemorrhage or mass effect. Small scalp hematoma is noted. XR TIBIA FIBULA LEFT (2 VIEWS)   Final Result      No acute abnormality in the left femur, left knee, left tibia/fibula, right femur, right knee, or right tibia/fibula.       XR TIBIA FIBULA RIGHT (2 VIEWS)   Final Result      No acute abnormality in the left femur, left knee, left tibia/fibula, right femur, right knee, or right tibia/fibula. XR KNEE RIGHT (1-2 VIEWS)   Final Result      No acute abnormality in the left femur, left knee, left tibia/fibula, right femur, right knee, or right tibia/fibula. XR KNEE LEFT (1-2 VIEWS)   Final Result      No acute abnormality in the left femur, left knee, left tibia/fibula, right femur, right knee, or right tibia/fibula. XR FEMUR RIGHT (MIN 2 VIEWS)   Final Result      No acute abnormality in the left femur, left knee, left tibia/fibula, right femur, right knee, or right tibia/fibula. XR FEMUR LEFT (MIN 2 VIEWS)   Final Result      No acute abnormality in the left femur, left knee, left tibia/fibula, right femur, right knee, or right tibia/fibula. Assessment/Plan:    Active Hospital Problems    Diagnosis    Frequent falls [R29.6]    Diabetes mellitus due to underlying condition with hyperglycemia ([facility] [E08.65]    Schizophrenia ([facility] [F20.9]    Fall [W19. XXXA]    UTI (urinary tract infection) [N39.0]       Acute metabolic encephalopathy seems to have resolved  Unclear cause of elevated INR, however resolved now  Discussed with patient that with frequent falls, risk of bleeding is much higher. Extensive chart review done to understand indication for coumadin. Appears she was started on coumadin for a TIA at a nursing facility in 2014, and then never stopped. Her cardiologist's notes do not mention any arrhythmia. Will stop coumadin for now, and instruct her to follow up with cardiologist in 7-10 days post discharge.  If she needs anticoagulation for arrhythmia or recurrent DVT/PE and benefits outweigh risk, maybe DOAC would be a better option  transitioned to PO ABx for UTI  Add SSI, hypoglycemia protocol      DVT Prophylaxis: lovenox  Diet: DIET GENERAL;  Code Status: Full Code    PT/OT Eval Status: ordered    Dispo - inpatient, ready for discharge pending placement    Jose L Aguila MD  1:52 PM 5/15/2019

## 2019-05-15 NOTE — CARE COORDINATION
5/15/2019  [facility])  Clinical Case Management Department    Spoke with patient regarding discharge plans. Pt was agreeable to go to [facility] upon discharge. She will need transport to the facility upon discharge. We are hoping to get precert by tomorrow. Patient: Víctor Atkins  MRN: [id number] / : 1955  ACCT: [de-identified]          Admission Documentation  Attending Provider: Sarai Eisenberg MD  Admit date/time: 2019 10:59 AM  Status: Inpatient [101]  Diagnosis: Elevated INR     Readmission within last 30 days:  no     Living Situation       Service Assessment            Advance Directives (For Healthcare)  Pre-existing DNR Comfort Care/DNR Arrest/DNI Order: No                        Destination  skilled nursing facility    Durable Medical Equipment   deferred    Home Health/Skilled Nursing  Services at Discharge: SNF Physical Therapy, Occupational Therapy and Nursing daily  Home care at home?  No Provider: GENNARO Provider Phone: [address]  NA   Pharmacy: [address]  Potential Assistance Purchasing Medications:     Does patient want to participate in local refill/meds to beds program?:      Goals of Care     Patient plans for SNF: [facility]         Mode of transport from hospital: medical transport    Factors facilitating achievement of predicted outcomes: Cooperative and Pleasant    Barriers to discharge: waiting for precert     Josseline Mnior RN  [facility]  Case Management Department  IP:[phone] Fax: [phone]

## 2019-05-16 VITALS
HEIGHT: 66 IN | RESPIRATION RATE: 16 BRPM | WEIGHT: 208 LBS | BODY MASS INDEX: 33.43 KG/M2 | OXYGEN SATURATION: 93 % | TEMPERATURE: 98.4 F | HEART RATE: 94 BPM | DIASTOLIC BLOOD PRESSURE: 50 MMHG | SYSTOLIC BLOOD PRESSURE: 90 MMHG

## 2019-05-16 LAB
GLUCOSE BLD-MCNC: 264 MG/DL (ref 70–99)
GLUCOSE BLD-MCNC: 298 MG/DL (ref 70–99)
INR BLD: 1.23 (ref 0.86–1.14)
PERFORMED ON: ABNORMAL
PERFORMED ON: ABNORMAL
PROTHROMBIN TIME: 14 SEC (ref 9.8–13)

## 2019-05-16 PROCEDURE — 85610 PROTHROMBIN TIME: CPT

## 2019-05-16 PROCEDURE — 94640 AIRWAY INHALATION TREATMENT: CPT

## 2019-05-16 PROCEDURE — 6360000002 HC RX W HCPCS: Performed by: INTERNAL MEDICINE

## 2019-05-16 PROCEDURE — 94664 DEMO&/EVAL PT USE INHALER: CPT

## 2019-05-16 PROCEDURE — 6370000000 HC RX 637 (ALT 250 FOR IP): Performed by: INTERNAL MEDICINE

## 2019-05-16 PROCEDURE — 36415 COLL VENOUS BLD VENIPUNCTURE: CPT

## 2019-05-16 PROCEDURE — 94761 N-INVAS EAR/PLS OXIMETRY MLT: CPT

## 2019-05-16 PROCEDURE — 92526 ORAL FUNCTION THERAPY: CPT

## 2019-05-16 RX ORDER — GLIPIZIDE 5 MG/1
5 TABLET, FILM COATED, EXTENDED RELEASE ORAL DAILY
Qty: 60 TABLET | Refills: 2 | Status: ON HOLD | OUTPATIENT
Start: 2019-05-16 | End: 2019-09-18

## 2019-05-16 RX ADMIN — AMOXICILLIN AND CLAVULANATE POTASSIUM 1 TABLET: 875; 125 TABLET, FILM COATED ORAL at 10:01

## 2019-05-16 RX ADMIN — VALPROIC ACID 500 MG: 250 SOLUTION ORAL at 14:21

## 2019-05-16 RX ADMIN — PANTOPRAZOLE SODIUM 40 MG: 40 TABLET, DELAYED RELEASE ORAL at 07:21

## 2019-05-16 RX ADMIN — FERROUS SULFATE TAB 325 MG (65 MG ELEMENTAL FE) 325 MG: 325 (65 FE) TAB at 12:21

## 2019-05-16 RX ADMIN — VALPROIC ACID 500 MG: 250 SOLUTION ORAL at 10:00

## 2019-05-16 RX ADMIN — TIOTROPIUM BROMIDE 18 MCG: 18 CAPSULE ORAL; RESPIRATORY (INHALATION) at 09:34

## 2019-05-16 RX ADMIN — SPIRONOLACTONE 25 MG: 25 TABLET ORAL at 10:00

## 2019-05-16 RX ADMIN — TRAMADOL HYDROCHLORIDE 100 MG: 50 TABLET, FILM COATED ORAL at 04:29

## 2019-05-16 RX ADMIN — INSULIN LISPRO 6 UNITS: 100 INJECTION, SOLUTION INTRAVENOUS; SUBCUTANEOUS at 12:09

## 2019-05-16 RX ADMIN — CYANOCOBALAMIN TAB 1000 MCG 1000 MCG: 1000 TAB at 10:01

## 2019-05-16 RX ADMIN — FORMOTEROL FUMARATE DIHYDRATE 20 MCG: 20 SOLUTION RESPIRATORY (INHALATION) at 09:32

## 2019-05-16 RX ADMIN — BUDESONIDE 500 MCG: 0.5 SUSPENSION RESPIRATORY (INHALATION) at 09:32

## 2019-05-16 RX ADMIN — LEVOTHYROXINE SODIUM 25 MCG: 25 TABLET ORAL at 07:21

## 2019-05-16 RX ADMIN — INSULIN LISPRO 6 UNITS: 100 INJECTION, SOLUTION INTRAVENOUS; SUBCUTANEOUS at 08:32

## 2019-05-16 RX ADMIN — FUROSEMIDE 20 MG: 20 TABLET ORAL at 10:01

## 2019-05-16 ASSESSMENT — PAIN SCALES - GENERAL
PAINLEVEL_OUTOF10: 0
PAINLEVEL_OUTOF10: 10

## 2019-05-16 NOTE — PROGRESS NOTES
Hospitalist Progress Note    PCP: GIDEON BETTS, APRN - CNP    Date of Admission: 5/12/2019  Hospital Day: 3      Chief Complaint:   Chief Complaint   Patient presents with    Fall    Knee Pain           Hospital Course:   Admitted for elevated INR and drowsiness       Subjective:   Patient seen and examined  Drowsiness has resolved  Denies any other complaints    Ancillary notes reviewed    Medications:  Reviewed    Infusion Medications    dextrose       Scheduled Medications    ferrous sulfate  325 mg Oral Lunch    enoxaparin  40 mg Subcutaneous Nightly    amoxicillin-clavulanate  1 tablet Oral 2 times per day    insulin lispro  0-12 Units Subcutaneous TID WC    insulin lispro  0-6 Units Subcutaneous Nightly    ARIPiprazole  15 mg Oral Nightly    atorvastatin  10 mg Oral QPM    furosemide  20 mg Oral BID    pantoprazole  40 mg Oral QAM AC    spironolactone  25 mg Oral Daily    tiotropium  18 mcg Inhalation Daily    valproate  500 mg Oral TID    vitamin B-12  1,000 mcg Oral Daily    vitamin D3  2,000 Units Oral Nightly    sodium chloride flush  10 mL Intravenous 2 times per day    cloZAPine  100 mg Oral Nightly    lisinopril  2.5 mg Oral Daily    levothyroxine  25 mcg Oral Daily    budesonide  0.5 mg Nebulization BID    formoterol  20 mcg Nebulization BID     PRN Meds: glucose, dextrose, glucagon (rDNA), dextrose, acetaminophen, traMADol **OR** traMADol, sodium chloride flush, magnesium hydroxide, ondansetron      Intake/Output Summary (Last 24 hours) at 5/16/2019 1239  Last data filed at 5/16/2019 0909  Gross per 24 hour   Intake 1320 ml   Output --   Net 1320 ml       Exam:    BP (!) 94/36   Pulse 94   Temp 98.4 °F (36.9 °C) (Oral)   Resp 16   Ht 5' 6\" (1.676 m)   Wt 208 lb (94.3 kg)   SpO2 93%   BMI 33.57 kg/m²       General appearance: No apparent distress, appears stated age and cooperative. HEENT: Pupils equal, round, and reactive to light.  Conjunctivae/corneas clear.  Neck: Supple, with full range of motion. No jugular venous distention. Trachea midline. Respiratory:  Normal respiratory effort. Clear to auscultation, bilaterally without Rales/Wheezes/Rhonchi. Cardiovascular: Regular rate and rhythm with normal S1/S2 without murmurs, rubs or gallops. Abdomen: Soft, non-tender, non-distended with normal bowel sounds. Musculoskeletal: 3+ pitting edema bilaterally with chronic stasis changes  Skin: Skin color, texture, turgor normal.  No rashes or lesions. Neurologic:  Neurovascularly intact without any focal sensory/motor deficits. Cranial nerves: II-XII intact, grossly non-focal.  Psychiatric: Alert and oriented, thought content appropriate, normal insight  Capillary Refill: Brisk,< 3 seconds   Peripheral Pulses: +2 palpable, equal bilaterally       Labs:   No results for input(s): WBC, HGB, HCT, PLT in the last 72 hours. No results for input(s): NA, K, CL, CO2, BUN, CREATININE, CALCIUM, PHOS in the last 72 hours. Invalid input(s): MAGNES  No results for input(s): AST, ALT, BILIDIR, BILITOT, ALKPHOS in the last 72 hours. Recent Labs     05/14/19  0539 05/15/19  0522 05/16/19  0535   INR 1.49* 1.25* 1.23*     No results for input(s): CKTOTAL, TROPONINI in the last 72 hours. Urinalysis:      Lab Results   Component Value Date    NITRU POSITIVE 05/12/2019    WBCUA 6-10 05/12/2019    BACTERIA 4+ 05/12/2019    RBCUA 0-2 05/12/2019    BLOODU Negative 05/12/2019    SPECGRAV 1.020 05/12/2019    GLUCOSEU Negative 05/12/2019       Radiology:  CT Head WO Contrast   Final Result      No acute intracranial hemorrhage or mass effect. Small scalp hematoma is noted. XR TIBIA FIBULA LEFT (2 VIEWS)   Final Result      No acute abnormality in the left femur, left knee, left tibia/fibula, right femur, right knee, or right tibia/fibula.       XR TIBIA FIBULA RIGHT (2 VIEWS)   Final Result      No acute abnormality in the left femur, left knee, left tibia/fibula, right

## 2019-05-16 NOTE — PROGRESS NOTES
Pt D/[place] per stretcher/ambulance to [facility] SNF. Orders and report sent with pt. Condition good and goals met at D/C.

## 2019-05-16 NOTE — CARE COORDINATION
Case Management Discharge Assessment    5/16/2019  [facility])  Clinical Case Management Department    Called pt's , Robert Yost to let her know of transport time. Patient: Adan iPttman  MRN: [id number] / Fadumo Sultana: 1955  ACCT: [de-identified]          Admission Documentation  Attending Provider: Lissette Dickens MD  Admit date/time: 5/12/2019 10:59 AM  Status: Inpatient [101]  Diagnosis: Elevated INR     Readmission within last 30 days:  no     Living Situation       Service Assessment:            Advance Directives (For Healthcare)  Pre-existing DNR Comfort Care/DNR Arrest/DNI Order: No                        Pharmacy: [facility] Medications:     Does patient want to participate in local refill/meds to beds program?:      Notification completed in HENS/PAS? Yes     IMM  No       Discharge disposition: SNF:[facility] Phone: [phone] Fax: [phone]     [facility]  Name of [address] [place] [address]  [facility] [phone]  Fax of Facility [phone]    Mode of Transport medical transport  Name of [address]   Number of Transport [phone]  Time of Transport [address] and her family were provided with choice of provider; she and her family are in agreement with the discharge plan.       Care Transitions patient: No    Autumn Rivers RN  [facility]  Case Management Department  Ph: [phone] Fax: [phone]

## 2019-05-16 NOTE — CARE COORDINATION
Spoke with Eduardo Ramirez in admissions with [facility] this am to check on precert status. She believes we will get precert today. YOMAIRA submitted and arranged for transport for [id number] pending precert.      Electronically signed by Jessica Lara RN on 5/16/2019 at 10:08 AM  [phone]

## 2019-05-16 NOTE — PROGRESS NOTES
Speech Language Pathology  Facility/Department: [facility] 5T ORTHO/NEURO  Dysphagia Daily Treatment Note/Discharge    NAME: Mercedes Luna  : 1955  MRN: [id number]    Patient Diagnosis(es):   Patient Active Problem List    Diagnosis Date Noted    Frequent falls 2019    Diabetes mellitus due to underlying condition with hyperglycemia ([facility] 2019    Schizophrenia ([facility] 2019    Fall 2019    UTI (urinary tract infection) 2019    Colitis 2016    Abdominal pain 2016    COPD (chronic obstructive pulmonary disease) ([facility] 2013     Allergies: No Known Allergies     Recent Chest Xray: (19)  Impression       No consolidation        Previous MBS  n/a    Chart reviewed. Medical Diagnosis: elevated INR  Treatment Diagnosis: dysphagia    BSE Impression 19  \"Prolonged mastication secondary to being edentulous. x1 coughing with thins via straw. Throat clearing noted x1 after PO trials. \"    MBS results  Not indicated    Pain: denied; pt uncomfortable in chair--repositioned pillows behind pt's back    Current Diet : regular texture diet/thin liquids    Treatment:  Pt seen bedside to address the following goals:  1. The patient will tolerate recommended diet without observed clinical signs of aspiration  : Pt resting in chair upon SLP entry but was easily awakened with verbal cues. Willing to accept small amount of regular texture jennifer crackers and thin liquids via cup. Pt with prolonged mastication due to edentulous state. Pt indicated she can chew almost anything despite not having teeth or wearing dentures. Pt with mild residue on body of tongue, which cleared with cues to use liquid rinse. One mild throat clear noted after eating snack, but not suspected related to aspiration. Vocal quality remained clear throughout session. No report of difficulty with any liquids/solids. Recommend continue current diet with standard aspiration precautions.  Goal MET.    Patient/Family/Caregiver Education:  Educated pt re: role of SLP, swallow strategies/precautions, diet consistencies, purpose of visit, positioning for PO intake, plan of care, s/s aspiration, and no recommended further dysphagia tx at this time. Pt verbalized understanding; suspect pt needs reinforcement for new learning    Compensatory Strategies:  Small bites/sips, Upright as possible for all oral intake       Plan:  Pt will be discharged from dysphagia services due to meeting goals and tolerating diet. Diet recommendations: continue regular texture diet/thin liquids  DC recommendation: no further dysphagia tx warranted at this time  Treatment: 10  D/W nursing, [facility]  Needs met prior to leaving room, call button in reach. MARZENA GonzalezS. [id number] [facility]  Speech-Language Pathologist    Pg.  # [phone]    If patient is discharged prior to next treatment, this note will serve as the discharge summary

## 2019-05-16 NOTE — PLAN OF CARE
Problem: Falls - Risk of:  Goal: Will remain free from falls  Outcome: Ongoing  Pt remains free from injury during this shift. Pt is an assist of 1-2 person with the jaqueline rothman. Encourage pt to call for all assistance. Call light is in reach, bed locked and in lowest position. Will continue to monitor and follow POC. Problem: Pain:  Goal: Pain level will decrease  Outcome: Ongoing  No complaints of pain at this time. Encourage pt to notify nursing when pain is noted. Will continue to monitor. Problem: Risk for Impaired Skin Integrity  Goal: Tissue integrity - skin and mucous membranes  Outcome: Ongoing  Pt has redness to the bilateral lower extremities with flaking skin. Lotion applied per pt request. Pt has bruising to the buttocks/lower back. Will continue to monitor.

## 2019-05-16 NOTE — PLAN OF CARE
Problem: Falls - Risk of:  Goal: Will remain free from falls  Description  Will remain free from falls  Note:   Patient is high fall risk. Fall risk protocol in place. Bed alarm set. Patient reminded to call for assistance, call light within reach. Patient remains free of injury. Will continue to monitor.

## 2019-05-17 NOTE — DISCHARGE SUMMARY
Hospital Medicine Discharge Summary    Patient ID: Keagan Kennedy      Patient's PCP: Anila Stapleton APRN - CNP    Admit Date: 5/12/2019     Discharge Date: 5/16/2019    Admitting Physician: Xiomy Mayberry MD     Discharge Physician: Xiomy Mayberry MD     Discharge Diagnoses:    Principal Problem (Resolved):    Elevated INR  Active Problems:    Schizophrenia [facility])    Fall    UTI (urinary tract infection)    Frequent falls    Diabetes mellitus due to underlying condition with hyperglycemia ([address]  Resolved Problems:    Acute metabolic encephalopathy        The patient was seen and examined on day of discharge and this discharge summary is in conjunction with any daily progress note from day of discharge. Hospital Course:     Keagan Kennedy is a 66 y.o. female who was admitted after a fall at her nursing facility and elevated INR to 12.5 due to coumadin use. She was also noted to be acutely encephalopathic, CT head was negative for acute bleed, and rest of skeletal survey was negative for fractures. Acute encephalopathy was attributed to UTI which was treated initially with IV antibiotics and then transitioned to oral antibiotics after culture data was available. She was taken off of coumadin as no clear indication could be found except for a possible TIA in 2014. She was instructed to follow up with her cardiologist for a risk vs benefit discussion in light of her recent frequent falls and to resume anticoagulation if recommended by her cardiologist. She had uncontrolled diabetes during her stay which was managed with SSI and frequent blood sugar checks. She was transitioned to glipizide instead of glimepiride on discharge.  She was sent to SNF for PT, OT in stable condition          Exam:     BP (!) 90/50 Comment: manual  Pulse 94   Temp 98.4 °F (36.9 °C) (Oral)   Resp 16   Ht 5' 6\" (1.676 m)   Wt 208 lb (94.3 kg)   SpO2 93%   BMI 33.57 kg/m²       General appearance:  No apparent distress, appears stated age and cooperative. HEENT:  Normal cephalic, atraumatic without obvious deformity. Pupils equal, round, and reactive to light. Extra ocular muscles intact. Conjunctivae/corneas clear. Neck: Supple, with full range of motion. No jugular venous distention. Trachea midline. Respiratory:  Normal respiratory effort. Clear to auscultation, bilaterally without Rales/Wheezes/Rhonchi. Cardiovascular:  Regular rate and rhythm with normal S1/S2 without murmurs, rubs or gallops. Abdomen: Soft, non-tender, non-distended with normal bowel sounds. Musculoskeletal:  No clubbing, cyanosis or edema bilaterally. Full range of motion without deformity. Skin: Skin color, texture, turgor normal.  No rashes or lesions. Neurologic:  Neurovascularly intact without any focal sensory/motor deficits. Cranial nerves: II-XII intact, grossly non-focal.  Psychiatric:  Alert and oriented, thought content appropriate, normal insight  Capillary Refill: Brisk,< 3 seconds   Peripheral Pulses: +2 palpable, equal bilaterally       Labs: For convenience and continuity at follow-up the following most recent labs are provided:      CBC:    Lab Results   Component Value Date    WBC 6.8 05/12/2019    HGB 11.3 05/12/2019    HCT 35.0 05/12/2019     05/12/2019       Renal:    Lab Results   Component Value Date     05/12/2019    K 4.5 05/12/2019    CL 95 05/12/2019    CO2 27 05/12/2019    BUN 23 05/12/2019    CREATININE 0.8 05/12/2019    CALCIUM 9.5 05/12/2019    PHOS 2.2 08/15/2017         Significant Diagnostic Studies    Radiology:   CT Head WO Contrast   Final Result      No acute intracranial hemorrhage or mass effect. Small scalp hematoma is noted. XR TIBIA FIBULA LEFT (2 VIEWS)   Final Result      No acute abnormality in the left femur, left knee, left tibia/fibula, right femur, right knee, or right tibia/fibula.       XR TIBIA FIBULA RIGHT (2 VIEWS)   Final Result      No acute abnormality in the left femur, left knee, left tibia/fibula, right femur, right knee, or right tibia/fibula. XR KNEE RIGHT (1-2 VIEWS)   Final Result      No acute abnormality in the left femur, left knee, left tibia/fibula, right femur, right knee, or right tibia/fibula. XR KNEE LEFT (1-2 VIEWS)   Final Result      No acute abnormality in the left femur, left knee, left tibia/fibula, right femur, right knee, or right tibia/fibula. XR FEMUR RIGHT (MIN 2 VIEWS)   Final Result      No acute abnormality in the left femur, left knee, left tibia/fibula, right femur, right knee, or right tibia/fibula. XR FEMUR LEFT (MIN 2 VIEWS)   Final Result      No acute abnormality in the left femur, left knee, left tibia/fibula, right femur, right knee, or right tibia/fibula.              Consults:     IP CONSULT TO HOSPITALIST  IP CONSULT TO PHARMACY    Disposition:  SNF     Condition at Discharge: Stable    Discharge Instructions/Follow-up:      JC Jamiosn CNP  [facility] [id number]  [address] [place] [address]  [phone]    In 1 week  for recommendations on coumadin with history of frequent falls      Code Status:  Prior     Activity: activity as tolerated    Diet: regular diet      Discharge Medications:     Discharge Medication List as of 5/16/2019  2:51 PM           Details   ciprofloxacin (CIPRO) 250 MG tablet Take 1 tablet by mouth 2 times daily for 3 days, Disp-6 tablet, R-0Normal              Details   glipiZIDE (GLUCOTROL XL) 5 MG extended release tablet Take 1 tablet by mouth daily, Disp-60 tablet, R-2Normal              Details   ARIPiprazole (ABILIFY) 15 MG tablet Take 15 mg by mouth nightly Historical Med      cholestyramine light 4 g packet Take 4 g by mouth dailyHistorical Med      Dulaglutide (TRULICITY) 1.5 SG/4.6ZL SOPN Inject 1 Syringe into the skin every 7 days TuesdaysHistorical Med      diphenhydrAMINE (BENADRYL) 25 MG capsule Take 25 mg by mouth every 6 hours as needed for ItchingHistorical Med      levothyroxine (SYNTHROID) 25 MCG tablet Take 25 mcg by mouth DailyHistorical Med      tiotropium (SPIRIVA RESPIMAT) 2.5 MCG/ACT AERS inhaler Inhale 2 puffs into the lungs daily, Disp-1 Inhaler, R-11Normal      Fluticasone Furoate-Vilanterol 200-25 MCG/INH AEPB Inhale 1 puff into the lungs daily, Disp-1 each, R-11Normal      atorvastatin (LIPITOR) 10 MG tablet Take 10 mg by mouth nightly Historical Med      furosemide (LASIX) 20 MG tablet Take 20 mg by mouth 2 times dailyHistorical Med      lisinopril (PRINIVIL;ZESTRIL) 2.5 MG tablet Take 2.5 mg by mouth daily Historical Med      ferrous sulfate 325 (65 Fe) MG tablet Take 325 mg by mouth daily (with breakfast)Historical Med      vitamin B-12 (CYANOCOBALAMIN) 1000 MCG tablet Take 1,000 mcg by mouth every evening Historical Med      spironolactone (ALDACTONE) 25 MG tablet Take 1 tablet by mouth daily, Disp-30 tablet, R-3      cloZAPine (CLOZARIL) 100 MG tablet Take 100 mg by mouth nightly Historical Med      valproic acid (DEPAKENE) 250 MG/5ML SYRP Take 500 mg by mouth 3 times daily Historical Med      Cholecalciferol (VITAMIN D) 2000 units TABS tablet Take 2,000 Units by mouth nightly Historical Med      therapeutic multivitamin-minerals (THERAGRAN-M) tablet Take 1 tablet by mouth daily. Time Spent on discharge is more than 45 minutes in the examination, evaluation, counseling and review of medications and discharge plan. Signed:    Carlito Garcia MD   5/17/2019      Thank you JC JORDAN - CNP for the opportunity to be involved in this patient's care. If you have any questions or concerns please feel free to contact me at [phone].
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
## 2019-06-11 PROBLEM — N39.0 UTI (URINARY TRACT INFECTION): Status: RESOLVED | Noted: 2019-05-12 | Resolved: 2019-06-11

## 2019-08-12 ENCOUNTER — HOSPITAL ENCOUNTER (EMERGENCY)
Age: 64
Discharge: HOME | End: 2019-08-12
Attending: EMERGENCY MEDICINE
Payer: COMMERCIAL

## 2019-08-12 VITALS
OXYGEN SATURATION: 98 % | SYSTOLIC BLOOD PRESSURE: 98 MMHG | HEART RATE: 77 BPM | DIASTOLIC BLOOD PRESSURE: 44 MMHG | RESPIRATION RATE: 16 BRPM

## 2019-08-12 DIAGNOSIS — N12 PYELONEPHRITIS: Primary | ICD-10-CM

## 2019-08-12 DIAGNOSIS — R79.1 ELEVATED INR: ICD-10-CM

## 2019-08-12 LAB
ANION GAP SERPL CALCULATED.3IONS-SCNC: 16 MMOL/L (ref 3–16)
BACTERIA: ABNORMAL /HPF
BASOPHILS ABSOLUTE: 0 K/UL (ref 0–0.2)
BASOPHILS RELATIVE PERCENT: 0.4 %
BILIRUBIN URINE: NEGATIVE
BLOOD, URINE: ABNORMAL
BUN BLDV-MCNC: 20 MG/DL (ref 7–20)
CALCIUM SERPL-MCNC: 9.2 MG/DL (ref 8.3–10.6)
CHLORIDE BLD-SCNC: 94 MMOL/L (ref 99–110)
CLARITY: CLEAR
CO2: 27 MMOL/L (ref 21–32)
COLOR: YELLOW
CREAT SERPL-MCNC: 0.8 MG/DL (ref 0.6–1.2)
EKG ATRIAL RATE: 92 BPM
EKG DIAGNOSIS: NORMAL
EKG P AXIS: 44 DEGREES
EKG P-R INTERVAL: 136 MS
EKG Q-T INTERVAL: 386 MS
EKG QRS DURATION: 90 MS
EKG QTC CALCULATION (BAZETT): 477 MS
EKG R AXIS: -63 DEGREES
EKG T AXIS: 55 DEGREES
EKG VENTRICULAR RATE: 92 BPM
EOSINOPHILS ABSOLUTE: 0.1 K/UL (ref 0–0.6)
EOSINOPHILS RELATIVE PERCENT: 1.1 %
EPITHELIAL CELLS, UA: ABNORMAL /HPF
GFR AFRICAN AMERICAN: >60
GFR NON-AFRICAN AMERICAN: >60
GLUCOSE BLD-MCNC: 253 MG/DL (ref 70–99)
GLUCOSE BLD-MCNC: 313 MG/DL (ref 70–99)
GLUCOSE BLD-MCNC: 325 MG/DL (ref 70–99)
GLUCOSE URINE: 500 MG/DL
HCT VFR BLD CALC: 37.5 % (ref 36–48)
HEMOGLOBIN: 12.1 G/DL (ref 12–16)
INR BLD: 3.61 (ref 0.86–1.14)
KETONES, URINE: NEGATIVE MG/DL
LEUKOCYTE ESTERASE, URINE: ABNORMAL
LYMPHOCYTES ABSOLUTE: 1.7 K/UL (ref 1–5.1)
LYMPHOCYTES RELATIVE PERCENT: 22.1 %
MCH RBC QN AUTO: 29 PG (ref 26–34)
MCHC RBC AUTO-ENTMCNC: 32.3 G/DL (ref 31–36)
MCV RBC AUTO: 89.8 FL (ref 80–100)
MICROSCOPIC EXAMINATION: YES
MONOCYTES ABSOLUTE: 0.5 K/UL (ref 0–1.3)
MONOCYTES RELATIVE PERCENT: 6.5 %
NEUTROPHILS ABSOLUTE: 5.4 K/UL (ref 1.7–7.7)
NEUTROPHILS RELATIVE PERCENT: 69.9 %
NITRITE, URINE: POSITIVE
PDW BLD-RTO: 19.7 % (ref 12.4–15.4)
PERFORMED ON: ABNORMAL
PERFORMED ON: ABNORMAL
PH UA: 6 (ref 5–8)
PLATELET # BLD: 282 K/UL (ref 135–450)
PMV BLD AUTO: 8.6 FL (ref 5–10.5)
POTASSIUM REFLEX MAGNESIUM: 4.9 MMOL/L (ref 3.5–5.1)
PROTEIN UA: NEGATIVE MG/DL
PROTHROMBIN TIME: 41.1 SEC (ref 9.8–13)
RBC # BLD: 4.17 M/UL (ref 4–5.2)
RBC UA: ABNORMAL /HPF (ref 0–2)
SODIUM BLD-SCNC: 137 MMOL/L (ref 136–145)
SPECIFIC GRAVITY UA: 1.02 (ref 1–1.03)
URINE TYPE: ABNORMAL
UROBILINOGEN, URINE: 0.2 E.U./DL
WBC # BLD: 7.7 K/UL (ref 4–11)
WBC UA: ABNORMAL /HPF (ref 0–5)

## 2019-08-12 PROCEDURE — 36415 COLL VENOUS BLD VENIPUNCTURE: CPT

## 2019-08-12 PROCEDURE — 96365 THER/PROPH/DIAG IV INF INIT: CPT

## 2019-08-12 PROCEDURE — 96361 HYDRATE IV INFUSION ADD-ON: CPT

## 2019-08-12 PROCEDURE — 85610 PROTHROMBIN TIME: CPT

## 2019-08-12 PROCEDURE — 85025 COMPLETE CBC W/AUTO DIFF WBC: CPT

## 2019-08-12 PROCEDURE — 99285 EMERGENCY DEPT VISIT HI MDM: CPT

## 2019-08-12 PROCEDURE — 81001 URINALYSIS AUTO W/SCOPE: CPT

## 2019-08-12 PROCEDURE — 93005 ELECTROCARDIOGRAM TRACING: CPT | Performed by: EMERGENCY MEDICINE

## 2019-08-12 PROCEDURE — 87186 SC STD MICRODIL/AGAR DIL: CPT

## 2019-08-12 PROCEDURE — 2580000003 HC RX 258: Performed by: EMERGENCY MEDICINE

## 2019-08-12 PROCEDURE — 80048 BASIC METABOLIC PNL TOTAL CA: CPT

## 2019-08-12 PROCEDURE — 6360000002 HC RX W HCPCS: Performed by: EMERGENCY MEDICINE

## 2019-08-12 PROCEDURE — 87077 CULTURE AEROBIC IDENTIFY: CPT

## 2019-08-12 PROCEDURE — 87086 URINE CULTURE/COLONY COUNT: CPT

## 2019-08-12 RX ORDER — SODIUM CHLORIDE, SODIUM GLUCONATE, SODIUM ACETATE, POTASSIUM CHLORIDE AND MAGNESIUM CHLORIDE 526; 502; 368; 37; 30 MG/100ML; MG/100ML; MG/100ML; MG/100ML; MG/100ML
1000 INJECTION, SOLUTION INTRAVENOUS ONCE
Status: COMPLETED | OUTPATIENT
Start: 2019-08-12 | End: 2019-08-12

## 2019-08-12 RX ORDER — CEPHALEXIN 500 MG/1
500 CAPSULE ORAL 4 TIMES DAILY
Qty: 40 CAPSULE | Refills: 0 | Status: SHIPPED | OUTPATIENT
Start: 2019-08-12 | End: 2019-08-22

## 2019-08-12 RX ORDER — SODIUM CHLORIDE, SODIUM GLUCONATE, SODIUM ACETATE, POTASSIUM CHLORIDE AND MAGNESIUM CHLORIDE 526; 502; 368; 37; 30 MG/100ML; MG/100ML; MG/100ML; MG/100ML; MG/100ML
1000 INJECTION, SOLUTION INTRAVENOUS CONTINUOUS
Status: DISPENSED | OUTPATIENT
Start: 2019-08-12 | End: 2019-08-12

## 2019-08-12 RX ORDER — SODIUM CHLORIDE, SODIUM GLUCONATE, SODIUM ACETATE, POTASSIUM CHLORIDE AND MAGNESIUM CHLORIDE 526; 502; 368; 37; 30 MG/100ML; MG/100ML; MG/100ML; MG/100ML; MG/100ML
1000 INJECTION, SOLUTION INTRAVENOUS ONCE
Status: DISCONTINUED | OUTPATIENT
Start: 2019-08-12 | End: 2019-08-12

## 2019-08-12 RX ADMIN — CEFTRIAXONE 1 G: 1 INJECTION, POWDER, FOR SOLUTION INTRAMUSCULAR; INTRAVENOUS at 13:18

## 2019-08-12 RX ADMIN — SODIUM CHLORIDE, SODIUM GLUCONATE, SODIUM ACETATE, POTASSIUM CHLORIDE AND MAGNESIUM CHLORIDE 1000 ML: 526; 502; 368; 37; 30 INJECTION, SOLUTION INTRAVENOUS at 13:28

## 2019-08-12 RX ADMIN — SODIUM CHLORIDE, SODIUM GLUCONATE, SODIUM ACETATE, POTASSIUM CHLORIDE AND MAGNESIUM CHLORIDE 1000 ML: 526; 502; 368; 37; 30 INJECTION, SOLUTION INTRAVENOUS at 11:52

## 2019-08-12 ASSESSMENT — PAIN DESCRIPTION - PAIN TYPE: TYPE: ACUTE PAIN

## 2019-08-12 ASSESSMENT — PAIN DESCRIPTION - DESCRIPTORS: DESCRIPTORS: ACHING

## 2019-08-12 ASSESSMENT — PAIN DESCRIPTION - LOCATION: LOCATION: CHEST

## 2019-08-12 ASSESSMENT — PAIN SCALES - GENERAL: PAINLEVEL_OUTOF10: 8

## 2019-08-12 NOTE — ED NOTES
Karlo Talley called for patient to go back to group home via transport.  Pt states cannot walk and needs help getting back into group home     Elijio Rinne, [facility]  08/12/19 9780

## 2019-08-12 NOTE — ED PROVIDER NOTES
injection  HENT: atraumatic,MMM  CVS: RRR, strong radial pulse, no appreciable murmurs  Resp: no acute respiratory distress,equal chest rise  ABD: Soft, NTND, mild L CVA TTP  Extremities: no gross deformities, noedema  Skin: warm, dry, no visible rashes or lesions  Neuro: CN2-12grossly intact, strength and sensation grossly intact. Psych: AAOx4, appropriate mood andaffect.      Diagnostic Results     EKG     None    RADIOLOGY:  No orders to display       LABS:   Results for orders placed or performed during the hospital encounter of 08/12/19   CBC Auto Differential   Result Value Ref Range    WBC 7.7 4.0 - 11.0 K/uL    RBC 4.17 4.00 - 5.20 M/uL    Hemoglobin 12.1 12.0 - 16.0 g/dL    Hematocrit 37.5 36.0 - 48.0 %    MCV 89.8 80.0 - 100.0 fL    MCH 29.0 26.0 - 34.0 pg    MCHC 32.3 31.0 - 36.0 g/dL    RDW 19.7 (H) 12.4 - 15.4 %    Platelets 357 580 - 242 K/uL    MPV 8.6 5.0 - 10.5 fL    Neutrophils % 69.9 %    Lymphocytes % 22.1 %    Monocytes % 6.5 %    Eosinophils % 1.1 %    Basophils % 0.4 %    Neutrophils # 5.4 1.7 - 7.7 K/uL    Lymphocytes # 1.7 1.0 - 5.1 K/uL    Monocytes # 0.5 0.0 - 1.3 K/uL    Eosinophils # 0.1 0.0 - 0.6 K/uL    Basophils # 0.0 0.0 - 0.2 K/uL   Basic Metabolic Panel w/ Reflex to MG   Result Value Ref Range    Sodium 137 136 - 145 mmol/L    Potassium reflex Magnesium 4.9 3.5 - 5.1 mmol/L    Chloride 94 (L) 99 - 110 mmol/L    CO2 27 21 - 32 mmol/L    Anion Gap 16 3 - 16    Glucose 325 (H) 70 - 99 mg/dL    BUN 20 7 - 20 mg/dL    CREATININE 0.8 0.6 - 1.2 mg/dL    GFR Non-African American >60 >60    GFR African American >60 >60    Calcium 9.2 8.3 - 10.6 mg/dL   Protime-INR   Result Value Ref Range    Protime 41.1 (H) 9.8 - 13.0 sec    INR 3.61 (H) 0.86 - 1.14   Urinalysis, reflex to microscopic   Result Value Ref Range    Color, UA Yellow Straw/Yellow    Clarity, UA Clear Clear    Glucose, Ur 500 (A) Negative mg/dL    Bilirubin Urine Negative Negative    Ketones, Urine Negative Negative mg/dL

## 2019-08-14 LAB
ORGANISM: ABNORMAL
URINE CULTURE, ROUTINE: ABNORMAL

## 2019-09-17 ENCOUNTER — APPOINTMENT (OUTPATIENT)
Dept: GENERAL RADIOLOGY | Age: 64
DRG: 463 | End: 2019-09-17
Payer: COMMERCIAL

## 2019-09-17 ENCOUNTER — APPOINTMENT (OUTPATIENT)
Dept: CT IMAGING | Age: 64
DRG: 463 | End: 2019-09-17
Payer: COMMERCIAL

## 2019-09-17 ENCOUNTER — HOSPITAL ENCOUNTER (INPATIENT)
Age: 64
LOS: 2 days | Discharge: HOME | DRG: 463 | End: 2019-09-19
Attending: EMERGENCY MEDICINE | Admitting: STUDENT IN AN ORGANIZED HEALTH CARE EDUCATION/TRAINING PROGRAM
Payer: COMMERCIAL

## 2019-09-17 DIAGNOSIS — R41.82 ALTERED MENTAL STATUS, UNSPECIFIED ALTERED MENTAL STATUS TYPE: ICD-10-CM

## 2019-09-17 DIAGNOSIS — N39.0 URINARY TRACT INFECTION WITHOUT HEMATURIA, SITE UNSPECIFIED: Primary | ICD-10-CM

## 2019-09-17 PROBLEM — I95.9 HYPOTENSION: Status: ACTIVE | Noted: 2019-09-17

## 2019-09-17 LAB
ANION GAP SERPL CALCULATED.3IONS-SCNC: 13 MMOL/L (ref 3–16)
BACTERIA: ABNORMAL /HPF
BASE EXCESS VENOUS: 4 (ref -3–3)
BASOPHILS ABSOLUTE: 0 K/UL (ref 0–0.2)
BASOPHILS RELATIVE PERCENT: 0.2 %
BILIRUBIN URINE: NEGATIVE
BLOOD, URINE: NEGATIVE
BUN BLDV-MCNC: 16 MG/DL (ref 7–20)
CALCIUM SERPL-MCNC: 8.9 MG/DL (ref 8.3–10.6)
CASTS: ABNORMAL /LPF
CHLORIDE BLD-SCNC: 93 MMOL/L (ref 99–110)
CLARITY: CLEAR
CO2: 28 MMOL/L (ref 21–32)
COLOR: YELLOW
CREAT SERPL-MCNC: 0.8 MG/DL (ref 0.6–1.2)
EOSINOPHILS ABSOLUTE: 0 K/UL (ref 0–0.6)
EOSINOPHILS RELATIVE PERCENT: 0.3 %
EPITHELIAL CELLS, UA: ABNORMAL /HPF
GFR AFRICAN AMERICAN: >60
GFR NON-AFRICAN AMERICAN: >60
GLUCOSE BLD-MCNC: 196 MG/DL (ref 70–99)
GLUCOSE BLD-MCNC: 218 MG/DL (ref 70–99)
GLUCOSE URINE: NEGATIVE MG/DL
HCO3 VENOUS: 27.5 MMOL/L (ref 23–29)
HCT VFR BLD CALC: 32.3 % (ref 36–48)
HEMOGLOBIN: 10.8 G/DL (ref 12–16)
INR BLD: 1.68 (ref 0.86–1.14)
KETONES, URINE: NEGATIVE MG/DL
LACTATE: 1.54 MMOL/L (ref 0.4–2)
LEUKOCYTE ESTERASE, URINE: ABNORMAL
LYMPHOCYTES ABSOLUTE: 1.1 K/UL (ref 1–5.1)
LYMPHOCYTES RELATIVE PERCENT: 12.2 %
MCH RBC QN AUTO: 29.3 PG (ref 26–34)
MCHC RBC AUTO-ENTMCNC: 33.3 G/DL (ref 31–36)
MCV RBC AUTO: 87.8 FL (ref 80–100)
MICROSCOPIC EXAMINATION: YES
MONOCYTES ABSOLUTE: 0.5 K/UL (ref 0–1.3)
MONOCYTES RELATIVE PERCENT: 5.4 %
NEUTROPHILS ABSOLUTE: 7.4 K/UL (ref 1.7–7.7)
NEUTROPHILS RELATIVE PERCENT: 81.9 %
NITRITE, URINE: NEGATIVE
O2 SAT, VEN: 83 %
PCO2, VEN: 39 MM HG (ref 40–50)
PDW BLD-RTO: 17.9 % (ref 12.4–15.4)
PERFORMED ON: ABNORMAL
PERFORMED ON: ABNORMAL
PH UA: 6.5 (ref 5–8)
PH VENOUS: 7.46 (ref 7.35–7.45)
PLATELET # BLD: 246 K/UL (ref 135–450)
PMV BLD AUTO: 8.6 FL (ref 5–10.5)
PO2, VEN: 45 MM HG
POC SAMPLE TYPE: ABNORMAL
POTASSIUM REFLEX MAGNESIUM: 4 MMOL/L (ref 3.5–5.1)
PRO-BNP: 71 PG/ML (ref 0–124)
PROTEIN UA: NEGATIVE MG/DL
PROTHROMBIN TIME: 19.2 SEC (ref 9.8–13)
RBC # BLD: 3.68 M/UL (ref 4–5.2)
RBC UA: ABNORMAL /HPF (ref 0–2)
SODIUM BLD-SCNC: 134 MMOL/L (ref 136–145)
SPECIFIC GRAVITY UA: 1.01 (ref 1–1.03)
TCO2 CALC VENOUS: 29 MMOL/L
TROPONIN: <0.01 NG/ML
URINE TYPE: ABNORMAL
UROBILINOGEN, URINE: 0.2 E.U./DL
WBC # BLD: 9 K/UL (ref 4–11)
WBC UA: ABNORMAL /HPF (ref 0–5)

## 2019-09-17 PROCEDURE — 96365 THER/PROPH/DIAG IV INF INIT: CPT

## 2019-09-17 PROCEDURE — 85610 PROTHROMBIN TIME: CPT

## 2019-09-17 PROCEDURE — 83605 ASSAY OF LACTIC ACID: CPT

## 2019-09-17 PROCEDURE — 87086 URINE CULTURE/COLONY COUNT: CPT

## 2019-09-17 PROCEDURE — 99285 EMERGENCY DEPT VISIT HI MDM: CPT

## 2019-09-17 PROCEDURE — 36415 COLL VENOUS BLD VENIPUNCTURE: CPT

## 2019-09-17 PROCEDURE — 83880 ASSAY OF NATRIURETIC PEPTIDE: CPT

## 2019-09-17 PROCEDURE — 84484 ASSAY OF TROPONIN QUANT: CPT

## 2019-09-17 PROCEDURE — 80048 BASIC METABOLIC PNL TOTAL CA: CPT

## 2019-09-17 PROCEDURE — 1200000000 HC SEMI PRIVATE

## 2019-09-17 PROCEDURE — 71045 X-RAY EXAM CHEST 1 VIEW: CPT

## 2019-09-17 PROCEDURE — 87040 BLOOD CULTURE FOR BACTERIA: CPT

## 2019-09-17 PROCEDURE — 2580000003 HC RX 258: Performed by: EMERGENCY MEDICINE

## 2019-09-17 PROCEDURE — 70450 CT HEAD/BRAIN W/O DYE: CPT

## 2019-09-17 PROCEDURE — 96361 HYDRATE IV INFUSION ADD-ON: CPT

## 2019-09-17 PROCEDURE — 81001 URINALYSIS AUTO W/SCOPE: CPT

## 2019-09-17 PROCEDURE — 82803 BLOOD GASES ANY COMBINATION: CPT

## 2019-09-17 PROCEDURE — 85025 COMPLETE CBC W/AUTO DIFF WBC: CPT

## 2019-09-17 PROCEDURE — 85027 COMPLETE CBC AUTOMATED: CPT

## 2019-09-17 PROCEDURE — 6360000002 HC RX W HCPCS: Performed by: EMERGENCY MEDICINE

## 2019-09-17 RX ORDER — BUDESONIDE 0.5 MG/2ML
0.5 INHALANT ORAL 2 TIMES DAILY
Status: DISCONTINUED | OUTPATIENT
Start: 2019-09-18 | End: 2019-09-20 | Stop reason: HOSPADM

## 2019-09-17 RX ORDER — WARFARIN SODIUM 2 MG/1
4 TABLET ORAL
Status: COMPLETED | OUTPATIENT
Start: 2019-09-18 | End: 2019-09-18

## 2019-09-17 RX ORDER — SODIUM CHLORIDE 0.9 % (FLUSH) 0.9 %
10 SYRINGE (ML) INJECTION EVERY 12 HOURS SCHEDULED
Status: DISCONTINUED | OUTPATIENT
Start: 2019-09-17 | End: 2019-09-20 | Stop reason: HOSPADM

## 2019-09-17 RX ORDER — ARIPIPRAZOLE 5 MG/1
15 TABLET ORAL NIGHTLY
Status: DISCONTINUED | OUTPATIENT
Start: 2019-09-18 | End: 2019-09-20 | Stop reason: HOSPADM

## 2019-09-17 RX ORDER — FORMOTEROL FUMARATE 20 UG/2ML
20 SOLUTION RESPIRATORY (INHALATION) 2 TIMES DAILY
Status: DISCONTINUED | OUTPATIENT
Start: 2019-09-18 | End: 2019-09-20 | Stop reason: HOSPADM

## 2019-09-17 RX ORDER — SODIUM CHLORIDE 0.9 % (FLUSH) 0.9 %
10 SYRINGE (ML) INJECTION PRN
Status: DISCONTINUED | OUTPATIENT
Start: 2019-09-17 | End: 2019-09-20 | Stop reason: HOSPADM

## 2019-09-17 RX ORDER — LEVOTHYROXINE SODIUM 0.03 MG/1
25 TABLET ORAL DAILY
Status: DISCONTINUED | OUTPATIENT
Start: 2019-09-18 | End: 2019-09-20 | Stop reason: HOSPADM

## 2019-09-17 RX ORDER — FLUTICASONE FUROATE AND VILANTEROL 200; 25 UG/1; UG/1
1 POWDER RESPIRATORY (INHALATION) DAILY
Status: DISCONTINUED | OUTPATIENT
Start: 2019-09-18 | End: 2019-09-17

## 2019-09-17 RX ORDER — LANOLIN ALCOHOL/MO/W.PET/CERES
CREAM (GRAM) TOPICAL 2 TIMES DAILY
Status: DISCONTINUED | OUTPATIENT
Start: 2019-09-18 | End: 2019-09-20 | Stop reason: HOSPADM

## 2019-09-17 RX ORDER — SODIUM CHLORIDE 9 MG/ML
INJECTION, SOLUTION INTRAVENOUS CONTINUOUS
Status: DISCONTINUED | OUTPATIENT
Start: 2019-09-17 | End: 2019-09-19

## 2019-09-17 RX ORDER — ACETAMINOPHEN 325 MG/1
650 TABLET ORAL EVERY 4 HOURS PRN
Status: DISCONTINUED | OUTPATIENT
Start: 2019-09-17 | End: 2019-09-20 | Stop reason: HOSPADM

## 2019-09-17 RX ORDER — CLOZAPINE 100 MG/1
100 TABLET ORAL NIGHTLY
Status: DISCONTINUED | OUTPATIENT
Start: 2019-09-18 | End: 2019-09-18

## 2019-09-17 RX ORDER — UREA 10 %
3 LOTION (ML) TOPICAL NIGHTLY PRN
Status: DISCONTINUED | OUTPATIENT
Start: 2019-09-17 | End: 2019-09-20 | Stop reason: HOSPADM

## 2019-09-17 RX ORDER — 0.9 % SODIUM CHLORIDE 0.9 %
1000 INTRAVENOUS SOLUTION INTRAVENOUS ONCE
Status: COMPLETED | OUTPATIENT
Start: 2019-09-17 | End: 2019-09-17

## 2019-09-17 RX ORDER — VALPROIC ACID 250 MG/5ML
500 SOLUTION ORAL 3 TIMES DAILY
Status: DISCONTINUED | OUTPATIENT
Start: 2019-09-18 | End: 2019-09-20 | Stop reason: HOSPADM

## 2019-09-17 RX ORDER — ATORVASTATIN CALCIUM 20 MG/1
10 TABLET, FILM COATED ORAL NIGHTLY
Status: DISCONTINUED | OUTPATIENT
Start: 2019-09-18 | End: 2019-09-20 | Stop reason: HOSPADM

## 2019-09-17 RX ADMIN — SODIUM CHLORIDE 1000 ML: 9 INJECTION, SOLUTION INTRAVENOUS at 21:08

## 2019-09-17 RX ADMIN — SODIUM CHLORIDE 1000 ML: 9 INJECTION, SOLUTION INTRAVENOUS at 16:36

## 2019-09-17 RX ADMIN — CEFTRIAXONE 1 G: 1 INJECTION, POWDER, FOR SOLUTION INTRAMUSCULAR; INTRAVENOUS at 21:08

## 2019-09-17 ASSESSMENT — PAIN DESCRIPTION - ORIENTATION: ORIENTATION: RIGHT

## 2019-09-17 ASSESSMENT — PAIN DESCRIPTION - PAIN TYPE: TYPE: CHRONIC PAIN

## 2019-09-17 ASSESSMENT — PAIN - FUNCTIONAL ASSESSMENT: PAIN_FUNCTIONAL_ASSESSMENT: PREVENTS OR INTERFERES SOME ACTIVE ACTIVITIES AND ADLS

## 2019-09-17 ASSESSMENT — PAIN SCALES - GENERAL: PAINLEVEL_OUTOF10: 10

## 2019-09-17 ASSESSMENT — PAIN DESCRIPTION - LOCATION: LOCATION: LEG

## 2019-09-17 ASSESSMENT — PAIN DESCRIPTION - DESCRIPTORS: DESCRIPTORS: SHARP

## 2019-09-17 ASSESSMENT — PAIN DESCRIPTION - ONSET: ONSET: ON-GOING

## 2019-09-17 ASSESSMENT — PAIN DESCRIPTION - FREQUENCY: FREQUENCY: CONTINUOUS

## 2019-09-17 ASSESSMENT — PAIN DESCRIPTION - PROGRESSION: CLINICAL_PROGRESSION: NOT CHANGED

## 2019-09-17 NOTE — ED NOTES
Bed: A07-07  Expected date:   Expected time:   Means of arrival:   Comments:  [facility]  09/17/19 1541

## 2019-09-17 NOTE — ED PROVIDER NOTES
surgical history that includes Tubal ligation; Colonoscopy (3/14/2012); and joint replacement (Left). Her Family history is unknown by patient. She reports that she has been smoking cigarettes. She has been smoking about 0.50 packs per day. She has never used smokeless tobacco. She reports that she does not drink alcohol or use drugs. Medications     Previous Medications    ARIPIPRAZOLE (ABILIFY) 15 MG TABLET    Take 15 mg by mouth nightly     ATORVASTATIN (LIPITOR) 10 MG TABLET    Take 10 mg by mouth nightly     CHOLECALCIFEROL (VITAMIN D) 2000 UNITS TABS TABLET    Take 2,000 Units by mouth nightly     CHOLESTYRAMINE LIGHT 4 G PACKET    Take 4 g by mouth daily    CLOZAPINE (CLOZARIL) 100 MG TABLET    Take 100 mg by mouth nightly     DIPHENHYDRAMINE (BENADRYL) 25 MG CAPSULE    Take 25 mg by mouth every 6 hours as needed for Itching    DULAGLUTIDE (TRULICITY) 1.5 CP/8.3UA SOPN    Inject 1 Syringe into the skin every 7 days Tuesdays    FERROUS SULFATE 325 (65 FE) MG TABLET    Take 325 mg by mouth daily (with breakfast)    FLUTICASONE FUROATE-VILANTEROL 200-25 MCG/INH AEPB    Inhale 1 puff into the lungs daily    FUROSEMIDE (LASIX) 20 MG TABLET    Take 20 mg by mouth 2 times daily    GLIPIZIDE (GLUCOTROL XL) 5 MG EXTENDED RELEASE TABLET    Take 1 tablet by mouth daily    LEVOTHYROXINE (SYNTHROID) 25 MCG TABLET    Take 25 mcg by mouth Daily    LISINOPRIL (PRINIVIL;ZESTRIL) 2.5 MG TABLET    Take 2.5 mg by mouth daily     SPIRONOLACTONE (ALDACTONE) 25 MG TABLET    Take 1 tablet by mouth daily    THERAPEUTIC MULTIVITAMIN-MINERALS (THERAGRAN-M) TABLET    Take 1 tablet by mouth daily. TIOTROPIUM (SPIRIVA RESPIMAT) 2.5 MCG/ACT AERS INHALER    Inhale 2 puffs into the lungs daily    VALPROIC ACID (DEPAKENE) 250 MG/5ML SYRP    Take 500 mg by mouth 3 times daily     VITAMIN B-12 (CYANOCOBALAMIN) 1000 MCG TABLET    Take 1,000 mcg by mouth every evening        Allergies     She has No Known Allergies.     Physical Exam

## 2019-09-17 NOTE — ED NOTES
Pt unable to provide urine sample at this time pt refused to be straight cathed     Felix Talbot, HEBERT  09/17/19 8044

## 2019-09-18 LAB
ALBUMIN SERPL-MCNC: 3.1 G/DL (ref 3.4–5)
AMPHETAMINE SCREEN, URINE: NORMAL
ANION GAP SERPL CALCULATED.3IONS-SCNC: 10 MMOL/L (ref 3–16)
BARBITURATE SCREEN URINE: NORMAL
BASOPHILS ABSOLUTE: 0 K/UL (ref 0–0.2)
BASOPHILS RELATIVE PERCENT: 0.3 %
BENZODIAZEPINE SCREEN, URINE: NORMAL
BUN BLDV-MCNC: 13 MG/DL (ref 7–20)
CALCIUM SERPL-MCNC: 8.5 MG/DL (ref 8.3–10.6)
CANNABINOID SCREEN URINE: NORMAL
CHLORIDE BLD-SCNC: 104 MMOL/L (ref 99–110)
CO2: 28 MMOL/L (ref 21–32)
COCAINE METABOLITE SCREEN URINE: NORMAL
CREAT SERPL-MCNC: 0.8 MG/DL (ref 0.6–1.2)
EOSINOPHILS ABSOLUTE: 0.1 K/UL (ref 0–0.6)
EOSINOPHILS RELATIVE PERCENT: 0.9 %
ETHANOL: NORMAL MG/DL (ref 0–0.08)
GFR AFRICAN AMERICAN: >60
GFR NON-AFRICAN AMERICAN: >60
GLUCOSE BLD-MCNC: 135 MG/DL (ref 70–99)
GLUCOSE BLD-MCNC: 162 MG/DL (ref 70–99)
GLUCOSE BLD-MCNC: 168 MG/DL (ref 70–99)
GLUCOSE BLD-MCNC: 181 MG/DL (ref 70–99)
GLUCOSE BLD-MCNC: 209 MG/DL (ref 70–99)
GLUCOSE BLD-MCNC: 334 MG/DL (ref 70–99)
HCT VFR BLD CALC: 31.6 % (ref 36–48)
HEMOGLOBIN: 10.4 G/DL (ref 12–16)
INR BLD: 1.77 (ref 0.86–1.14)
LYMPHOCYTES ABSOLUTE: 1.6 K/UL (ref 1–5.1)
LYMPHOCYTES RELATIVE PERCENT: 25.4 %
Lab: NORMAL
MAGNESIUM: 1.6 MG/DL (ref 1.8–2.4)
MCH RBC QN AUTO: 29.4 PG (ref 26–34)
MCHC RBC AUTO-ENTMCNC: 33 G/DL (ref 31–36)
MCV RBC AUTO: 89.1 FL (ref 80–100)
METHADONE SCREEN, URINE: NORMAL
MONOCYTES ABSOLUTE: 0.4 K/UL (ref 0–1.3)
MONOCYTES RELATIVE PERCENT: 6.9 %
NEUTROPHILS ABSOLUTE: 4.2 K/UL (ref 1.7–7.7)
NEUTROPHILS RELATIVE PERCENT: 66.5 %
OPIATE SCREEN URINE: NORMAL
OXYCODONE URINE: NORMAL
PDW BLD-RTO: 18.1 % (ref 12.4–15.4)
PERFORMED ON: ABNORMAL
PH UA: 5
PHENCYCLIDINE SCREEN URINE: NORMAL
PHOSPHORUS: 2.9 MG/DL (ref 2.5–4.9)
PLATELET # BLD: 221 K/UL (ref 135–450)
PMV BLD AUTO: 8.6 FL (ref 5–10.5)
POTASSIUM SERPL-SCNC: 3.9 MMOL/L (ref 3.5–5.1)
PROPOXYPHENE SCREEN: NORMAL
PROTHROMBIN TIME: 20.2 SEC (ref 9.8–13)
RBC # BLD: 3.54 M/UL (ref 4–5.2)
SODIUM BLD-SCNC: 142 MMOL/L (ref 136–145)
TOTAL CK: 26 U/L (ref 26–192)
TSH REFLEX: 4.12 UIU/ML (ref 0.27–4.2)
WBC # BLD: 6.4 K/UL (ref 4–11)

## 2019-09-18 PROCEDURE — 6370000000 HC RX 637 (ALT 250 FOR IP): Performed by: INTERNAL MEDICINE

## 2019-09-18 PROCEDURE — 94640 AIRWAY INHALATION TREATMENT: CPT

## 2019-09-18 PROCEDURE — 85025 COMPLETE CBC W/AUTO DIFF WBC: CPT

## 2019-09-18 PROCEDURE — G0008 ADMIN INFLUENZA VIRUS VAC: HCPCS | Performed by: INTERNAL MEDICINE

## 2019-09-18 PROCEDURE — 6370000000 HC RX 637 (ALT 250 FOR IP): Performed by: STUDENT IN AN ORGANIZED HEALTH CARE EDUCATION/TRAINING PROGRAM

## 2019-09-18 PROCEDURE — 85610 PROTHROMBIN TIME: CPT

## 2019-09-18 PROCEDURE — 80307 DRUG TEST PRSMV CHEM ANLYZR: CPT

## 2019-09-18 PROCEDURE — 97110 THERAPEUTIC EXERCISES: CPT

## 2019-09-18 PROCEDURE — 82550 ASSAY OF CK (CPK): CPT

## 2019-09-18 PROCEDURE — 2580000003 HC RX 258: Performed by: STUDENT IN AN ORGANIZED HEALTH CARE EDUCATION/TRAINING PROGRAM

## 2019-09-18 PROCEDURE — 6360000002 HC RX W HCPCS: Performed by: STUDENT IN AN ORGANIZED HEALTH CARE EDUCATION/TRAINING PROGRAM

## 2019-09-18 PROCEDURE — 97530 THERAPEUTIC ACTIVITIES: CPT

## 2019-09-18 PROCEDURE — 1200000000 HC SEMI PRIVATE

## 2019-09-18 PROCEDURE — 36415 COLL VENOUS BLD VENIPUNCTURE: CPT

## 2019-09-18 PROCEDURE — 2580000003 HC RX 258: Performed by: INTERNAL MEDICINE

## 2019-09-18 PROCEDURE — 6360000002 HC RX W HCPCS: Performed by: INTERNAL MEDICINE

## 2019-09-18 PROCEDURE — 97535 SELF CARE MNGMENT TRAINING: CPT

## 2019-09-18 PROCEDURE — 84443 ASSAY THYROID STIM HORMONE: CPT

## 2019-09-18 PROCEDURE — 97162 PT EVAL MOD COMPLEX 30 MIN: CPT

## 2019-09-18 PROCEDURE — 94761 N-INVAS EAR/PLS OXIMETRY MLT: CPT

## 2019-09-18 PROCEDURE — G0480 DRUG TEST DEF 1-7 CLASSES: HCPCS

## 2019-09-18 PROCEDURE — 90686 IIV4 VACC NO PRSV 0.5 ML IM: CPT | Performed by: INTERNAL MEDICINE

## 2019-09-18 PROCEDURE — 83735 ASSAY OF MAGNESIUM: CPT

## 2019-09-18 PROCEDURE — 97165 OT EVAL LOW COMPLEX 30 MIN: CPT

## 2019-09-18 PROCEDURE — 80069 RENAL FUNCTION PANEL: CPT

## 2019-09-18 PROCEDURE — 97116 GAIT TRAINING THERAPY: CPT

## 2019-09-18 RX ORDER — NICOTINE POLACRILEX 4 MG
15 LOZENGE BUCCAL PRN
Status: DISCONTINUED | OUTPATIENT
Start: 2019-09-18 | End: 2019-09-20 | Stop reason: HOSPADM

## 2019-09-18 RX ORDER — WARFARIN SODIUM 3 MG/1
1.5 TABLET ORAL DAILY
Status: ON HOLD | COMMUNITY
End: 2019-09-20 | Stop reason: HOSPADM

## 2019-09-18 RX ORDER — BENZONATATE 100 MG/1
100 CAPSULE ORAL 3 TIMES DAILY PRN
Status: DISCONTINUED | OUTPATIENT
Start: 2019-09-18 | End: 2019-09-20 | Stop reason: HOSPADM

## 2019-09-18 RX ORDER — GLIPIZIDE 10 MG/1
10 TABLET ORAL
Status: ON HOLD | COMMUNITY
End: 2019-11-09 | Stop reason: CLARIF

## 2019-09-18 RX ORDER — CLOZAPINE 100 MG/1
100 TABLET ORAL 2 TIMES DAILY
COMMUNITY

## 2019-09-18 RX ORDER — DEXTROSE MONOHYDRATE 25 G/50ML
12.5 INJECTION, SOLUTION INTRAVENOUS PRN
Status: DISCONTINUED | OUTPATIENT
Start: 2019-09-18 | End: 2019-09-20 | Stop reason: HOSPADM

## 2019-09-18 RX ORDER — OMEPRAZOLE 40 MG/1
40 CAPSULE, DELAYED RELEASE ORAL DAILY
COMMUNITY

## 2019-09-18 RX ORDER — LISINOPRIL 5 MG/1
2.5 TABLET ORAL DAILY
Status: ON HOLD | COMMUNITY
End: 2019-11-13 | Stop reason: HOSPADM

## 2019-09-18 RX ORDER — GABAPENTIN 300 MG/1
300 CAPSULE ORAL NIGHTLY
Status: DISCONTINUED | OUTPATIENT
Start: 2019-09-18 | End: 2019-09-20 | Stop reason: HOSPADM

## 2019-09-18 RX ORDER — DEXTROSE MONOHYDRATE 50 MG/ML
100 INJECTION, SOLUTION INTRAVENOUS PRN
Status: DISCONTINUED | OUTPATIENT
Start: 2019-09-18 | End: 2019-09-20 | Stop reason: HOSPADM

## 2019-09-18 RX ORDER — WARFARIN SODIUM 2 MG/1
2 TABLET ORAL DAILY
Status: DISCONTINUED | OUTPATIENT
Start: 2019-09-18 | End: 2019-09-20

## 2019-09-18 RX ORDER — MAGNESIUM SULFATE IN WATER 40 MG/ML
2 INJECTION, SOLUTION INTRAVENOUS ONCE
Status: COMPLETED | OUTPATIENT
Start: 2019-09-18 | End: 2019-09-18

## 2019-09-18 RX ORDER — CLOZAPINE 100 MG/1
100 TABLET ORAL 2 TIMES DAILY
Status: DISCONTINUED | OUTPATIENT
Start: 2019-09-18 | End: 2019-09-20 | Stop reason: HOSPADM

## 2019-09-18 RX ORDER — GUAIFENESIN/DEXTROMETHORPHAN 100-10MG/5
5 SYRUP ORAL EVERY 4 HOURS PRN
Status: DISCONTINUED | OUTPATIENT
Start: 2019-09-18 | End: 2019-09-20 | Stop reason: HOSPADM

## 2019-09-18 RX ORDER — GABAPENTIN 300 MG/1
300 CAPSULE ORAL 3 TIMES DAILY
Status: ON HOLD | COMMUNITY
End: 2019-09-20 | Stop reason: HOSPADM

## 2019-09-18 RX ADMIN — Medication 3 MG: at 23:54

## 2019-09-18 RX ADMIN — ATORVASTATIN CALCIUM 10 MG: 20 TABLET, FILM COATED ORAL at 00:31

## 2019-09-18 RX ADMIN — CLOZAPINE 100 MG: 100 TABLET ORAL at 23:56

## 2019-09-18 RX ADMIN — Medication 10 ML: at 00:41

## 2019-09-18 RX ADMIN — Medication: at 08:21

## 2019-09-18 RX ADMIN — ARIPIPRAZOLE 15 MG: 5 TABLET ORAL at 00:31

## 2019-09-18 RX ADMIN — VALPROIC ACID 500 MG: 250 SOLUTION ORAL at 00:25

## 2019-09-18 RX ADMIN — VALPROIC ACID 500 MG: 250 SOLUTION ORAL at 23:57

## 2019-09-18 RX ADMIN — ACETAMINOPHEN 650 MG: 325 TABLET ORAL at 17:10

## 2019-09-18 RX ADMIN — INSULIN LISPRO 2 UNITS: 100 INJECTION, SOLUTION INTRAVENOUS; SUBCUTANEOUS at 12:09

## 2019-09-18 RX ADMIN — WARFARIN SODIUM 2 MG: 2 TABLET ORAL at 18:44

## 2019-09-18 RX ADMIN — CLOZAPINE 100 MG: 100 TABLET ORAL at 12:09

## 2019-09-18 RX ADMIN — ENOXAPARIN SODIUM 80 MG: 80 INJECTION SUBCUTANEOUS at 23:56

## 2019-09-18 RX ADMIN — ARIPIPRAZOLE 15 MG: 5 TABLET ORAL at 23:54

## 2019-09-18 RX ADMIN — MAGNESIUM SULFATE HEPTAHYDRATE 2 G: 40 INJECTION, SOLUTION INTRAVENOUS at 10:54

## 2019-09-18 RX ADMIN — ENOXAPARIN SODIUM 80 MG: 80 INJECTION SUBCUTANEOUS at 08:21

## 2019-09-18 RX ADMIN — Medication: at 00:24

## 2019-09-18 RX ADMIN — LEVOTHYROXINE SODIUM 25 MCG: 25 TABLET ORAL at 05:21

## 2019-09-18 RX ADMIN — BUDESONIDE 500 MCG: 0.5 SUSPENSION RESPIRATORY (INHALATION) at 09:11

## 2019-09-18 RX ADMIN — FORMOTEROL FUMARATE DIHYDRATE 20 MCG: 20 SOLUTION RESPIRATORY (INHALATION) at 21:35

## 2019-09-18 RX ADMIN — VALPROIC ACID 500 MG: 250 SOLUTION ORAL at 08:21

## 2019-09-18 RX ADMIN — WARFARIN SODIUM 4 MG: 2 TABLET ORAL at 00:31

## 2019-09-18 RX ADMIN — INSULIN LISPRO 1 UNITS: 100 INJECTION, SOLUTION INTRAVENOUS; SUBCUTANEOUS at 08:24

## 2019-09-18 RX ADMIN — GABAPENTIN 300 MG: 300 CAPSULE ORAL at 23:54

## 2019-09-18 RX ADMIN — INFLUENZA A VIRUS A/BRISBANE/02/2018 IVR-190 (H1N1) ANTIGEN (PROPIOLACTONE INACTIVATED), INFLUENZA A VIRUS A/KANSAS/14/2017 X-327 (H3N2) ANTIGEN (PROPIOLACTONE INACTIVATED), INFLUENZA B VIRUS B/MARYLAND/15/2016 ANTIGEN (PROPIOLACTONE INACTIVATED), INFLUENZA B VIRUS B/PHUKET/3073/2013 BVR-1B ANTIGEN (PROPIOLACTONE INACTIVATED) 0.5 ML: 15; 15; 15; 15 INJECTION, SUSPENSION INTRAMUSCULAR at 08:23

## 2019-09-18 RX ADMIN — SODIUM CHLORIDE: 9 INJECTION, SOLUTION INTRAVENOUS at 00:40

## 2019-09-18 RX ADMIN — BUDESONIDE 500 MCG: 0.5 SUSPENSION RESPIRATORY (INHALATION) at 21:35

## 2019-09-18 RX ADMIN — CLOZAPINE 100 MG: 100 TABLET ORAL at 00:25

## 2019-09-18 RX ADMIN — INSULIN LISPRO 3 UNITS: 100 INJECTION, SOLUTION INTRAVENOUS; SUBCUTANEOUS at 02:44

## 2019-09-18 RX ADMIN — Medication 10 ML: at 08:22

## 2019-09-18 RX ADMIN — TIOTROPIUM BROMIDE 18 MCG: 18 CAPSULE ORAL; RESPIRATORY (INHALATION) at 09:12

## 2019-09-18 RX ADMIN — FORMOTEROL FUMARATE DIHYDRATE 20 MCG: 20 SOLUTION RESPIRATORY (INHALATION) at 09:11

## 2019-09-18 RX ADMIN — SODIUM CHLORIDE: 9 INJECTION, SOLUTION INTRAVENOUS at 10:54

## 2019-09-18 RX ADMIN — Medication: at 23:56

## 2019-09-18 RX ADMIN — INSULIN GLARGINE 23 UNITS: 100 INJECTION, SOLUTION SUBCUTANEOUS at 02:44

## 2019-09-18 RX ADMIN — VALPROIC ACID 500 MG: 250 SOLUTION ORAL at 14:01

## 2019-09-18 RX ADMIN — CEFTRIAXONE 1 G: 1 INJECTION, POWDER, FOR SOLUTION INTRAMUSCULAR; INTRAVENOUS at 23:54

## 2019-09-18 RX ADMIN — ENOXAPARIN SODIUM 80 MG: 80 INJECTION SUBCUTANEOUS at 00:25

## 2019-09-18 RX ADMIN — ATORVASTATIN CALCIUM 10 MG: 20 TABLET, FILM COATED ORAL at 23:54

## 2019-09-18 ASSESSMENT — ENCOUNTER SYMPTOMS
ALLERGIC/IMMUNOLOGIC NEGATIVE: 1
GASTROINTESTINAL NEGATIVE: 1
EYES NEGATIVE: 1
RESPIRATORY NEGATIVE: 1

## 2019-09-18 ASSESSMENT — PAIN SCALES - GENERAL
PAINLEVEL_OUTOF10: 0
PAINLEVEL_OUTOF10: 0
PAINLEVEL_OUTOF10: 3
PAINLEVEL_OUTOF10: 7
PAINLEVEL_OUTOF10: 0
PAINLEVEL_OUTOF10: 8

## 2019-09-18 ASSESSMENT — PAIN DESCRIPTION - DESCRIPTORS: DESCRIPTORS: SHARP

## 2019-09-18 ASSESSMENT — PAIN DESCRIPTION - FREQUENCY: FREQUENCY: CONTINUOUS

## 2019-09-18 ASSESSMENT — PAIN DESCRIPTION - LOCATION
LOCATION: LEG
LOCATION: LEG

## 2019-09-18 ASSESSMENT — PAIN DESCRIPTION - PROGRESSION: CLINICAL_PROGRESSION: NOT CHANGED

## 2019-09-18 ASSESSMENT — PAIN DESCRIPTION - ORIENTATION
ORIENTATION: RIGHT
ORIENTATION: RIGHT

## 2019-09-18 ASSESSMENT — PAIN SCALES - WONG BAKER: WONGBAKER_NUMERICALRESPONSE: 4

## 2019-09-18 ASSESSMENT — PAIN - FUNCTIONAL ASSESSMENT: PAIN_FUNCTIONAL_ASSESSMENT: PREVENTS OR INTERFERES SOME ACTIVE ACTIVITIES AND ADLS

## 2019-09-18 ASSESSMENT — PAIN DESCRIPTION - ONSET: ONSET: ON-GOING

## 2019-09-18 ASSESSMENT — PAIN DESCRIPTION - PAIN TYPE: TYPE: CHRONIC PAIN
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
## 2019-09-18 NOTE — PROGRESS NOTES
Clinical Pharmacy Progress Note    Admit date: 9/17/2019     Subjective/Objective:  Pt is a 65yof with PMHx that includes schizophrenia, A.fib (on Warfarin), HTN, GERD, neuropathy, DM 2, depression, COPD, and chronic pain who is admitted after being found slumped over on a bench - being evaluated for hypotension, AMS, and possible UTI. Pharmacy is consulted to dose Warfarin per Dr. Radha Dickens  Target INR = 2.0-3.0 for h/o A.fib  Home dose = 1.5mg po daily  · Dose confirmed with caregiver at patient's [facility] ([phone])  · Of note, patient has been on higher doses (4mg daily) in the past    Current anticoagulation regimen:  Enoxaparin 80mg subcut q12h  Warfarin - Pharmacy to dose    Date INR Warfarin   9/17 1.68 4mg   9/18 1.77                     Current antibiotics:  Ceftriaxone 1g IV q24h - day #2    Recent Labs     09/17/19  1622 09/18/19  0716   * 142   K 4.0 3.9   CL 93* 104   CO2 28 28   BUN 16 13   CREATININE 0.8 0.8   GLUCOSE 218* 168*       Estimated Creatinine Clearance: 85 mL/min (based on SCr of 0.8 mg/dL). Lab Results   Component Value Date    WBC 6.4 09/18/2019    HGB 10.4 (L) 09/18/2019    HCT 31.6 (L) 09/18/2019    MCV 89.1 09/18/2019     09/18/2019       Lab Results   Component Value Date    PROTIME 20.2 (H) 09/18/2019    INR 1.77 (H) 09/18/2019       Height:  5' 6\" (167.6 cm)  Weight:  204 lb (92.5 kg)    Culture results:  Blood (9/17) = pending  Urine (9/17) = pending    Prophylaxis:  VTE:  Enoxaparin + Warfarin  GI:  Not indicated    Vaccination screening:  Pneumonia:  Up to date  Influenza:  Given 9/18/19    Assessment/Plan:  1)  H/o A.fib, on Warfarin:  Pharmacy to dose, target INR = 2.0-3.0  · INR today = 1.77  · Pt received bolus dose of Warfarin 4mg x1 on admission last night. Will start Warfarin 2mg po daily (slightly higher than reported home dose given subtherapeutic INR on admission). · Once INR is therapeutic, pharmacy will d/c Enoxaparin as per protocol.   · Daily

## 2019-09-18 NOTE — PROGRESS NOTES
90 ml/min or greater, COPD (chronic obstructive pulmonary disease) ([address] Depression, Diabetes mellitus, type 2 ([address] Diabetic neuropathy ([address] GERD (gastroesophageal reflux disease), History of DVT (deep vein thrombosis), History of primary hyperparathyroidism, Hypertension, Lymphedema of leg, Nasal bone fracture, Normal stress echocardiogram, OA (osteoarthritis), and Schizophrenia ([address] has a past surgical history that includes Tubal ligation; Colonoscopy (3/14/2012); and joint replacement (Left). Restrictions  Restrictions/Precautions  Restrictions/Precautions: Fall Risk(high fall risk)  Vision/Hearing  Vision: Impaired  Vision Exceptions: Wears glasses at all times(does not have glasses here. )  Hearing: Within functional limits     Subjective  General  Chart Reviewed: Yes  Additional Pertinent Hx: Landon Arellano is a 66 y.o. female who presents by EMS from her group home for altered mental status, and reported concern for stroke. According to EMS they were called for a possible stroke, as the patient had gone outside to smoke, and was found slumped over on the bench. According to the patient, she has been feeling poorly for a couple of days now, and she describes increasing generalized fatigue and weakness. She states that she is feeling weak in all 4 of her extremities, not more so on one side than the other. Patient states that she generally walks with a walker. She states that she was able to get outside, but then became so weak that she could not stand back up again. Family / Caregiver Present: No  Diagnosis: hypotension, UTI  Follows Commands: Within Functional Limits  General Comment  Comments: Pt seated in chair upon arrival.   Subjective  Subjective: Pt reports pain R LE but unable to rate.    Pain Screening  Patient Currently in Pain: Yes  Pain Assessment  Pain Assessment: Faces  Lucero-Baker Pain Rating: Hurts little more  Pain Location: Leg  Pain Orientation: Right  Vital Signs  Patient path;Decreased step height;Decreased step length  Distance: Pt amb 100 ft with RW and min A. Comments: Assist for donning shoes. Pt taking 4 standing rest breaks. Min A for balance and keeping RW in close to her. Pt with poor safety awareness and taking hands off RW to talk. Distracted during amb. Stairs/Curb  Stairs?: No     Balance  Posture: Fair  Sitting - Static: Fair;+  Sitting - Dynamic: Fair;+  Standing - Static: Fair  Standing - Dynamic: Fair;-(With RW and min A.)  Exercises  Heelslides: x 10 bilat  Hip Flexion: attempting but too painful   Ankle Pumps: x 5 bilat. Comments: Pt reporting too much pain for further ther ex but wanted pictures of exercises. Pt give picture handouts of AP, FAQs, seated marching with instruction. Plan   Plan  Times per week: 3-5x/week  Times per day: Daily  Safety Devices  Type of devices: All fall risk precautions in place, Call light within reach, Chair alarm in place, Gait belt, Patient at risk for falls, Left in chair, Nurse notified  Restraints  Initially in place: No    G-Code       OutComes Score                                                  AM-PAC Score  AM-PAC Inpatient Mobility Raw Score : 16 (09/18/19 1509)  AM-PAC Inpatient T-Scale Score : 40.78 (09/18/19 1509)  Mobility Inpatient CMS 0-100% Score: 54.16 (09/18/19 1509)  Mobility Inpatient CMS G-Code Modifier : CK (09/18/19 1509)          Goals  Short term goals  Time Frame for Short term goals: To be met by DC. Short term goal 1: Pt to perform bed mob with supervision. Short term goal 2: Pt to perform transfers with supervision. Short term goal 3: Pt to perform amb with AAD and supervision for 50 ft.    Long term goals  Time Frame for Long term goals : LTGs=STGs  Patient Goals   Patient goals : to \"get better\"       Therapy Time   Individual Concurrent Group Co-treatment   Time In 1412         Time Out 1450         Minutes 38         Timed Code Treatment Minutes: [address]

## 2019-09-18 NOTE — PROGRESS NOTES
Clinical Pharmacy Progress Note  Medication History     Admit Date: 9/17/2019    List of of current medications patient is taking is complete. Home Medication list in EPIC updated to reflect changes noted below. Source of information: Staff at patient's [facility] ([phone])    Changes made to medication list:   Medications removed (no longer taking):  · Cholestyramine    Medications added:   · Warfarin  · Advair  · Omeprazole  · Gabapentin     Medication doses / instructions adjusted:   · Lisinopril  · Clozapine   · Glipizide    Other notes:   · Per caregiver at patient's Group Home, patient just started taking Gabapentin again last week after being off of it for >6 months. Caregiver states \"that's when she starting acting funny and different\".       Please call with questions--  Thanks--  Patricia Siu, PharmD, [address] [facility], [address] or [phone] (wireless)  9/18/2019 10:13 AM

## 2019-09-18 NOTE — PROGRESS NOTES
Progress Note    Admit Date: 9/17/2019  Day: 2  Diet: DIET CARB CONTROL;    CC: AMS    Interval history: NAEO. Has left leg pain. otherwise stable, aox3. No other complaints. Denies NVD, HA, CP.    HPI: Pat Alatorre is a 66year old female with PMH of schizophrenia, afib (warfarin), HTN, DM2, who presents after AMS from group home where she resides. Patient went out for a cigarette when she was found by other [facility] members to be slumped over however when awoke patient was AOx3 without any neurological deficits. Patient complains of generalized fatigue and weakness. Denies dysuria, hematuria, polyuria.      In ED:  Hypotension, fluid responsive (2L bolus), other vital signs stable. INR 1.68. Hb 10.8 at baseline. UA with large LE, WBC 20-50 with 1+ bacteria. Started on Rocephin.     Medications:     Scheduled Meds:   insulin lispro  0-6 Units Subcutaneous TID WC    insulin lispro  0-3 Units Subcutaneous Nightly    insulin glargine  0.25 Units/kg Subcutaneous Nightly    warfarin  2 mg Oral Daily    cloZAPine  100 mg Oral BID    gabapentin  300 mg Oral Nightly    sodium chloride flush  10 mL Intravenous 2 times per day    ARIPiprazole  15 mg Oral Nightly    atorvastatin  10 mg Oral Nightly    levothyroxine  25 mcg Oral Daily    tiotropium  1 capsule Inhalation Daily    valproic acid  500 mg Oral TID    cefTRIAXone (ROCEPHIN) IV  1 g Intravenous Q24H    HYDROCERIN   Topical BID    enoxaparin  80 mg Subcutaneous BID    budesonide  0.5 mg Nebulization BID    And    formoterol  20 mcg Nebulization BID     Continuous Infusions:   dextrose      sodium chloride 100 mL/hr at 09/18/19 1054     PRN Meds:glucose, dextrose, glucagon (rDNA), dextrose, benzonatate, guaiFENesin-dextromethorphan, sodium chloride flush, acetaminophen, melatonin    Objective:   Vitals:   T-max:  Patient Vitals for the past 24 hrs:   BP Temp Temp src Pulse Resp SpO2   09/18/19 1638 (!) 114/57 97.6 °F (36.4 °C) Oral 78 16 98 %   09/18/19

## 2019-09-19 LAB
ANION GAP SERPL CALCULATED.3IONS-SCNC: 11 MMOL/L (ref 3–16)
BUN BLDV-MCNC: 15 MG/DL (ref 7–20)
CALCIUM SERPL-MCNC: 8.8 MG/DL (ref 8.3–10.6)
CHLORIDE BLD-SCNC: 102 MMOL/L (ref 99–110)
CO2: 26 MMOL/L (ref 21–32)
CREAT SERPL-MCNC: 0.8 MG/DL (ref 0.6–1.2)
GFR AFRICAN AMERICAN: >60
GFR NON-AFRICAN AMERICAN: >60
GLUCOSE BLD-MCNC: 176 MG/DL (ref 70–99)
GLUCOSE BLD-MCNC: 194 MG/DL (ref 70–99)
GLUCOSE BLD-MCNC: 215 MG/DL (ref 70–99)
GLUCOSE BLD-MCNC: 296 MG/DL (ref 70–99)
GLUCOSE BLD-MCNC: 312 MG/DL (ref 70–99)
HCT VFR BLD CALC: 33.4 % (ref 36–48)
HEMOGLOBIN: 10.9 G/DL (ref 12–16)
INR BLD: 1.85 (ref 0.86–1.14)
LV EF: 55 %
LVEF MODALITY: NORMAL
MCH RBC QN AUTO: 29.3 PG (ref 26–34)
MCHC RBC AUTO-ENTMCNC: 32.6 G/DL (ref 31–36)
MCV RBC AUTO: 89.9 FL (ref 80–100)
PDW BLD-RTO: 18.3 % (ref 12.4–15.4)
PERFORMED ON: ABNORMAL
PLATELET # BLD: 224 K/UL (ref 135–450)
PMV BLD AUTO: 9.3 FL (ref 5–10.5)
POTASSIUM SERPL-SCNC: 4.5 MMOL/L (ref 3.5–5.1)
PROTHROMBIN TIME: 21.1 SEC (ref 9.8–13)
RBC # BLD: 3.72 M/UL (ref 4–5.2)
SODIUM BLD-SCNC: 139 MMOL/L (ref 136–145)
T4 TOTAL: 7.5 UG/DL (ref 4.5–10.9)
URINE CULTURE, ROUTINE: NORMAL
WBC # BLD: 6.1 K/UL (ref 4–11)

## 2019-09-19 PROCEDURE — 2580000003 HC RX 258: Performed by: STUDENT IN AN ORGANIZED HEALTH CARE EDUCATION/TRAINING PROGRAM

## 2019-09-19 PROCEDURE — 6370000000 HC RX 637 (ALT 250 FOR IP): Performed by: STUDENT IN AN ORGANIZED HEALTH CARE EDUCATION/TRAINING PROGRAM

## 2019-09-19 PROCEDURE — 36415 COLL VENOUS BLD VENIPUNCTURE: CPT

## 2019-09-19 PROCEDURE — 80048 BASIC METABOLIC PNL TOTAL CA: CPT

## 2019-09-19 PROCEDURE — 2580000003 HC RX 258: Performed by: INTERNAL MEDICINE

## 2019-09-19 PROCEDURE — 6360000002 HC RX W HCPCS: Performed by: STUDENT IN AN ORGANIZED HEALTH CARE EDUCATION/TRAINING PROGRAM

## 2019-09-19 PROCEDURE — 6360000002 HC RX W HCPCS: Performed by: INTERNAL MEDICINE

## 2019-09-19 PROCEDURE — 84436 ASSAY OF TOTAL THYROXINE: CPT

## 2019-09-19 PROCEDURE — 85610 PROTHROMBIN TIME: CPT

## 2019-09-19 PROCEDURE — 1200000000 HC SEMI PRIVATE

## 2019-09-19 PROCEDURE — 94640 AIRWAY INHALATION TREATMENT: CPT

## 2019-09-19 PROCEDURE — 85027 COMPLETE CBC AUTOMATED: CPT

## 2019-09-19 PROCEDURE — 93306 TTE W/DOPPLER COMPLETE: CPT

## 2019-09-19 PROCEDURE — 94761 N-INVAS EAR/PLS OXIMETRY MLT: CPT

## 2019-09-19 PROCEDURE — 6370000000 HC RX 637 (ALT 250 FOR IP): Performed by: INTERNAL MEDICINE

## 2019-09-19 RX ORDER — WARFARIN SODIUM 2 MG/1
TABLET ORAL
Qty: 30 TABLET | Refills: 3 | Status: CANCELLED | OUTPATIENT
Start: 2019-09-19

## 2019-09-19 RX ADMIN — GABAPENTIN 300 MG: 300 CAPSULE ORAL at 21:16

## 2019-09-19 RX ADMIN — INSULIN LISPRO 1 UNITS: 100 INJECTION, SOLUTION INTRAVENOUS; SUBCUTANEOUS at 08:42

## 2019-09-19 RX ADMIN — INSULIN LISPRO 1 UNITS: 100 INJECTION, SOLUTION INTRAVENOUS; SUBCUTANEOUS at 00:01

## 2019-09-19 RX ADMIN — FORMOTEROL FUMARATE DIHYDRATE 20 MCG: 20 SOLUTION RESPIRATORY (INHALATION) at 08:59

## 2019-09-19 RX ADMIN — ACETAMINOPHEN 650 MG: 325 TABLET ORAL at 16:12

## 2019-09-19 RX ADMIN — Medication 3 MG: at 21:15

## 2019-09-19 RX ADMIN — CLOZAPINE 100 MG: 100 TABLET ORAL at 08:42

## 2019-09-19 RX ADMIN — INSULIN GLARGINE 23 UNITS: 100 INJECTION, SOLUTION SUBCUTANEOUS at 21:14

## 2019-09-19 RX ADMIN — VALPROIC ACID 500 MG: 250 SOLUTION ORAL at 21:19

## 2019-09-19 RX ADMIN — Medication 10 ML: at 21:16

## 2019-09-19 RX ADMIN — INSULIN LISPRO 3 UNITS: 100 INJECTION, SOLUTION INTRAVENOUS; SUBCUTANEOUS at 12:47

## 2019-09-19 RX ADMIN — LEVOTHYROXINE SODIUM 25 MCG: 25 TABLET ORAL at 07:02

## 2019-09-19 RX ADMIN — FORMOTEROL FUMARATE DIHYDRATE 20 MCG: 20 SOLUTION RESPIRATORY (INHALATION) at 20:03

## 2019-09-19 RX ADMIN — ATORVASTATIN CALCIUM 10 MG: 20 TABLET, FILM COATED ORAL at 21:16

## 2019-09-19 RX ADMIN — Medication: at 21:17

## 2019-09-19 RX ADMIN — ARIPIPRAZOLE 15 MG: 5 TABLET ORAL at 21:15

## 2019-09-19 RX ADMIN — INSULIN LISPRO 8 UNITS: 100 INJECTION, SOLUTION INTRAVENOUS; SUBCUTANEOUS at 17:49

## 2019-09-19 RX ADMIN — Medication: at 10:30

## 2019-09-19 RX ADMIN — WARFARIN SODIUM 2 MG: 2 TABLET ORAL at 17:52

## 2019-09-19 RX ADMIN — ENOXAPARIN SODIUM 80 MG: 80 INJECTION SUBCUTANEOUS at 08:41

## 2019-09-19 RX ADMIN — CEFTRIAXONE 1 G: 1 INJECTION, POWDER, FOR SOLUTION INTRAMUSCULAR; INTRAVENOUS at 21:16

## 2019-09-19 RX ADMIN — BUDESONIDE 500 MCG: 0.5 SUSPENSION RESPIRATORY (INHALATION) at 20:03

## 2019-09-19 RX ADMIN — INSULIN LISPRO 2 UNITS: 100 INJECTION, SOLUTION INTRAVENOUS; SUBCUTANEOUS at 21:15

## 2019-09-19 RX ADMIN — SODIUM CHLORIDE: 9 INJECTION, SOLUTION INTRAVENOUS at 01:02

## 2019-09-19 RX ADMIN — TIOTROPIUM BROMIDE 18 MCG: 18 CAPSULE ORAL; RESPIRATORY (INHALATION) at 08:59

## 2019-09-19 RX ADMIN — VALPROIC ACID 500 MG: 250 SOLUTION ORAL at 08:42

## 2019-09-19 RX ADMIN — Medication 10 ML: at 10:30

## 2019-09-19 RX ADMIN — CLOZAPINE 100 MG: 100 TABLET ORAL at 21:15

## 2019-09-19 RX ADMIN — INSULIN GLARGINE 23 UNITS: 100 INJECTION, SOLUTION SUBCUTANEOUS at 00:00

## 2019-09-19 RX ADMIN — VALPROIC ACID 500 MG: 250 SOLUTION ORAL at 15:08

## 2019-09-19 RX ADMIN — BUDESONIDE 500 MCG: 0.5 SUSPENSION RESPIRATORY (INHALATION) at 08:59

## 2019-09-19 RX ADMIN — ENOXAPARIN SODIUM 80 MG: 80 INJECTION SUBCUTANEOUS at 21:19

## 2019-09-19 ASSESSMENT — PAIN SCALES - GENERAL
PAINLEVEL_OUTOF10: 0
PAINLEVEL_OUTOF10: 3

## 2019-09-19 ASSESSMENT — PAIN DESCRIPTION - FREQUENCY: FREQUENCY: INTERMITTENT

## 2019-09-19 ASSESSMENT — ENCOUNTER SYMPTOMS
GASTROINTESTINAL NEGATIVE: 1
RESPIRATORY NEGATIVE: 1
ALLERGIC/IMMUNOLOGIC NEGATIVE: 1
EYES NEGATIVE: 1

## 2019-09-19 ASSESSMENT — PAIN DESCRIPTION - ONSET: ONSET: GRADUAL

## 2019-09-19 ASSESSMENT — PAIN DESCRIPTION - PAIN TYPE: TYPE: ACUTE PAIN

## 2019-09-19 ASSESSMENT — PAIN DESCRIPTION - DESCRIPTORS: DESCRIPTORS: ACHING

## 2019-09-19 ASSESSMENT — PAIN DESCRIPTION - LOCATION: LOCATION: FOOT

## 2019-09-19 ASSESSMENT — PAIN DESCRIPTION - PROGRESSION: CLINICAL_PROGRESSION: NOT CHANGED

## 2019-09-19 ASSESSMENT — PAIN DESCRIPTION - ORIENTATION: ORIENTATION: RIGHT;LEFT

## 2019-09-19 NOTE — PROGRESS NOTES
Clinical Pharmacy Progress Note    Admit date: 9/17/2019     Subjective/Objective:  Pt is a 65yof with PMHx that includes schizophrenia, A.fib (on Warfarin), HTN, GERD, neuropathy, DM 2, depression, COPD, and chronic pain who is admitted after being found slumped over on a bench - being evaluated for hypotension, AMS, and possible UTI. Interval update:  Urine culture with mixed  bobby. INR trending up, but still slightly subtherapeutic. Pharmacy is consulted to dose Warfarin per Dr. Ernestina Mendez  Target INR = 2.0-3.0 for h/o A.fib  Home dose = 1.5mg po daily  · Dose confirmed with caregiver at patient's [address] [facility] ([phone]  · Of note, patient has been on higher doses (4mg daily) in the past    Current anticoagulation regimen:  Enoxaparin 80mg subcut q12h  Warfarin - Pharmacy to dose    Date INR Warfarin   9/17 1.68 4mg   9/18 1.77 2mg   9/19 1.85                Current antibiotics:  Ceftriaxone 1g IV q24h - day #3    Recent Labs     09/17/19  1622 09/18/19  0716   * 142   K 4.0 3.9   CL 93* 104   CO2 28 28   BUN 16 13   CREATININE 0.8 0.8   GLUCOSE 218* 168*       Estimated Creatinine Clearance: 86 mL/min (based on SCr of 0.8 mg/dL).     Lab Results   Component Value Date    WBC 6.4 09/18/2019    HGB 10.4 (L) 09/18/2019    HCT 31.6 (L) 09/18/2019    MCV 89.1 09/18/2019     09/18/2019       Lab Results   Component Value Date    PROTIME 21.1 (H) 09/19/2019    INR 1.85 (H) 09/19/2019       Height:  5' 6\" (167.6 cm)  Weight:  210 lb 5.1 oz (95.4 kg)    Culture results:  Blood (9/17) = No growth to date  Urine (9/17) = >50k mixed  bobby    Prophylaxis:  VTE:  Enoxaparin + Warfarin  GI:  Not indicated    Vaccination screening:  Pneumonia:  Up to date  Influenza:  Given 9/18/19    Assessment/Plan:  1)  H/o A.fib, on Warfarin:  Pharmacy to dose, target INR = 2.0-3.0  · INR today = 1.88  · INR is trending up - continue Warfarin 2mg po daily (slightly higher than home dose given subtherapeutic INR on admission). · Once INR is therapeutic, pharmacy will d/c Enoxaparin as per protocol. · Daily INR will be monitored closely and dose adjustments will be made as appropriate.     Please call with questions--  Thanks--  Dylon Jacobson, PharmD, [facility], [address] or [phone] (wireless)  9/19/2019 10:18 AM

## 2019-09-19 NOTE — PLAN OF CARE
D: stressed importance of daily washing bi LE with soap & water to prevent cellulitis and promote healing. Pt stated \"Nurse at group home doesn't want me to get into shower. \" BI le has thick sloughing dry flaky skin with some redness and weeping. Bi LE washed with hibiclen, Eucerin lotion applied, kerlix, & ace wraps. Bi le elevated on pillows with bi prevalon boots.   A: Cont to monitor during hourly rounds

## 2019-09-19 NOTE — PROGRESS NOTES
09/18/19  0716   LABALBU 3.1*     Troponin:   Recent Labs     09/17/19  1622   TROPONINI <0.01     BNP: No results for input(s): BNP in the last 72 hours. Lipids: No results for input(s): CHOL, HDL in the last 72 hours. Invalid input(s): LDLCALCU, TRIGLYCERIDE  ABGs:  No results for input(s): PHART, ALJ0FDI, PO2ART, DIF8OYG, BEART, THGBART, D6ESADXR, SPE5LZE in the last 72 hours. INR:   Recent Labs     09/17/19  1621 09/18/19  0716 09/19/19  0725   INR 1.68* 1.77* 1.85*     Lactate:   Recent Labs     09/17/19  1628   LACTATE 1.54     Cultures:  -----------------------------------------------------------------  RAD:   CT Head WO Contrast   Final Result      1. No acute intracranial findings. 2.  Adenoid hypertrophy. XR CHEST PORTABLE   Final Result      Left base airspace disease atelectasis versus pneumonia. Assessment/Plan:   AMS; resolved  CT negative for acute findings. The patient recently started taking gabapentin and has been off her baseline since. -UDS negative  - ethanol negative  -ck nl  -TSH nl; 4.12  -CK 26  -bcx ngtd     Possible UTI  UA with 1+ WBC, leukocyte esterase.  - Rocephin  - UCx, BCx pending     Possible PNA  CT findings above. Pt is not afebrile, HDS. Likely chronic changes as she has history of LLL changes on CXR  -cultures pending.  - Continue Rocephin     Hypotension  - Holding lasix 40 mg and very low dose of Lisinopril at 2.5 mg for htn. Pro-bnp 71.  - fluid responsive  - maintenance at 75mL/hr until tmrw am.     COPD  Up to date with pneumovax. Advair, Spiriva.     pAfib  Sinus rhythm, INR subtheraputic  - continue home warfarin     Hx Schizophrenia  - continue home aripiprazole, clozapine, valproic acid     Hyperparathyrdoidism  Most recent PTH 62.  -Ca 9.5  -stable, ctm     CKD Stage 1; stable   Baseline GFR 85, Cr. Alexandro Spangler 8  - ctm, stable.     DM2  - hold home meds  - SSI with accuchecks     Hypothyroidism; 6.48 9/18/19  - continue home Synthroid     Code Status:

## 2019-09-19 NOTE — DISCHARGE SUMMARY
negative, and she showed no clinical signs of pneumonia, there was no indication for treatment. Her UA showed signs of UTI, so she was started on IV rocephin. Her Urine culture showed mixed  bobby. Clinically, she denies dysuria or discharge. Her IV rocephin was discontinued and, she was discharged on 1 day of Ciprofloxacin 500 BID. The patient has a questionable history of atrial fiblization which she takes coumadin for. Her coumadin was theraputic, so pharmacy was consulted to dose coumadin. Her target INR is 2-3, and on discharge it was 1.85, up from 1.68 on admission. She will continue her coumadin on her current dose. She will follow up for repeat INR on an outpatient basis. At home, the patient takes weekly 9.1HH Trulicity injections and glipizide 10mg BID. Her glucose was ranging in the high 200-low 300's during her admission. She will be continued on her home diabetes regiment, with the addition of a humalog low dose sliding scale, to be managed by her [facility] facility. Instructions for low dose sliding scale are provided below:  Glucose: Dose:               No Insulin  140-199 1 Unit  200-249 2 Units  250-299 3 Units  300-349 4 Units  350-399 5 Units  Over 399 6 Units    For discharge, her gabapentin will be discontinued. Otherwise, she was discharged on her previous home medications in addition to her low dose sliding insulin scale. She was discharged back to [address] [place], [place] [address] [facility] in stable condition. Exam:   BP (!) 99/56   Pulse 72   Temp 96.4 °F (35.8 °C) (Oral)   Resp 18   Ht 5' 6\" (1.676 m)   Wt 203 lb 6.4 oz (92.3 kg)   SpO2 96%   BMI 32.83 kg/m²     Physical Exam   Constitutional: She is oriented to person, place, and time. She appears well-developed and well-nourished. Cardiovascular: Normal rate, regular rhythm and normal heart sounds. Exam reveals no gallop and no friction rub. No murmur heard.   Pulmonary/Chest: Effort normal and breath sounds normal. Abdominal: Soft. Bowel sounds are normal.   Musculoskeletal: She exhibits edema. Bilateral LE venous stasis dermatitis. Bilateral LE Lymphedema. Neurological: She is alert and oriented to person, place, and time. Skin: Skin is warm and dry. Psychiatric: She has a normal mood and affect. Significant Test Results  Component Collected Lab   Urine Culture, Routine 09/17/2019  9:05 PM [address] [facility]   >50,000 CFU/ml mixed skin/urogenital bobby. No further workup      Component Collected Lab   Blood Culture, Routine 09/17/2019  4:23 PM [address] [facility]   No Growth to date. Shyam Urias change in status will be called. Component Value Ref Range & Units Status Collected Lab   TSH 4.12  0.27 - 4.20 uIU/mL Final 09/18/2019  7:16 AM [facility]     Component Value Ref Range & Units Status Collected Lab   Total CK 26  26 - 192 U/L Final       Component Value Ref Range & Units Status Collected Lab   Ethanol Lvl None Detected  mg/dL Final 09/18/2019  7:16 AM      Radiology:  CT Head WO Contrast   Final Result      1. No acute intracranial findings. 2.  Adenoid hypertrophy. XR CHEST PORTABLE   Final Result      Left base airspace disease atelectasis versus pneumonia. Component Value Ref Range & Units Status Collected Lab   POC Glucose 194High   70 - 99 mg/dl Final         Consults:     IP CONSULT TO HOSPITALIST  IP CONSULT TO DIETITIAN  IP CONSULT TO CASE MANAGEMENT  IP CONSULT TO SPIRITUAL SERVICES  IP CONSULT TO PHARMACY    Labs:  For convenience and continuity at follow-up the following most recent labs are provided:    Lab Results   Component Value Date    WBC 5.9 09/20/2019    HGB 10.6 09/20/2019    HCT 32.2 09/20/2019    MCV 89.4 09/20/2019     09/20/2019     09/20/2019    K 5.1 09/20/2019    K 4.0 09/17/2019    CL 99 09/20/2019    CO2 27 09/20/2019    BUN 22 09/20/2019    CREATININE 0.9 09/20/2019    CALCIUM 9.1 09/20/2019    PHOS 2.9 09/18/2019    BNP <15.0 01/10/2016    ALKPHOS 168 05/12/2019    ALT 55 05/12/2019    AST 25 05/12/2019    BILITOT <0.2 05/12/2019    BILIDIR <0.2 05/12/2019    LABALBU 3.1 09/18/2019    LDLCALC 55 12/27/2018    TRIG 152 12/27/2018     Lab Results   Component Value Date    INR 1.82 (H) 09/20/2019    INR 1.85 (H) 09/19/2019    INR 1.77 (H) 09/18/2019       Treatments: as mentioned above. DISCHARGE MEDICATION:     Medication List      START taking these medications    benzonatate 100 MG capsule  Commonly known as:  TESSALON  Take 1 capsule by mouth 3 times daily as needed for Cough     ciprofloxacin 500 MG tablet  Commonly known as:  CIPRO  Take 1 tablet by mouth 2 times daily for 1 day  Notes to patient:  Ciprofloxacin  (Cipro)  USE--treat bacterial infection  SIDE EFFECTS--  Upset stomach, diarrhea, sun sensitivity     HYDROCERIN Crea cream  Apply topically 2 times daily     * insulin lispro 100 UNIT/ML pen  Commonly known as:  HUMALOG  Inject 0-3 Units into the skin nightly     * insulin lispro 100 UNIT/ML pen  Commonly known as:  HUMALOG  Inject 0-6 Units into the skin 3 times daily (with meals)         * This list has 2 medication(s) that are the same as other medications prescribed for you. Read the directions carefully, and ask your doctor or other care provider to review them with you. CHANGE how you take these medications    warfarin 1 MG tablet  Commonly known as:  COUMADIN  Please take daily. Repeat INR's weekly.   What changed:    · medication strength  · how much to take  · how to take this  · when to take this  · additional instructions  Notes to patient:  Warfarin  (Coumadin)  USE--; treat or prevent blood clots  SIDE EFFECTS--  Increased risk of bleeding or bruising  Blood thinner- refer to drug information handout        CONTINUE taking these medications    ARIPiprazole 15 MG tablet  Commonly known as:  ABILIFY     atorvastatin 10 MG tablet  Commonly known as:  LIPITOR  Notes to patient:  Atorvastatin

## 2019-09-20 VITALS
DIASTOLIC BLOOD PRESSURE: 56 MMHG | HEART RATE: 72 BPM | OXYGEN SATURATION: 96 % | SYSTOLIC BLOOD PRESSURE: 99 MMHG | RESPIRATION RATE: 18 BRPM | WEIGHT: 203.4 LBS | TEMPERATURE: 96.4 F | BODY MASS INDEX: 32.69 KG/M2 | HEIGHT: 66 IN

## 2019-09-20 LAB
ANION GAP SERPL CALCULATED.3IONS-SCNC: 11 MMOL/L (ref 3–16)
BUN BLDV-MCNC: 22 MG/DL (ref 7–20)
CALCIUM SERPL-MCNC: 9.1 MG/DL (ref 8.3–10.6)
CHLORIDE BLD-SCNC: 99 MMOL/L (ref 99–110)
CO2: 27 MMOL/L (ref 21–32)
CREAT SERPL-MCNC: 0.9 MG/DL (ref 0.6–1.2)
GFR AFRICAN AMERICAN: >60
GFR NON-AFRICAN AMERICAN: >60
GLUCOSE BLD-MCNC: 194 MG/DL (ref 70–99)
GLUCOSE BLD-MCNC: 288 MG/DL (ref 70–99)
GLUCOSE BLD-MCNC: 342 MG/DL (ref 70–99)
HCT VFR BLD CALC: 32.2 % (ref 36–48)
HEMOGLOBIN: 10.6 G/DL (ref 12–16)
INR BLD: 1.82 (ref 0.86–1.14)
MCH RBC QN AUTO: 29.5 PG (ref 26–34)
MCHC RBC AUTO-ENTMCNC: 33 G/DL (ref 31–36)
MCV RBC AUTO: 89.4 FL (ref 80–100)
PDW BLD-RTO: 18.1 % (ref 12.4–15.4)
PERFORMED ON: ABNORMAL
PERFORMED ON: ABNORMAL
PLATELET # BLD: 219 K/UL (ref 135–450)
PMV BLD AUTO: 8.6 FL (ref 5–10.5)
POTASSIUM SERPL-SCNC: 5.1 MMOL/L (ref 3.5–5.1)
PROTHROMBIN TIME: 20.8 SEC (ref 9.8–13)
RBC # BLD: 3.6 M/UL (ref 4–5.2)
SODIUM BLD-SCNC: 137 MMOL/L (ref 136–145)
WBC # BLD: 5.9 K/UL (ref 4–11)

## 2019-09-20 PROCEDURE — 6370000000 HC RX 637 (ALT 250 FOR IP): Performed by: STUDENT IN AN ORGANIZED HEALTH CARE EDUCATION/TRAINING PROGRAM

## 2019-09-20 PROCEDURE — 6370000000 HC RX 637 (ALT 250 FOR IP): Performed by: INTERNAL MEDICINE

## 2019-09-20 PROCEDURE — 94640 AIRWAY INHALATION TREATMENT: CPT

## 2019-09-20 PROCEDURE — 6360000002 HC RX W HCPCS: Performed by: INTERNAL MEDICINE

## 2019-09-20 PROCEDURE — 2580000003 HC RX 258: Performed by: INTERNAL MEDICINE

## 2019-09-20 PROCEDURE — 6360000002 HC RX W HCPCS: Performed by: STUDENT IN AN ORGANIZED HEALTH CARE EDUCATION/TRAINING PROGRAM

## 2019-09-20 RX ORDER — LANOLIN ALCOHOL/MO/W.PET/CERES
CREAM (GRAM) TOPICAL 2 TIMES DAILY
Qty: 1 CONTAINER | Refills: 0 | Status: ON HOLD | OUTPATIENT
Start: 2019-09-20 | End: 2019-11-09 | Stop reason: CLARIF

## 2019-09-20 RX ORDER — CIPROFLOXACIN 500 MG/1
500 TABLET, FILM COATED ORAL 2 TIMES DAILY
Qty: 2 TABLET | Refills: 0 | Status: SHIPPED | OUTPATIENT
Start: 2019-09-20 | End: 2019-09-21

## 2019-09-20 RX ORDER — WARFARIN SODIUM 1 MG/1
TABLET ORAL
Qty: 30 TABLET | Refills: 3 | Status: ON HOLD | OUTPATIENT
Start: 2019-09-20 | End: 2019-11-09 | Stop reason: CLARIF

## 2019-09-20 RX ORDER — BENZONATATE 100 MG/1
100 CAPSULE ORAL 3 TIMES DAILY PRN
Qty: 20 CAPSULE | Refills: 0 | Status: SHIPPED | OUTPATIENT
Start: 2019-09-20 | End: 2019-09-27

## 2019-09-20 RX ADMIN — LEVOTHYROXINE SODIUM 25 MCG: 25 TABLET ORAL at 05:48

## 2019-09-20 RX ADMIN — VALPROIC ACID 500 MG: 250 SOLUTION ORAL at 13:31

## 2019-09-20 RX ADMIN — INSULIN LISPRO 8 UNITS: 100 INJECTION, SOLUTION INTRAVENOUS; SUBCUTANEOUS at 07:14

## 2019-09-20 RX ADMIN — INSULIN LISPRO 4 UNITS: 100 INJECTION, SOLUTION INTRAVENOUS; SUBCUTANEOUS at 08:49

## 2019-09-20 RX ADMIN — TIOTROPIUM BROMIDE 18 MCG: 18 CAPSULE ORAL; RESPIRATORY (INHALATION) at 12:38

## 2019-09-20 RX ADMIN — Medication 10 ML: at 08:05

## 2019-09-20 RX ADMIN — Medication: at 08:04

## 2019-09-20 RX ADMIN — FORMOTEROL FUMARATE DIHYDRATE 20 MCG: 20 SOLUTION RESPIRATORY (INHALATION) at 12:36

## 2019-09-20 RX ADMIN — CLOZAPINE 100 MG: 100 TABLET ORAL at 08:04

## 2019-09-20 RX ADMIN — INSULIN LISPRO 1 UNITS: 100 INJECTION, SOLUTION INTRAVENOUS; SUBCUTANEOUS at 13:32

## 2019-09-20 RX ADMIN — INSULIN LISPRO 4 UNITS: 100 INJECTION, SOLUTION INTRAVENOUS; SUBCUTANEOUS at 13:31

## 2019-09-20 RX ADMIN — VALPROIC ACID 500 MG: 250 SOLUTION ORAL at 08:04

## 2019-09-20 RX ADMIN — ACETAMINOPHEN 650 MG: 325 TABLET ORAL at 00:10

## 2019-09-20 RX ADMIN — BUDESONIDE 500 MCG: 0.5 SUSPENSION RESPIRATORY (INHALATION) at 12:37

## 2019-09-20 RX ADMIN — ENOXAPARIN SODIUM 80 MG: 80 INJECTION SUBCUTANEOUS at 08:04

## 2019-09-20 ASSESSMENT — PAIN SCALES - GENERAL
PAINLEVEL_OUTOF10: 0
PAINLEVEL_OUTOF10: 10
PAINLEVEL_OUTOF10: 10
PAINLEVEL_OUTOF10: 0

## 2019-09-20 ASSESSMENT — PAIN DESCRIPTION - ORIENTATION: ORIENTATION: RIGHT

## 2019-09-20 ASSESSMENT — PAIN SCALES - WONG BAKER: WONGBAKER_NUMERICALRESPONSE: 4

## 2019-09-20 ASSESSMENT — PAIN DESCRIPTION - DESCRIPTORS: DESCRIPTORS: BURNING

## 2019-09-20 ASSESSMENT — PAIN DESCRIPTION - PAIN TYPE: TYPE: ACUTE PAIN

## 2019-09-20 ASSESSMENT — PAIN DESCRIPTION - FREQUENCY: FREQUENCY: CONTINUOUS

## 2019-09-20 ASSESSMENT — PAIN DESCRIPTION - PROGRESSION: CLINICAL_PROGRESSION: NOT CHANGED

## 2019-09-20 ASSESSMENT — PAIN - FUNCTIONAL ASSESSMENT: PAIN_FUNCTIONAL_ASSESSMENT: PREVENTS OR INTERFERES SOME ACTIVE ACTIVITIES AND ADLS

## 2019-09-20 ASSESSMENT — PAIN DESCRIPTION - LOCATION: LOCATION: FOOT

## 2019-09-20 ASSESSMENT — PAIN DESCRIPTION - ONSET: ONSET: ON-GOING

## 2019-09-20 NOTE — PLAN OF CARE
D: Pt miya to transfer from sitting to standing position on own and amb to BR with stand by assistance. C/o some pain R-heel see pain assessment. Soaked bi ft and washed legs while sitting up in chair. Stressed again importance of daily washing bi legs with soap & water to promote healing of thick dry flaky skin and to prevent cellulitis. Encourage to use ace wraps daily to help with bi LE edema and low b/p.    A: Cont to monitor during hourly rounds

## 2019-09-22 LAB
BLOOD CULTURE, ROUTINE: NORMAL
CULTURE, BLOOD 2: NORMAL

## 2019-11-09 ENCOUNTER — APPOINTMENT (OUTPATIENT)
Dept: GENERAL RADIOLOGY | Age: 64
DRG: 383 | End: 2019-11-09
Payer: COMMERCIAL

## 2019-11-09 ENCOUNTER — HOSPITAL ENCOUNTER (INPATIENT)
Age: 64
LOS: 4 days | Discharge: HOME | DRG: 383 | End: 2019-11-13
Attending: EMERGENCY MEDICINE | Admitting: HOSPITALIST
Payer: COMMERCIAL

## 2019-11-09 DIAGNOSIS — I87.8 CHRONIC VENOUS STASIS: ICD-10-CM

## 2019-11-09 DIAGNOSIS — M79.672 PAIN OF LEFT HEEL: ICD-10-CM

## 2019-11-09 DIAGNOSIS — R79.1 ELEVATED INR: ICD-10-CM

## 2019-11-09 DIAGNOSIS — R42 DIZZINESS: Primary | ICD-10-CM

## 2019-11-09 DIAGNOSIS — J18.9 PNEUMONIA DUE TO ORGANISM: ICD-10-CM

## 2019-11-09 DIAGNOSIS — N39.0 UTI (URINARY TRACT INFECTION), UNCOMPLICATED: ICD-10-CM

## 2019-11-09 LAB
ANION GAP SERPL CALCULATED.3IONS-SCNC: 13 MMOL/L (ref 3–16)
BACTERIA: ABNORMAL /HPF
BASE EXCESS VENOUS: 8 (ref -3–3)
BASOPHILS ABSOLUTE: 0 K/UL (ref 0–0.2)
BASOPHILS RELATIVE PERCENT: 0.5 %
BILIRUBIN URINE: NEGATIVE
BLOOD, URINE: ABNORMAL
BUN BLDV-MCNC: 25 MG/DL (ref 7–20)
C-REACTIVE PROTEIN: 78.9 MG/L (ref 0–5.1)
CALCIUM SERPL-MCNC: 9.2 MG/DL (ref 8.3–10.6)
CHLORIDE BLD-SCNC: 91 MMOL/L (ref 99–110)
CLARITY: ABNORMAL
CO2: 31 MMOL/L (ref 21–32)
COLOR: YELLOW
CREAT SERPL-MCNC: 0.7 MG/DL (ref 0.6–1.2)
EKG ATRIAL RATE: 79 BPM
EKG DIAGNOSIS: NORMAL
EKG P AXIS: 63 DEGREES
EKG P-R INTERVAL: 168 MS
EKG Q-T INTERVAL: 436 MS
EKG QRS DURATION: 102 MS
EKG QTC CALCULATION (BAZETT): 499 MS
EKG R AXIS: -65 DEGREES
EKG T AXIS: 63 DEGREES
EKG VENTRICULAR RATE: 79 BPM
EOSINOPHILS ABSOLUTE: 0.1 K/UL (ref 0–0.6)
EOSINOPHILS RELATIVE PERCENT: 1.3 %
EPITHELIAL CELLS, UA: ABNORMAL /HPF
GFR AFRICAN AMERICAN: >60
GFR NON-AFRICAN AMERICAN: >60
GLUCOSE BLD-MCNC: 311 MG/DL (ref 70–99)
GLUCOSE BLD-MCNC: 320 MG/DL (ref 70–99)
GLUCOSE BLD-MCNC: 376 MG/DL (ref 70–99)
GLUCOSE BLD-MCNC: 420 MG/DL (ref 70–99)
GLUCOSE URINE: 500 MG/DL
HCO3 VENOUS: 33.3 MMOL/L (ref 23–29)
HCT VFR BLD CALC: 35.7 % (ref 36–48)
HEMOGLOBIN: 11.8 G/DL (ref 12–16)
INR BLD: 2.93 (ref 0.86–1.14)
KETONES, URINE: NEGATIVE MG/DL
LACTATE: 1.7 MMOL/L (ref 0.4–2)
LEUKOCYTE ESTERASE, URINE: ABNORMAL
LYMPHOCYTES ABSOLUTE: 1.2 K/UL (ref 1–5.1)
LYMPHOCYTES RELATIVE PERCENT: 20.8 %
MCH RBC QN AUTO: 29.6 PG (ref 26–34)
MCHC RBC AUTO-ENTMCNC: 33 G/DL (ref 31–36)
MCV RBC AUTO: 89.8 FL (ref 80–100)
MICROSCOPIC EXAMINATION: YES
MONOCYTES ABSOLUTE: 0.5 K/UL (ref 0–1.3)
MONOCYTES RELATIVE PERCENT: 8.3 %
NEUTROPHILS ABSOLUTE: 3.9 K/UL (ref 1.7–7.7)
NEUTROPHILS RELATIVE PERCENT: 69.1 %
NITRITE, URINE: NEGATIVE
O2 SAT, VEN: 52 %
PCO2, VEN: 58.6 MM HG (ref 40–50)
PDW BLD-RTO: 18.4 % (ref 12.4–15.4)
PERFORMED ON: ABNORMAL
PH UA: 6 (ref 5–8)
PH VENOUS: 7.36 (ref 7.35–7.45)
PLATELET # BLD: 179 K/UL (ref 135–450)
PMV BLD AUTO: 9.2 FL (ref 5–10.5)
PO2, VEN: 29 MM HG
POC SAMPLE TYPE: ABNORMAL
POTASSIUM REFLEX MAGNESIUM: 4.6 MMOL/L (ref 3.5–5.1)
PRO-BNP: 50 PG/ML (ref 0–124)
PROCALCITONIN: 0.05 NG/ML (ref 0–0.15)
PROTEIN UA: NEGATIVE MG/DL
PROTHROMBIN TIME: 33.4 SEC (ref 9.8–13)
RBC # BLD: 3.97 M/UL (ref 4–5.2)
RBC UA: ABNORMAL /HPF (ref 0–2)
SEDIMENTATION RATE, ERYTHROCYTE: 50 MM/HR (ref 0–30)
SODIUM BLD-SCNC: 135 MMOL/L (ref 136–145)
SPECIFIC GRAVITY UA: 1.01 (ref 1–1.03)
TCO2 CALC VENOUS: 35 MMOL/L
TROPONIN: <0.01 NG/ML
URINE TYPE: ABNORMAL
UROBILINOGEN, URINE: 0.2 E.U./DL
WBC # BLD: 5.6 K/UL (ref 4–11)
WBC UA: ABNORMAL /HPF (ref 0–5)

## 2019-11-09 PROCEDURE — 6370000000 HC RX 637 (ALT 250 FOR IP): Performed by: INTERNAL MEDICINE

## 2019-11-09 PROCEDURE — 6370000000 HC RX 637 (ALT 250 FOR IP): Performed by: HOSPITALIST

## 2019-11-09 PROCEDURE — 2580000003 HC RX 258: Performed by: HOSPITALIST

## 2019-11-09 PROCEDURE — 84145 PROCALCITONIN (PCT): CPT

## 2019-11-09 PROCEDURE — 73650 X-RAY EXAM OF HEEL: CPT

## 2019-11-09 PROCEDURE — 6370000000 HC RX 637 (ALT 250 FOR IP): Performed by: STUDENT IN AN ORGANIZED HEALTH CARE EDUCATION/TRAINING PROGRAM

## 2019-11-09 PROCEDURE — 83036 HEMOGLOBIN GLYCOSYLATED A1C: CPT

## 2019-11-09 PROCEDURE — 85652 RBC SED RATE AUTOMATED: CPT

## 2019-11-09 PROCEDURE — 96365 THER/PROPH/DIAG IV INF INIT: CPT

## 2019-11-09 PROCEDURE — 86403 PARTICLE AGGLUT ANTBDY SCRN: CPT

## 2019-11-09 PROCEDURE — 94761 N-INVAS EAR/PLS OXIMETRY MLT: CPT

## 2019-11-09 PROCEDURE — 0JBR0ZZ EXCISION OF LEFT FOOT SUBCUTANEOUS TISSUE AND FASCIA, OPEN APPROACH: ICD-10-PCS | Performed by: PODIATRIST

## 2019-11-09 PROCEDURE — 99285 EMERGENCY DEPT VISIT HI MDM: CPT

## 2019-11-09 PROCEDURE — 83880 ASSAY OF NATRIURETIC PEPTIDE: CPT

## 2019-11-09 PROCEDURE — 81001 URINALYSIS AUTO W/SCOPE: CPT

## 2019-11-09 PROCEDURE — 87186 SC STD MICRODIL/AGAR DIL: CPT

## 2019-11-09 PROCEDURE — 96361 HYDRATE IV INFUSION ADD-ON: CPT

## 2019-11-09 PROCEDURE — 6360000002 HC RX W HCPCS: Performed by: HOSPITALIST

## 2019-11-09 PROCEDURE — 73562 X-RAY EXAM OF KNEE 3: CPT

## 2019-11-09 PROCEDURE — 36415 COLL VENOUS BLD VENIPUNCTURE: CPT

## 2019-11-09 PROCEDURE — 94640 AIRWAY INHALATION TREATMENT: CPT

## 2019-11-09 PROCEDURE — 6360000002 HC RX W HCPCS: Performed by: PHYSICIAN ASSISTANT

## 2019-11-09 PROCEDURE — 82803 BLOOD GASES ANY COMBINATION: CPT

## 2019-11-09 PROCEDURE — 96367 TX/PROPH/DG ADDL SEQ IV INF: CPT

## 2019-11-09 PROCEDURE — 87205 SMEAR GRAM STAIN: CPT

## 2019-11-09 PROCEDURE — 1200000000 HC SEMI PRIVATE

## 2019-11-09 PROCEDURE — 83605 ASSAY OF LACTIC ACID: CPT

## 2019-11-09 PROCEDURE — 6370000000 HC RX 637 (ALT 250 FOR IP): Performed by: PHYSICIAN ASSISTANT

## 2019-11-09 PROCEDURE — 87077 CULTURE AEROBIC IDENTIFY: CPT

## 2019-11-09 PROCEDURE — 87070 CULTURE OTHR SPECIMN AEROBIC: CPT

## 2019-11-09 PROCEDURE — 87040 BLOOD CULTURE FOR BACTERIA: CPT

## 2019-11-09 PROCEDURE — 87086 URINE CULTURE/COLONY COUNT: CPT

## 2019-11-09 PROCEDURE — 71046 X-RAY EXAM CHEST 2 VIEWS: CPT

## 2019-11-09 PROCEDURE — 80048 BASIC METABOLIC PNL TOTAL CA: CPT

## 2019-11-09 PROCEDURE — 85025 COMPLETE CBC W/AUTO DIFF WBC: CPT

## 2019-11-09 PROCEDURE — 94150 VITAL CAPACITY TEST: CPT

## 2019-11-09 PROCEDURE — 84484 ASSAY OF TROPONIN QUANT: CPT

## 2019-11-09 PROCEDURE — 93005 ELECTROCARDIOGRAM TRACING: CPT | Performed by: PHYSICIAN ASSISTANT

## 2019-11-09 PROCEDURE — 2580000003 HC RX 258: Performed by: PHYSICIAN ASSISTANT

## 2019-11-09 PROCEDURE — 86140 C-REACTIVE PROTEIN: CPT

## 2019-11-09 PROCEDURE — 85610 PROTHROMBIN TIME: CPT

## 2019-11-09 RX ORDER — FUROSEMIDE 10 MG/ML
20 INJECTION INTRAMUSCULAR; INTRAVENOUS EVERY 12 HOURS
Status: DISCONTINUED | OUTPATIENT
Start: 2019-11-09 | End: 2019-11-12

## 2019-11-09 RX ORDER — SODIUM CHLORIDE 0.9 % (FLUSH) 0.9 %
10 SYRINGE (ML) INJECTION PRN
Status: DISCONTINUED | OUTPATIENT
Start: 2019-11-09 | End: 2019-11-13 | Stop reason: HOSPADM

## 2019-11-09 RX ORDER — IPRATROPIUM BROMIDE AND ALBUTEROL SULFATE 2.5; .5 MG/3ML; MG/3ML
1 SOLUTION RESPIRATORY (INHALATION) ONCE
Status: COMPLETED | OUTPATIENT
Start: 2019-11-09 | End: 2019-11-09

## 2019-11-09 RX ORDER — VALPROIC ACID 250 MG/5ML
500 SOLUTION ORAL 3 TIMES DAILY
Status: DISCONTINUED | OUTPATIENT
Start: 2019-11-09 | End: 2019-11-12

## 2019-11-09 RX ORDER — ATORVASTATIN CALCIUM 10 MG/1
10 TABLET, FILM COATED ORAL NIGHTLY
Status: DISCONTINUED | OUTPATIENT
Start: 2019-11-09 | End: 2019-11-13 | Stop reason: HOSPADM

## 2019-11-09 RX ORDER — DEXTROMETHORPHAN HYDROBROMIDE AND PROMETHAZINE HYDROCHLORIDE 15; 6.25 MG/5ML; MG/5ML
SYRUP ORAL 4 TIMES DAILY PRN
COMMUNITY

## 2019-11-09 RX ORDER — CLOZAPINE 100 MG/1
200 TABLET ORAL NIGHTLY
Status: ON HOLD | COMMUNITY
End: 2019-11-09 | Stop reason: CLARIF

## 2019-11-09 RX ORDER — PANTOPRAZOLE SODIUM 40 MG/1
40 TABLET, DELAYED RELEASE ORAL
Status: DISCONTINUED | OUTPATIENT
Start: 2019-11-10 | End: 2019-11-13 | Stop reason: HOSPADM

## 2019-11-09 RX ORDER — LISINOPRIL 2.5 MG/1
2.5 TABLET ORAL DAILY
Status: DISCONTINUED | OUTPATIENT
Start: 2019-11-10 | End: 2019-11-11

## 2019-11-09 RX ORDER — DEXTROSE MONOHYDRATE 25 G/50ML
12.5 INJECTION, SOLUTION INTRAVENOUS PRN
Status: DISCONTINUED | OUTPATIENT
Start: 2019-11-09 | End: 2019-11-09 | Stop reason: SDUPTHER

## 2019-11-09 RX ORDER — GABAPENTIN 300 MG/1
300 CAPSULE ORAL 3 TIMES DAILY
Status: DISCONTINUED | OUTPATIENT
Start: 2019-11-09 | End: 2019-11-10

## 2019-11-09 RX ORDER — 0.9 % SODIUM CHLORIDE 0.9 %
1000 INTRAVENOUS SOLUTION INTRAVENOUS ONCE
Status: COMPLETED | OUTPATIENT
Start: 2019-11-09 | End: 2019-11-09

## 2019-11-09 RX ORDER — DEXTROSE MONOHYDRATE 25 G/50ML
12.5 INJECTION, SOLUTION INTRAVENOUS PRN
Status: DISCONTINUED | OUTPATIENT
Start: 2019-11-09 | End: 2019-11-13 | Stop reason: HOSPADM

## 2019-11-09 RX ORDER — VALPROIC ACID 250 MG/5ML
500 SOLUTION ORAL EVERY 8 HOURS SCHEDULED
Status: ON HOLD | COMMUNITY
End: 2019-11-13 | Stop reason: HOSPADM

## 2019-11-09 RX ORDER — FUROSEMIDE 20 MG/1
20 TABLET ORAL 2 TIMES DAILY
Status: DISCONTINUED | OUTPATIENT
Start: 2019-11-09 | End: 2019-11-09

## 2019-11-09 RX ORDER — NICOTINE POLACRILEX 4 MG
15 LOZENGE BUCCAL PRN
Status: DISCONTINUED | OUTPATIENT
Start: 2019-11-09 | End: 2019-11-13 | Stop reason: HOSPADM

## 2019-11-09 RX ORDER — CLOZAPINE 100 MG/1
100 TABLET ORAL 2 TIMES DAILY
Status: DISCONTINUED | OUTPATIENT
Start: 2019-11-09 | End: 2019-11-13 | Stop reason: HOSPADM

## 2019-11-09 RX ORDER — DIPHENHYDRAMINE HCL 25 MG
25 CAPSULE ORAL EVERY 6 HOURS PRN
Status: DISCONTINUED | OUTPATIENT
Start: 2019-11-09 | End: 2019-11-09

## 2019-11-09 RX ORDER — BUDESONIDE 0.5 MG/2ML
0.5 INHALANT ORAL 2 TIMES DAILY
Status: DISCONTINUED | OUTPATIENT
Start: 2019-11-09 | End: 2019-11-13 | Stop reason: HOSPADM

## 2019-11-09 RX ORDER — M-VIT,TX,IRON,MINS/CALC/FOLIC 27MG-0.4MG
1 TABLET ORAL DAILY
Status: DISCONTINUED | OUTPATIENT
Start: 2019-11-10 | End: 2019-11-13 | Stop reason: HOSPADM

## 2019-11-09 RX ORDER — GABAPENTIN 300 MG/1
300 CAPSULE ORAL 3 TIMES DAILY
Status: ON HOLD | COMMUNITY
End: 2019-11-13 | Stop reason: HOSPADM

## 2019-11-09 RX ORDER — SPIRONOLACTONE 25 MG/1
25 TABLET ORAL DAILY
Status: DISCONTINUED | OUTPATIENT
Start: 2019-11-10 | End: 2019-11-11

## 2019-11-09 RX ORDER — LISINOPRIL 5 MG/1
5 TABLET ORAL DAILY
Status: DISCONTINUED | OUTPATIENT
Start: 2019-11-09 | End: 2019-11-09

## 2019-11-09 RX ORDER — LANOLIN ALCOHOL/MO/W.PET/CERES
1000 CREAM (GRAM) TOPICAL DAILY
Status: DISCONTINUED | OUTPATIENT
Start: 2019-11-10 | End: 2019-11-13 | Stop reason: HOSPADM

## 2019-11-09 RX ORDER — LEVOTHYROXINE SODIUM 0.03 MG/1
25 TABLET ORAL DAILY
Status: DISCONTINUED | OUTPATIENT
Start: 2019-11-10 | End: 2019-11-11

## 2019-11-09 RX ORDER — DIPHENHYDRAMINE HCL 25 MG
25 TABLET ORAL EVERY 6 HOURS
Status: DISCONTINUED | OUTPATIENT
Start: 2019-11-09 | End: 2019-11-11

## 2019-11-09 RX ORDER — SODIUM CHLORIDE 0.9 % (FLUSH) 0.9 %
10 SYRINGE (ML) INJECTION EVERY 12 HOURS SCHEDULED
Status: DISCONTINUED | OUTPATIENT
Start: 2019-11-09 | End: 2019-11-13 | Stop reason: HOSPADM

## 2019-11-09 RX ORDER — WARFARIN SODIUM 3 MG/1
3 TABLET ORAL DAILY
Status: DISCONTINUED | OUTPATIENT
Start: 2019-11-09 | End: 2019-11-10 | Stop reason: DRUGHIGH

## 2019-11-09 RX ORDER — FORMOTEROL FUMARATE 20 UG/2ML
20 SOLUTION RESPIRATORY (INHALATION) 2 TIMES DAILY
Status: DISCONTINUED | OUTPATIENT
Start: 2019-11-09 | End: 2019-11-13 | Stop reason: HOSPADM

## 2019-11-09 RX ORDER — WARFARIN SODIUM 3 MG/1
3 TABLET ORAL EVERY EVENING
COMMUNITY

## 2019-11-09 RX ORDER — VALPROIC ACID 250 MG/5ML
500 SOLUTION ORAL 3 TIMES DAILY
Status: DISCONTINUED | OUTPATIENT
Start: 2019-11-09 | End: 2019-11-09 | Stop reason: CLARIF

## 2019-11-09 RX ORDER — GLIPIZIDE 10 MG/1
10 TABLET ORAL
Status: ON HOLD | COMMUNITY
End: 2019-11-13 | Stop reason: HOSPADM

## 2019-11-09 RX ORDER — DEXTROSE MONOHYDRATE 50 MG/ML
100 INJECTION, SOLUTION INTRAVENOUS PRN
Status: DISCONTINUED | OUTPATIENT
Start: 2019-11-09 | End: 2019-11-13 | Stop reason: HOSPADM

## 2019-11-09 RX ORDER — GLIMEPIRIDE 2 MG/1
2 TABLET ORAL
Status: ON HOLD | COMMUNITY
End: 2019-11-09 | Stop reason: CLARIF

## 2019-11-09 RX ORDER — NICOTINE POLACRILEX 4 MG
15 LOZENGE BUCCAL PRN
Status: DISCONTINUED | OUTPATIENT
Start: 2019-11-09 | End: 2019-11-09 | Stop reason: SDUPTHER

## 2019-11-09 RX ORDER — VALPROIC ACID 250 MG/1
250 CAPSULE, LIQUID FILLED ORAL 3 TIMES DAILY
Status: ON HOLD | COMMUNITY
End: 2019-11-09 | Stop reason: CLARIF

## 2019-11-09 RX ORDER — ARIPIPRAZOLE 5 MG/1
15 TABLET ORAL NIGHTLY
Status: DISCONTINUED | OUTPATIENT
Start: 2019-11-09 | End: 2019-11-13 | Stop reason: HOSPADM

## 2019-11-09 RX ORDER — VITAMIN B COMPLEX
2000 TABLET ORAL NIGHTLY
Status: DISCONTINUED | OUTPATIENT
Start: 2019-11-09 | End: 2019-11-13 | Stop reason: HOSPADM

## 2019-11-09 RX ORDER — INSULIN LISPRO 100 [IU]/ML
0-3 INJECTION, SOLUTION INTRAVENOUS; SUBCUTANEOUS NIGHTLY
Status: DISCONTINUED | OUTPATIENT
Start: 2019-11-09 | End: 2019-11-13 | Stop reason: HOSPADM

## 2019-11-09 RX ORDER — INSULIN LISPRO 100 [IU]/ML
0-6 INJECTION, SOLUTION INTRAVENOUS; SUBCUTANEOUS
Status: DISCONTINUED | OUTPATIENT
Start: 2019-11-09 | End: 2019-11-13 | Stop reason: HOSPADM

## 2019-11-09 RX ORDER — GABAPENTIN 100 MG/1
300 CAPSULE ORAL 3 TIMES DAILY
Status: ON HOLD | COMMUNITY
End: 2019-11-09 | Stop reason: CLARIF

## 2019-11-09 RX ORDER — AMMONIUM LACTATE 12 G/100G
LOTION TOPICAL DAILY
Status: DISCONTINUED | OUTPATIENT
Start: 2019-11-09 | End: 2019-11-13 | Stop reason: HOSPADM

## 2019-11-09 RX ORDER — DEXTROSE MONOHYDRATE 50 MG/ML
100 INJECTION, SOLUTION INTRAVENOUS PRN
Status: DISCONTINUED | OUTPATIENT
Start: 2019-11-09 | End: 2019-11-09 | Stop reason: SDUPTHER

## 2019-11-09 RX ORDER — ONDANSETRON 2 MG/ML
4 INJECTION INTRAMUSCULAR; INTRAVENOUS EVERY 6 HOURS PRN
Status: DISCONTINUED | OUTPATIENT
Start: 2019-11-09 | End: 2019-11-13 | Stop reason: HOSPADM

## 2019-11-09 RX ORDER — ACETAMINOPHEN 325 MG/1
650 TABLET ORAL EVERY 4 HOURS PRN
Status: DISCONTINUED | OUTPATIENT
Start: 2019-11-09 | End: 2019-11-13 | Stop reason: HOSPADM

## 2019-11-09 RX ORDER — FERROUS SULFATE 325(65) MG
325 TABLET ORAL
Status: DISCONTINUED | OUTPATIENT
Start: 2019-11-10 | End: 2019-11-13 | Stop reason: HOSPADM

## 2019-11-09 RX ADMIN — DIPHENHYDRAMINE HYDROCHLORIDE 25 MG: 25 TABLET ORAL at 23:05

## 2019-11-09 RX ADMIN — SODIUM CHLORIDE 1000 ML: 9 INJECTION, SOLUTION INTRAVENOUS at 12:59

## 2019-11-09 RX ADMIN — ARIPIPRAZOLE 15 MG: 5 TABLET ORAL at 22:55

## 2019-11-09 RX ADMIN — INSULIN LISPRO 3 UNITS: 100 INJECTION, SOLUTION INTRAVENOUS; SUBCUTANEOUS at 22:57

## 2019-11-09 RX ADMIN — MELATONIN 2000 UNITS: at 22:55

## 2019-11-09 RX ADMIN — IPRATROPIUM BROMIDE AND ALBUTEROL SULFATE 1 AMPULE: .5; 3 SOLUTION RESPIRATORY (INHALATION) at 10:42

## 2019-11-09 RX ADMIN — ATORVASTATIN CALCIUM 10 MG: 10 TABLET, FILM COATED ORAL at 22:55

## 2019-11-09 RX ADMIN — Medication 10 ML: at 23:01

## 2019-11-09 RX ADMIN — AMMONIUM LACTATE: 12 LOTION TOPICAL at 16:52

## 2019-11-09 RX ADMIN — BUDESONIDE 500 MCG: 0.5 SUSPENSION RESPIRATORY (INHALATION) at 22:35

## 2019-11-09 RX ADMIN — INSULIN GLARGINE 23 UNITS: 100 INJECTION, SOLUTION SUBCUTANEOUS at 22:56

## 2019-11-09 RX ADMIN — CLOZAPINE 100 MG: 100 TABLET ORAL at 22:56

## 2019-11-09 RX ADMIN — ACETAMINOPHEN 650 MG: 325 TABLET ORAL at 23:19

## 2019-11-09 RX ADMIN — CEFTRIAXONE 1 G: 1 INJECTION, POWDER, FOR SOLUTION INTRAMUSCULAR; INTRAVENOUS at 11:37

## 2019-11-09 RX ADMIN — FORMOTEROL FUMARATE DIHYDRATE 20 MCG: 20 SOLUTION RESPIRATORY (INHALATION) at 22:35

## 2019-11-09 RX ADMIN — WARFARIN SODIUM 3 MG: 3 TABLET ORAL at 20:03

## 2019-11-09 RX ADMIN — VALPROIC ACID 500 MG: 250 SOLUTION ORAL at 20:04

## 2019-11-09 RX ADMIN — SODIUM CHLORIDE 1000 ML: 9 INJECTION, SOLUTION INTRAVENOUS at 11:37

## 2019-11-09 RX ADMIN — AZITHROMYCIN 500 MG: 500 INJECTION, POWDER, LYOPHILIZED, FOR SOLUTION INTRAVENOUS at 12:29

## 2019-11-09 ASSESSMENT — PAIN DESCRIPTION - PROGRESSION: CLINICAL_PROGRESSION: NOT CHANGED

## 2019-11-09 ASSESSMENT — ENCOUNTER SYMPTOMS
BACK PAIN: 0
EYE REDNESS: 0
CONSTIPATION: 0
TROUBLE SWALLOWING: 0
SHORTNESS OF BREATH: 0
VOICE CHANGE: 0
NAUSEA: 0
EYE DISCHARGE: 0
ABDOMINAL PAIN: 0
COUGH: 0
COLOR CHANGE: 1
DIARRHEA: 0
CHEST TIGHTNESS: 0
SORE THROAT: 0
VOMITING: 0
WHEEZING: 1

## 2019-11-09 ASSESSMENT — PAIN SCALES - GENERAL
PAINLEVEL_OUTOF10: 10
PAINLEVEL_OUTOF10: 10
PAINLEVEL_OUTOF10: 0

## 2019-11-09 ASSESSMENT — PAIN DESCRIPTION - FREQUENCY
FREQUENCY: CONTINUOUS
FREQUENCY: CONTINUOUS

## 2019-11-09 ASSESSMENT — PAIN DESCRIPTION - PAIN TYPE
TYPE: CHRONIC PAIN
TYPE: CHRONIC PAIN

## 2019-11-09 ASSESSMENT — PAIN DESCRIPTION - DESCRIPTORS
DESCRIPTORS: BURNING
DESCRIPTORS: ACHING

## 2019-11-09 ASSESSMENT — PAIN DESCRIPTION - ONSET: ONSET: ON-GOING

## 2019-11-09 ASSESSMENT — PAIN - FUNCTIONAL ASSESSMENT: PAIN_FUNCTIONAL_ASSESSMENT: PREVENTS OR INTERFERES SOME ACTIVE ACTIVITIES AND ADLS

## 2019-11-09 ASSESSMENT — PAIN DESCRIPTION - LOCATION: LOCATION: FOOT

## 2019-11-09 ASSESSMENT — PAIN DESCRIPTION - ORIENTATION: ORIENTATION: RIGHT;LEFT

## 2019-11-10 ENCOUNTER — APPOINTMENT (OUTPATIENT)
Dept: CT IMAGING | Age: 64
DRG: 383 | End: 2019-11-10
Payer: COMMERCIAL

## 2019-11-10 LAB
ALBUMIN SERPL-MCNC: 3.4 G/DL (ref 3.4–5)
ALP BLD-CCNC: 148 U/L (ref 40–129)
ALT SERPL-CCNC: 22 U/L (ref 10–40)
ANION GAP SERPL CALCULATED.3IONS-SCNC: 9 MMOL/L (ref 3–16)
AST SERPL-CCNC: 16 U/L (ref 15–37)
BILIRUB SERPL-MCNC: <0.2 MG/DL (ref 0–1)
BILIRUBIN DIRECT: <0.2 MG/DL (ref 0–0.3)
BILIRUBIN, INDIRECT: ABNORMAL MG/DL (ref 0–1)
BUN BLDV-MCNC: 18 MG/DL (ref 7–20)
CALCIUM SERPL-MCNC: 9 MG/DL (ref 8.3–10.6)
CHLORIDE BLD-SCNC: 102 MMOL/L (ref 99–110)
CO2: 29 MMOL/L (ref 21–32)
CREAT SERPL-MCNC: 0.6 MG/DL (ref 0.6–1.2)
ESTIMATED AVERAGE GLUCOSE: 269 MG/DL
GFR AFRICAN AMERICAN: >60
GFR NON-AFRICAN AMERICAN: >60
GLUCOSE BLD-MCNC: 101 MG/DL (ref 70–99)
GLUCOSE BLD-MCNC: 113 MG/DL (ref 70–99)
GLUCOSE BLD-MCNC: 128 MG/DL (ref 70–99)
GLUCOSE BLD-MCNC: 142 MG/DL (ref 70–99)
GLUCOSE BLD-MCNC: 204 MG/DL (ref 70–99)
HBA1C MFR BLD: 11 %
HCT VFR BLD CALC: 32.1 % (ref 36–48)
HEMOGLOBIN: 10.7 G/DL (ref 12–16)
INR BLD: 3.36 (ref 0.86–1.14)
LACTIC ACID, SEPSIS: 1 MMOL/L (ref 0.4–1.9)
LACTIC ACID, SEPSIS: 1 MMOL/L (ref 0.4–1.9)
MCH RBC QN AUTO: 29.7 PG (ref 26–34)
MCHC RBC AUTO-ENTMCNC: 33.5 G/DL (ref 31–36)
MCV RBC AUTO: 88.7 FL (ref 80–100)
PDW BLD-RTO: 18.1 % (ref 12.4–15.4)
PERFORMED ON: ABNORMAL
PLATELET # BLD: 185 K/UL (ref 135–450)
PMV BLD AUTO: 9.3 FL (ref 5–10.5)
POTASSIUM REFLEX MAGNESIUM: 5 MMOL/L (ref 3.5–5.1)
PROTHROMBIN TIME: 38.3 SEC (ref 9.8–13)
RBC # BLD: 3.62 M/UL (ref 4–5.2)
SODIUM BLD-SCNC: 140 MMOL/L (ref 136–145)
T4 FREE: 1.2 NG/DL (ref 0.9–1.8)
TOTAL PROTEIN: 5.9 G/DL (ref 6.4–8.2)
TSH SERPL DL<=0.05 MIU/L-ACNC: 16.07 UIU/ML (ref 0.27–4.2)
URINE CULTURE, ROUTINE: NORMAL
WBC # BLD: 5.8 K/UL (ref 4–11)

## 2019-11-10 PROCEDURE — 83605 ASSAY OF LACTIC ACID: CPT

## 2019-11-10 PROCEDURE — 6360000002 HC RX W HCPCS: Performed by: INTERNAL MEDICINE

## 2019-11-10 PROCEDURE — 6370000000 HC RX 637 (ALT 250 FOR IP): Performed by: INTERNAL MEDICINE

## 2019-11-10 PROCEDURE — 6360000004 HC RX CONTRAST MEDICATION: Performed by: STUDENT IN AN ORGANIZED HEALTH CARE EDUCATION/TRAINING PROGRAM

## 2019-11-10 PROCEDURE — 2580000003 HC RX 258: Performed by: INTERNAL MEDICINE

## 2019-11-10 PROCEDURE — 6360000002 HC RX W HCPCS: Performed by: HOSPITALIST

## 2019-11-10 PROCEDURE — 93005 ELECTROCARDIOGRAM TRACING: CPT

## 2019-11-10 PROCEDURE — 36415 COLL VENOUS BLD VENIPUNCTURE: CPT

## 2019-11-10 PROCEDURE — 80076 HEPATIC FUNCTION PANEL: CPT

## 2019-11-10 PROCEDURE — 94761 N-INVAS EAR/PLS OXIMETRY MLT: CPT

## 2019-11-10 PROCEDURE — 85027 COMPLETE CBC AUTOMATED: CPT

## 2019-11-10 PROCEDURE — 80048 BASIC METABOLIC PNL TOTAL CA: CPT

## 2019-11-10 PROCEDURE — 87040 BLOOD CULTURE FOR BACTERIA: CPT

## 2019-11-10 PROCEDURE — 6370000000 HC RX 637 (ALT 250 FOR IP): Performed by: HOSPITALIST

## 2019-11-10 PROCEDURE — 84439 ASSAY OF FREE THYROXINE: CPT

## 2019-11-10 PROCEDURE — 2580000003 HC RX 258: Performed by: HOSPITALIST

## 2019-11-10 PROCEDURE — 1200000000 HC SEMI PRIVATE

## 2019-11-10 PROCEDURE — 94640 AIRWAY INHALATION TREATMENT: CPT

## 2019-11-10 PROCEDURE — 70498 CT ANGIOGRAPHY NECK: CPT

## 2019-11-10 PROCEDURE — 85610 PROTHROMBIN TIME: CPT

## 2019-11-10 PROCEDURE — 70496 CT ANGIOGRAPHY HEAD: CPT

## 2019-11-10 PROCEDURE — 84443 ASSAY THYROID STIM HORMONE: CPT

## 2019-11-10 PROCEDURE — 70450 CT HEAD/BRAIN W/O DYE: CPT

## 2019-11-10 RX ORDER — SODIUM CHLORIDE 0.9 % (FLUSH) 0.9 %
10 SYRINGE (ML) INJECTION EVERY 12 HOURS SCHEDULED
Status: DISCONTINUED | OUTPATIENT
Start: 2019-11-10 | End: 2019-11-13 | Stop reason: HOSPADM

## 2019-11-10 RX ORDER — SODIUM CHLORIDE, SODIUM LACTATE, POTASSIUM CHLORIDE, AND CALCIUM CHLORIDE .6; .31; .03; .02 G/100ML; G/100ML; G/100ML; G/100ML
1000 INJECTION, SOLUTION INTRAVENOUS ONCE
Status: COMPLETED | OUTPATIENT
Start: 2019-11-10 | End: 2019-11-10

## 2019-11-10 RX ORDER — SODIUM CHLORIDE 0.9 % (FLUSH) 0.9 %
10 SYRINGE (ML) INJECTION PRN
Status: DISCONTINUED | OUTPATIENT
Start: 2019-11-10 | End: 2019-11-13 | Stop reason: HOSPADM

## 2019-11-10 RX ADMIN — AMMONIUM LACTATE: 12 LOTION TOPICAL at 09:20

## 2019-11-10 RX ADMIN — SODIUM CHLORIDE, POTASSIUM CHLORIDE, SODIUM LACTATE AND CALCIUM CHLORIDE 1000 ML: 600; 310; 30; 20 INJECTION, SOLUTION INTRAVENOUS at 02:23

## 2019-11-10 RX ADMIN — MELATONIN 2000 UNITS: at 21:16

## 2019-11-10 RX ADMIN — VANCOMYCIN HYDROCHLORIDE 1250 MG: 10 INJECTION, POWDER, LYOPHILIZED, FOR SOLUTION INTRAVENOUS at 21:16

## 2019-11-10 RX ADMIN — CEFTRIAXONE 1 G: 1 INJECTION, POWDER, FOR SOLUTION INTRAMUSCULAR; INTRAVENOUS at 13:36

## 2019-11-10 RX ADMIN — BUDESONIDE 500 MCG: 0.5 SUSPENSION RESPIRATORY (INHALATION) at 10:40

## 2019-11-10 RX ADMIN — PANTOPRAZOLE SODIUM 40 MG: 40 TABLET, DELAYED RELEASE ORAL at 07:16

## 2019-11-10 RX ADMIN — INSULIN LISPRO 2 UNITS: 100 INJECTION, SOLUTION INTRAVENOUS; SUBCUTANEOUS at 13:37

## 2019-11-10 RX ADMIN — IOPAMIDOL 80 ML: 755 INJECTION, SOLUTION INTRAVENOUS at 18:00

## 2019-11-10 RX ADMIN — DIPHENHYDRAMINE HYDROCHLORIDE 25 MG: 25 TABLET ORAL at 09:17

## 2019-11-10 RX ADMIN — ATORVASTATIN CALCIUM 10 MG: 10 TABLET, FILM COATED ORAL at 21:16

## 2019-11-10 RX ADMIN — FERROUS SULFATE TAB 325 MG (65 MG ELEMENTAL FE) 325 MG: 325 (65 FE) TAB at 09:17

## 2019-11-10 RX ADMIN — INSULIN GLARGINE 23 UNITS: 100 INJECTION, SOLUTION SUBCUTANEOUS at 21:56

## 2019-11-10 RX ADMIN — FORMOTEROL FUMARATE DIHYDRATE 20 MCG: 20 SOLUTION RESPIRATORY (INHALATION) at 10:40

## 2019-11-10 RX ADMIN — CLOZAPINE 100 MG: 100 TABLET ORAL at 21:44

## 2019-11-10 RX ADMIN — VALPROIC ACID 500 MG: 250 SOLUTION ORAL at 09:17

## 2019-11-10 RX ADMIN — VALPROIC ACID 500 MG: 250 SOLUTION ORAL at 13:32

## 2019-11-10 RX ADMIN — CYANOCOBALAMIN TAB 1000 MCG 1000 MCG: 1000 TAB at 09:17

## 2019-11-10 RX ADMIN — LEVOTHYROXINE SODIUM 25 MCG: 25 TABLET ORAL at 07:16

## 2019-11-10 RX ADMIN — Medication 10 ML: at 09:19

## 2019-11-10 RX ADMIN — MULTIPLE VITAMINS W/ MINERALS TAB 1 TABLET: TAB at 09:17

## 2019-11-10 RX ADMIN — BUDESONIDE 500 MCG: 0.5 SUSPENSION RESPIRATORY (INHALATION) at 23:40

## 2019-11-10 RX ADMIN — Medication 10 ML: at 21:35

## 2019-11-10 RX ADMIN — CLOZAPINE 100 MG: 100 TABLET ORAL at 09:17

## 2019-11-10 RX ADMIN — VALPROIC ACID 500 MG: 250 SOLUTION ORAL at 21:16

## 2019-11-10 RX ADMIN — ARIPIPRAZOLE 15 MG: 5 TABLET ORAL at 21:16

## 2019-11-10 RX ADMIN — GABAPENTIN 300 MG: 300 CAPSULE ORAL at 09:17

## 2019-11-10 RX ADMIN — FORMOTEROL FUMARATE DIHYDRATE 20 MCG: 20 SOLUTION RESPIRATORY (INHALATION) at 23:34

## 2019-11-10 ASSESSMENT — PAIN SCALES - GENERAL
PAINLEVEL_OUTOF10: 0

## 2019-11-11 PROBLEM — L02.612 ABSCESS OF LEFT FOOT: Status: ACTIVE | Noted: 2019-11-11

## 2019-11-11 PROBLEM — A49.02 MRSA INFECTION: Status: ACTIVE | Noted: 2019-11-11

## 2019-11-11 LAB
ALBUMIN SERPL-MCNC: 3.2 G/DL (ref 3.4–5)
ALP BLD-CCNC: 148 U/L (ref 40–129)
ALT SERPL-CCNC: 21 U/L (ref 10–40)
AMMONIA: 73 UMOL/L (ref 11–51)
ANION GAP SERPL CALCULATED.3IONS-SCNC: 10 MMOL/L (ref 3–16)
AST SERPL-CCNC: 20 U/L (ref 15–37)
BASOPHILS ABSOLUTE: 0 K/UL (ref 0–0.2)
BASOPHILS RELATIVE PERCENT: 0.4 %
BILIRUB SERPL-MCNC: <0.2 MG/DL (ref 0–1)
BILIRUBIN DIRECT: <0.2 MG/DL (ref 0–0.3)
BILIRUBIN, INDIRECT: ABNORMAL MG/DL (ref 0–1)
BUN BLDV-MCNC: 14 MG/DL (ref 7–20)
CALCIUM SERPL-MCNC: 9.2 MG/DL (ref 8.3–10.6)
CHLORIDE BLD-SCNC: 98 MMOL/L (ref 99–110)
CO2: 29 MMOL/L (ref 21–32)
CREAT SERPL-MCNC: 0.8 MG/DL (ref 0.6–1.2)
EKG ATRIAL RATE: 93 BPM
EKG DIAGNOSIS: NORMAL
EKG P AXIS: 51 DEGREES
EKG P-R INTERVAL: 144 MS
EKG Q-T INTERVAL: 398 MS
EKG QRS DURATION: 88 MS
EKG QTC CALCULATION (BAZETT): 494 MS
EKG R AXIS: -62 DEGREES
EKG T AXIS: 61 DEGREES
EKG VENTRICULAR RATE: 93 BPM
EOSINOPHILS ABSOLUTE: 0.1 K/UL (ref 0–0.6)
EOSINOPHILS RELATIVE PERCENT: 1.3 %
GFR AFRICAN AMERICAN: >60
GFR NON-AFRICAN AMERICAN: >60
GLUCOSE BLD-MCNC: 149 MG/DL (ref 70–99)
GLUCOSE BLD-MCNC: 165 MG/DL (ref 70–99)
GLUCOSE BLD-MCNC: 229 MG/DL (ref 70–99)
GLUCOSE BLD-MCNC: 240 MG/DL (ref 70–99)
GLUCOSE BLD-MCNC: 327 MG/DL (ref 70–99)
GRAM STAIN RESULT: ABNORMAL
HCT VFR BLD CALC: 35.5 % (ref 36–48)
HEMOGLOBIN: 11.5 G/DL (ref 12–16)
INR BLD: 2.49 (ref 0.86–1.14)
LYMPHOCYTES ABSOLUTE: 1.6 K/UL (ref 1–5.1)
LYMPHOCYTES RELATIVE PERCENT: 28.5 %
MCH RBC QN AUTO: 29.3 PG (ref 26–34)
MCHC RBC AUTO-ENTMCNC: 32.3 G/DL (ref 31–36)
MCV RBC AUTO: 90.7 FL (ref 80–100)
MONOCYTES ABSOLUTE: 0.5 K/UL (ref 0–1.3)
MONOCYTES RELATIVE PERCENT: 8.8 %
NEUTROPHILS ABSOLUTE: 3.4 K/UL (ref 1.7–7.7)
NEUTROPHILS RELATIVE PERCENT: 61 %
ORGANISM: ABNORMAL
PDW BLD-RTO: 19 % (ref 12.4–15.4)
PERFORMED ON: ABNORMAL
PLATELET # BLD: 184 K/UL (ref 135–450)
PMV BLD AUTO: 9.4 FL (ref 5–10.5)
POTASSIUM REFLEX MAGNESIUM: 5 MMOL/L (ref 3.5–5.1)
PROTHROMBIN TIME: 28.4 SEC (ref 9.8–13)
RBC # BLD: 3.92 M/UL (ref 4–5.2)
SODIUM BLD-SCNC: 137 MMOL/L (ref 136–145)
TOTAL PROTEIN: 6.1 G/DL (ref 6.4–8.2)
VITAMIN B-12: 1478 PG/ML (ref 211–911)
WBC # BLD: 5.6 K/UL (ref 4–11)
WOUND/ABSCESS: ABNORMAL

## 2019-11-11 PROCEDURE — 80164 ASSAY DIPROPYLACETIC ACD TOT: CPT

## 2019-11-11 PROCEDURE — 80165 DIPROPYLACETIC ACID FREE: CPT

## 2019-11-11 PROCEDURE — 94640 AIRWAY INHALATION TREATMENT: CPT

## 2019-11-11 PROCEDURE — 1200000000 HC SEMI PRIVATE

## 2019-11-11 PROCEDURE — 6370000000 HC RX 637 (ALT 250 FOR IP): Performed by: INTERNAL MEDICINE

## 2019-11-11 PROCEDURE — 6370000000 HC RX 637 (ALT 250 FOR IP): Performed by: HOSPITALIST

## 2019-11-11 PROCEDURE — 99255 IP/OBS CONSLTJ NEW/EST HI 80: CPT | Performed by: INTERNAL MEDICINE

## 2019-11-11 PROCEDURE — 36415 COLL VENOUS BLD VENIPUNCTURE: CPT

## 2019-11-11 PROCEDURE — 97162 PT EVAL MOD COMPLEX 30 MIN: CPT

## 2019-11-11 PROCEDURE — 85610 PROTHROMBIN TIME: CPT

## 2019-11-11 PROCEDURE — 80076 HEPATIC FUNCTION PANEL: CPT

## 2019-11-11 PROCEDURE — 6370000000 HC RX 637 (ALT 250 FOR IP): Performed by: STUDENT IN AN ORGANIZED HEALTH CARE EDUCATION/TRAINING PROGRAM

## 2019-11-11 PROCEDURE — 93010 ELECTROCARDIOGRAM REPORT: CPT | Performed by: INTERNAL MEDICINE

## 2019-11-11 PROCEDURE — 97530 THERAPEUTIC ACTIVITIES: CPT

## 2019-11-11 PROCEDURE — 6360000002 HC RX W HCPCS: Performed by: INTERNAL MEDICINE

## 2019-11-11 PROCEDURE — 6360000002 HC RX W HCPCS: Performed by: HOSPITALIST

## 2019-11-11 PROCEDURE — 85025 COMPLETE CBC W/AUTO DIFF WBC: CPT

## 2019-11-11 PROCEDURE — 82607 VITAMIN B-12: CPT

## 2019-11-11 PROCEDURE — 2580000003 HC RX 258: Performed by: INTERNAL MEDICINE

## 2019-11-11 PROCEDURE — 97166 OT EVAL MOD COMPLEX 45 MIN: CPT

## 2019-11-11 PROCEDURE — 82140 ASSAY OF AMMONIA: CPT

## 2019-11-11 PROCEDURE — 2580000003 HC RX 258: Performed by: HOSPITALIST

## 2019-11-11 PROCEDURE — 80048 BASIC METABOLIC PNL TOTAL CA: CPT

## 2019-11-11 RX ORDER — WARFARIN SODIUM 3 MG/1
3 TABLET ORAL DAILY
Status: DISCONTINUED | OUTPATIENT
Start: 2019-11-11 | End: 2019-11-13 | Stop reason: HOSPADM

## 2019-11-11 RX ORDER — LEVOTHYROXINE SODIUM 0.07 MG/1
37.5 TABLET ORAL DAILY
Status: DISCONTINUED | OUTPATIENT
Start: 2019-11-12 | End: 2019-11-13 | Stop reason: HOSPADM

## 2019-11-11 RX ORDER — FUROSEMIDE 10 MG/ML
INJECTION INTRAMUSCULAR; INTRAVENOUS
Status: DISPENSED
Start: 2019-11-11 | End: 2019-11-11

## 2019-11-11 RX ADMIN — LISINOPRIL 2.5 MG: 2.5 TABLET ORAL at 09:58

## 2019-11-11 RX ADMIN — SPIRONOLACTONE 25 MG: 25 TABLET, FILM COATED ORAL at 09:59

## 2019-11-11 RX ADMIN — FERROUS SULFATE TAB 325 MG (65 MG ELEMENTAL FE) 325 MG: 325 (65 FE) TAB at 09:59

## 2019-11-11 RX ADMIN — VALPROIC ACID 500 MG: 250 SOLUTION ORAL at 10:02

## 2019-11-11 RX ADMIN — VANCOMYCIN HYDROCHLORIDE 1250 MG: 10 INJECTION, POWDER, LYOPHILIZED, FOR SOLUTION INTRAVENOUS at 04:09

## 2019-11-11 RX ADMIN — DIPHENHYDRAMINE HYDROCHLORIDE 25 MG: 25 TABLET ORAL at 09:59

## 2019-11-11 RX ADMIN — VALPROIC ACID 500 MG: 250 SOLUTION ORAL at 14:38

## 2019-11-11 RX ADMIN — MELATONIN 2000 UNITS: at 21:38

## 2019-11-11 RX ADMIN — LEVOTHYROXINE SODIUM 25 MCG: 25 TABLET ORAL at 07:12

## 2019-11-11 RX ADMIN — INSULIN LISPRO 2 UNITS: 100 INJECTION, SOLUTION INTRAVENOUS; SUBCUTANEOUS at 12:00

## 2019-11-11 RX ADMIN — Medication 10 ML: at 10:01

## 2019-11-11 RX ADMIN — MUPIROCIN: 20 OINTMENT TOPICAL at 10:04

## 2019-11-11 RX ADMIN — ACETAMINOPHEN 650 MG: 325 TABLET ORAL at 09:58

## 2019-11-11 RX ADMIN — CLOZAPINE 100 MG: 100 TABLET ORAL at 10:03

## 2019-11-11 RX ADMIN — VALPROIC ACID 500 MG: 250 SOLUTION ORAL at 21:39

## 2019-11-11 RX ADMIN — AMMONIUM LACTATE: 12 LOTION TOPICAL at 10:00

## 2019-11-11 RX ADMIN — ARIPIPRAZOLE 15 MG: 5 TABLET ORAL at 21:38

## 2019-11-11 RX ADMIN — PANTOPRAZOLE SODIUM 40 MG: 40 TABLET, DELAYED RELEASE ORAL at 07:12

## 2019-11-11 RX ADMIN — ACETAMINOPHEN 650 MG: 325 TABLET ORAL at 20:19

## 2019-11-11 RX ADMIN — WARFARIN SODIUM 3 MG: 3 TABLET ORAL at 19:48

## 2019-11-11 RX ADMIN — MULTIPLE VITAMINS W/ MINERALS TAB 1 TABLET: TAB at 09:59

## 2019-11-11 RX ADMIN — ATORVASTATIN CALCIUM 10 MG: 10 TABLET, FILM COATED ORAL at 21:38

## 2019-11-11 RX ADMIN — INSULIN GLARGINE 23 UNITS: 100 INJECTION, SOLUTION SUBCUTANEOUS at 21:46

## 2019-11-11 RX ADMIN — INSULIN LISPRO 1 UNITS: 100 INJECTION, SOLUTION INTRAVENOUS; SUBCUTANEOUS at 10:05

## 2019-11-11 RX ADMIN — BUDESONIDE 500 MCG: 0.5 SUSPENSION RESPIRATORY (INHALATION) at 08:07

## 2019-11-11 RX ADMIN — CLOZAPINE 100 MG: 100 TABLET ORAL at 21:41

## 2019-11-11 RX ADMIN — INSULIN LISPRO 1 UNITS: 100 INJECTION, SOLUTION INTRAVENOUS; SUBCUTANEOUS at 21:44

## 2019-11-11 RX ADMIN — INSULIN LISPRO 4 UNITS: 100 INJECTION, SOLUTION INTRAVENOUS; SUBCUTANEOUS at 16:55

## 2019-11-11 RX ADMIN — FORMOTEROL FUMARATE DIHYDRATE 20 MCG: 20 SOLUTION RESPIRATORY (INHALATION) at 08:07

## 2019-11-11 RX ADMIN — VANCOMYCIN HYDROCHLORIDE 1250 MG: 10 INJECTION, POWDER, LYOPHILIZED, FOR SOLUTION INTRAVENOUS at 16:12

## 2019-11-11 RX ADMIN — Medication 10 ML: at 21:41

## 2019-11-11 RX ADMIN — CYANOCOBALAMIN TAB 1000 MCG 1000 MCG: 1000 TAB at 09:59

## 2019-11-11 ASSESSMENT — PAIN SCALES - GENERAL
PAINLEVEL_OUTOF10: 2
PAINLEVEL_OUTOF10: 0
PAINLEVEL_OUTOF10: 4
PAINLEVEL_OUTOF10: 2
PAINLEVEL_OUTOF10: 0
PAINLEVEL_OUTOF10: 5
PAINLEVEL_OUTOF10: 6

## 2019-11-11 ASSESSMENT — ENCOUNTER SYMPTOMS
NAUSEA: 0
SHORTNESS OF BREATH: 0
APNEA: 0
ABDOMINAL PAIN: 0
CHEST TIGHTNESS: 0
VOMITING: 0
WHEEZING: 0
ANAL BLEEDING: 0
STRIDOR: 0
CHOKING: 0
COUGH: 0
ABDOMINAL DISTENTION: 0
COLOR CHANGE: 0

## 2019-11-11 ASSESSMENT — PAIN DESCRIPTION - PAIN TYPE
TYPE: CHRONIC PAIN
TYPE: ACUTE PAIN

## 2019-11-11 ASSESSMENT — PAIN DESCRIPTION - PROGRESSION
CLINICAL_PROGRESSION: GRADUALLY WORSENING
CLINICAL_PROGRESSION: GRADUALLY WORSENING

## 2019-11-11 ASSESSMENT — PAIN DESCRIPTION - ORIENTATION
ORIENTATION: RIGHT;LEFT
ORIENTATION: RIGHT
ORIENTATION: RIGHT;LEFT

## 2019-11-11 ASSESSMENT — PAIN DESCRIPTION - LOCATION
LOCATION: LEG
LOCATION: LEG
LOCATION: KNEE;LEG

## 2019-11-11 ASSESSMENT — PAIN DESCRIPTION - ONSET
ONSET: ON-GOING
ONSET: ON-GOING

## 2019-11-11 ASSESSMENT — PAIN DESCRIPTION - DESCRIPTORS
DESCRIPTORS: ACHING
DESCRIPTORS: SHARP

## 2019-11-11 ASSESSMENT — PAIN DESCRIPTION - FREQUENCY
FREQUENCY: INTERMITTENT
FREQUENCY: CONTINUOUS

## 2019-11-12 ENCOUNTER — APPOINTMENT (OUTPATIENT)
Dept: MRI IMAGING | Age: 64
DRG: 383 | End: 2019-11-12
Payer: COMMERCIAL

## 2019-11-12 ENCOUNTER — APPOINTMENT (OUTPATIENT)
Dept: VASCULAR LAB | Age: 64
DRG: 383 | End: 2019-11-12
Payer: COMMERCIAL

## 2019-11-12 PROBLEM — L02.612 ABSCESS OF LEFT FOOT: Status: RESOLVED | Noted: 2019-11-11 | Resolved: 2019-11-12

## 2019-11-12 PROBLEM — N39.0 UTI (URINARY TRACT INFECTION): Status: RESOLVED | Noted: 2019-11-09 | Resolved: 2019-11-12

## 2019-11-12 LAB
AMMONIA: 42 UMOL/L (ref 11–51)
ANION GAP SERPL CALCULATED.3IONS-SCNC: 11 MMOL/L (ref 3–16)
BASOPHILS ABSOLUTE: 0 K/UL (ref 0–0.2)
BASOPHILS RELATIVE PERCENT: 0.2 %
BUN BLDV-MCNC: 18 MG/DL (ref 7–20)
CALCIUM SERPL-MCNC: 9.3 MG/DL (ref 8.3–10.6)
CHLORIDE BLD-SCNC: 97 MMOL/L (ref 99–110)
CO2: 28 MMOL/L (ref 21–32)
CREAT SERPL-MCNC: 0.8 MG/DL (ref 0.6–1.2)
EOSINOPHILS ABSOLUTE: 0.1 K/UL (ref 0–0.6)
EOSINOPHILS RELATIVE PERCENT: 1.2 %
GFR AFRICAN AMERICAN: >60
GFR NON-AFRICAN AMERICAN: >60
GLUCOSE BLD-MCNC: 167 MG/DL (ref 70–99)
GLUCOSE BLD-MCNC: 198 MG/DL (ref 70–99)
GLUCOSE BLD-MCNC: 234 MG/DL (ref 70–99)
GLUCOSE BLD-MCNC: 236 MG/DL (ref 70–99)
GLUCOSE BLD-MCNC: 265 MG/DL (ref 70–99)
HCT VFR BLD CALC: 35 % (ref 36–48)
HEMOGLOBIN: 11.8 G/DL (ref 12–16)
INR BLD: 2.18 (ref 0.86–1.14)
LYMPHOCYTES ABSOLUTE: 1.8 K/UL (ref 1–5.1)
LYMPHOCYTES RELATIVE PERCENT: 27.1 %
MCH RBC QN AUTO: 30 PG (ref 26–34)
MCHC RBC AUTO-ENTMCNC: 33.6 G/DL (ref 31–36)
MCV RBC AUTO: 89.2 FL (ref 80–100)
MONOCYTES ABSOLUTE: 0.4 K/UL (ref 0–1.3)
MONOCYTES RELATIVE PERCENT: 6.4 %
NEUTROPHILS ABSOLUTE: 4.2 K/UL (ref 1.7–7.7)
NEUTROPHILS RELATIVE PERCENT: 65.1 %
PDW BLD-RTO: 18.4 % (ref 12.4–15.4)
PERFORMED ON: ABNORMAL
PLATELET # BLD: 200 K/UL (ref 135–450)
PMV BLD AUTO: 9 FL (ref 5–10.5)
POTASSIUM REFLEX MAGNESIUM: 5 MMOL/L (ref 3.5–5.1)
PROTHROMBIN TIME: 24.9 SEC (ref 9.8–13)
RBC # BLD: 3.92 M/UL (ref 4–5.2)
SODIUM BLD-SCNC: 136 MMOL/L (ref 136–145)
VALPROIC ACID LEVEL: 40.5 UG/ML (ref 50–100)
VANCOMYCIN TROUGH: 25.1 UG/ML (ref 10–20)
WBC # BLD: 6.5 K/UL (ref 4–11)

## 2019-11-12 PROCEDURE — 82140 ASSAY OF AMMONIA: CPT

## 2019-11-12 PROCEDURE — 99232 SBSQ HOSP IP/OBS MODERATE 35: CPT | Performed by: INTERNAL MEDICINE

## 2019-11-12 PROCEDURE — 1200000000 HC SEMI PRIVATE

## 2019-11-12 PROCEDURE — 6370000000 HC RX 637 (ALT 250 FOR IP): Performed by: HOSPITALIST

## 2019-11-12 PROCEDURE — 94640 AIRWAY INHALATION TREATMENT: CPT

## 2019-11-12 PROCEDURE — 80202 ASSAY OF VANCOMYCIN: CPT

## 2019-11-12 PROCEDURE — A9579 GAD-BASE MR CONTRAST NOS,1ML: HCPCS | Performed by: HOSPITALIST

## 2019-11-12 PROCEDURE — 6370000000 HC RX 637 (ALT 250 FOR IP): Performed by: STUDENT IN AN ORGANIZED HEALTH CARE EDUCATION/TRAINING PROGRAM

## 2019-11-12 PROCEDURE — 6360000002 HC RX W HCPCS: Performed by: HOSPITALIST

## 2019-11-12 PROCEDURE — 85610 PROTHROMBIN TIME: CPT

## 2019-11-12 PROCEDURE — 2580000003 HC RX 258: Performed by: HOSPITALIST

## 2019-11-12 PROCEDURE — 93970 EXTREMITY STUDY: CPT

## 2019-11-12 PROCEDURE — 80048 BASIC METABOLIC PNL TOTAL CA: CPT

## 2019-11-12 PROCEDURE — 70553 MRI BRAIN STEM W/O & W/DYE: CPT

## 2019-11-12 PROCEDURE — 36415 COLL VENOUS BLD VENIPUNCTURE: CPT

## 2019-11-12 PROCEDURE — 6360000002 HC RX W HCPCS: Performed by: INTERNAL MEDICINE

## 2019-11-12 PROCEDURE — 80164 ASSAY DIPROPYLACETIC ACD TOT: CPT

## 2019-11-12 PROCEDURE — 6360000004 HC RX CONTRAST MEDICATION: Performed by: HOSPITALIST

## 2019-11-12 PROCEDURE — 95819 EEG AWAKE AND ASLEEP: CPT

## 2019-11-12 PROCEDURE — 2580000003 HC RX 258: Performed by: INTERNAL MEDICINE

## 2019-11-12 PROCEDURE — 85025 COMPLETE CBC W/AUTO DIFF WBC: CPT

## 2019-11-12 RX ORDER — INSULIN LISPRO 100 [IU]/ML
3 INJECTION, SOLUTION INTRAVENOUS; SUBCUTANEOUS
Status: DISCONTINUED | OUTPATIENT
Start: 2019-11-12 | End: 2019-11-13 | Stop reason: HOSPADM

## 2019-11-12 RX ORDER — INSULIN LISPRO 100 [IU]/ML
4 INJECTION, SOLUTION INTRAVENOUS; SUBCUTANEOUS
Status: DISCONTINUED | OUTPATIENT
Start: 2019-11-12 | End: 2019-11-12

## 2019-11-12 RX ORDER — VALPROIC ACID 250 MG/5ML
500 SOLUTION ORAL 2 TIMES DAILY
Status: DISCONTINUED | OUTPATIENT
Start: 2019-11-12 | End: 2019-11-13 | Stop reason: HOSPADM

## 2019-11-12 RX ORDER — INSULIN LISPRO 100 [IU]/ML
4 INJECTION, SOLUTION INTRAVENOUS; SUBCUTANEOUS
Qty: 5 PEN | Refills: 3 | Status: CANCELLED | OUTPATIENT
Start: 2019-11-12

## 2019-11-12 RX ORDER — VALPROIC ACID 250 MG/5ML
250 SOLUTION ORAL NIGHTLY
Status: DISCONTINUED | OUTPATIENT
Start: 2019-11-12 | End: 2019-11-12

## 2019-11-12 RX ORDER — INSULIN LISPRO 100 [IU]/ML
3 INJECTION, SOLUTION INTRAVENOUS; SUBCUTANEOUS
Qty: 5 PEN | Refills: 3 | Status: CANCELLED | OUTPATIENT
Start: 2019-11-12

## 2019-11-12 RX ORDER — VALPROIC ACID 250 MG/5ML
750 SOLUTION ORAL NIGHTLY
Status: DISCONTINUED | OUTPATIENT
Start: 2019-11-12 | End: 2019-11-13 | Stop reason: HOSPADM

## 2019-11-12 RX ADMIN — ARIPIPRAZOLE 15 MG: 5 TABLET ORAL at 21:45

## 2019-11-12 RX ADMIN — VALPROIC ACID 750 MG: 250 SOLUTION ORAL at 21:47

## 2019-11-12 RX ADMIN — MELATONIN 2000 UNITS: at 21:45

## 2019-11-12 RX ADMIN — Medication 10 ML: at 21:30

## 2019-11-12 RX ADMIN — ATORVASTATIN CALCIUM 10 MG: 10 TABLET, FILM COATED ORAL at 21:45

## 2019-11-12 RX ADMIN — Medication 10 ML: at 11:21

## 2019-11-12 RX ADMIN — INSULIN LISPRO 3 UNITS: 100 INJECTION, SOLUTION INTRAVENOUS; SUBCUTANEOUS at 17:46

## 2019-11-12 RX ADMIN — FORMOTEROL FUMARATE DIHYDRATE 20 MCG: 20 SOLUTION RESPIRATORY (INHALATION) at 20:53

## 2019-11-12 RX ADMIN — GADOTERIDOL 20 ML: 279.3 INJECTION, SOLUTION INTRAVENOUS at 10:42

## 2019-11-12 RX ADMIN — MULTIPLE VITAMINS W/ MINERALS TAB 1 TABLET: TAB at 11:20

## 2019-11-12 RX ADMIN — INSULIN LISPRO 2 UNITS: 100 INJECTION, SOLUTION INTRAVENOUS; SUBCUTANEOUS at 12:40

## 2019-11-12 RX ADMIN — INSULIN LISPRO 1 UNITS: 100 INJECTION, SOLUTION INTRAVENOUS; SUBCUTANEOUS at 17:45

## 2019-11-12 RX ADMIN — BUDESONIDE 500 MCG: 0.5 SUSPENSION RESPIRATORY (INHALATION) at 20:53

## 2019-11-12 RX ADMIN — FERROUS SULFATE TAB 325 MG (65 MG ELEMENTAL FE) 325 MG: 325 (65 FE) TAB at 11:20

## 2019-11-12 RX ADMIN — VANCOMYCIN HYDROCHLORIDE 1500 MG: 10 INJECTION, POWDER, LYOPHILIZED, FOR SOLUTION INTRAVENOUS at 12:37

## 2019-11-12 RX ADMIN — INSULIN LISPRO 2 UNITS: 100 INJECTION, SOLUTION INTRAVENOUS; SUBCUTANEOUS at 21:55

## 2019-11-12 RX ADMIN — AMMONIUM LACTATE: 12 LOTION TOPICAL at 11:21

## 2019-11-12 RX ADMIN — VALPROIC ACID 500 MG: 250 SOLUTION ORAL at 14:38

## 2019-11-12 RX ADMIN — CYANOCOBALAMIN TAB 1000 MCG 1000 MCG: 1000 TAB at 11:20

## 2019-11-12 RX ADMIN — LEVOTHYROXINE SODIUM 37.5 MCG: 75 TABLET ORAL at 07:47

## 2019-11-12 RX ADMIN — PANTOPRAZOLE SODIUM 40 MG: 40 TABLET, DELAYED RELEASE ORAL at 07:49

## 2019-11-12 RX ADMIN — WARFARIN SODIUM 3 MG: 3 TABLET ORAL at 17:44

## 2019-11-12 RX ADMIN — MUPIROCIN: 20 OINTMENT TOPICAL at 11:23

## 2019-11-12 RX ADMIN — CLOZAPINE 100 MG: 100 TABLET ORAL at 21:45

## 2019-11-12 RX ADMIN — INSULIN LISPRO 2 UNITS: 100 INJECTION, SOLUTION INTRAVENOUS; SUBCUTANEOUS at 11:25

## 2019-11-12 RX ADMIN — CLOZAPINE 100 MG: 100 TABLET ORAL at 11:22

## 2019-11-12 RX ADMIN — Medication 10 ML: at 00:41

## 2019-11-12 ASSESSMENT — ENCOUNTER SYMPTOMS
ABDOMINAL DISTENTION: 0
VOMITING: 0
APNEA: 0
CHOKING: 0
STRIDOR: 0
COUGH: 0
ANAL BLEEDING: 0
WHEEZING: 0
ABDOMINAL PAIN: 0
NAUSEA: 0
SHORTNESS OF BREATH: 0
CHEST TIGHTNESS: 0
COLOR CHANGE: 0

## 2019-11-12 ASSESSMENT — PAIN SCALES - GENERAL
PAINLEVEL_OUTOF10: 0
PAINLEVEL_OUTOF10: 0

## 2019-11-13 VITALS
HEIGHT: 66 IN | SYSTOLIC BLOOD PRESSURE: 125 MMHG | HEART RATE: 86 BPM | OXYGEN SATURATION: 97 % | RESPIRATION RATE: 16 BRPM | WEIGHT: 197.09 LBS | DIASTOLIC BLOOD PRESSURE: 77 MMHG | TEMPERATURE: 97.4 F | BODY MASS INDEX: 31.68 KG/M2

## 2019-11-13 LAB
ANION GAP SERPL CALCULATED.3IONS-SCNC: 11 MMOL/L (ref 3–16)
BANDED NEUTROPHILS RELATIVE PERCENT: 0 % (ref 0–7)
BASOPHILS ABSOLUTE: 0 K/UL (ref 0–0.2)
BASOPHILS RELATIVE PERCENT: 0 %
BUN BLDV-MCNC: 23 MG/DL (ref 7–20)
CALCIUM SERPL-MCNC: 9 MG/DL (ref 8.3–10.6)
CHLORIDE BLD-SCNC: 97 MMOL/L (ref 99–110)
CO2: 26 MMOL/L (ref 21–32)
CREAT SERPL-MCNC: 0.7 MG/DL (ref 0.6–1.2)
EOSINOPHILS ABSOLUTE: 0.1 K/UL (ref 0–0.6)
EOSINOPHILS RELATIVE PERCENT: 1 %
GFR AFRICAN AMERICAN: >60
GFR NON-AFRICAN AMERICAN: >60
GLUCOSE BLD-MCNC: 181 MG/DL (ref 70–99)
GLUCOSE BLD-MCNC: 243 MG/DL (ref 70–99)
GLUCOSE BLD-MCNC: 245 MG/DL (ref 70–99)
HCT VFR BLD CALC: 33.9 % (ref 36–48)
HEMOGLOBIN: 11.2 G/DL (ref 12–16)
INR BLD: 1.84 (ref 0.86–1.14)
LYMPHOCYTES ABSOLUTE: 1.7 K/UL (ref 1–5.1)
LYMPHOCYTES RELATIVE PERCENT: 30 %
MCH RBC QN AUTO: 29.9 PG (ref 26–34)
MCHC RBC AUTO-ENTMCNC: 33.1 G/DL (ref 31–36)
MCV RBC AUTO: 90.2 FL (ref 80–100)
MONOCYTES ABSOLUTE: 0.3 K/UL (ref 0–1.3)
MONOCYTES RELATIVE PERCENT: 6 %
NEUTROPHILS ABSOLUTE: 3.5 K/UL (ref 1.7–7.7)
NEUTROPHILS RELATIVE PERCENT: 63 %
PDW BLD-RTO: 18.6 % (ref 12.4–15.4)
PERFORMED ON: ABNORMAL
PERFORMED ON: ABNORMAL
PLATELET # BLD: 212 K/UL (ref 135–450)
PMV BLD AUTO: 8.8 FL (ref 5–10.5)
POTASSIUM REFLEX MAGNESIUM: 4.7 MMOL/L (ref 3.5–5.1)
PROTHROMBIN TIME: 21 SEC (ref 9.8–13)
RBC # BLD: 3.76 M/UL (ref 4–5.2)
SODIUM BLD-SCNC: 134 MMOL/L (ref 136–145)
VALPROIC ACID % FREE: 27 % (ref 5–18.4)
VALPROIC ACID LEVEL: 20 UG/ML (ref 50–125)
VALPROIC ACID, FREE: 5.4 UG/ML (ref 7–23)
VALPROIC DATE LAST DOSE: 0
VALPROIC DOSE AMOUNT: 0
VALPROIC TIME LAST DOSE: 0
WBC # BLD: 5.6 K/UL (ref 4–11)

## 2019-11-13 PROCEDURE — 99232 SBSQ HOSP IP/OBS MODERATE 35: CPT | Performed by: INTERNAL MEDICINE

## 2019-11-13 PROCEDURE — 36415 COLL VENOUS BLD VENIPUNCTURE: CPT

## 2019-11-13 PROCEDURE — 85610 PROTHROMBIN TIME: CPT

## 2019-11-13 PROCEDURE — 2580000003 HC RX 258: Performed by: INTERNAL MEDICINE

## 2019-11-13 PROCEDURE — 6360000002 HC RX W HCPCS: Performed by: INTERNAL MEDICINE

## 2019-11-13 PROCEDURE — 6370000000 HC RX 637 (ALT 250 FOR IP): Performed by: HOSPITALIST

## 2019-11-13 PROCEDURE — 6360000002 HC RX W HCPCS: Performed by: HOSPITALIST

## 2019-11-13 PROCEDURE — 6370000000 HC RX 637 (ALT 250 FOR IP): Performed by: STUDENT IN AN ORGANIZED HEALTH CARE EDUCATION/TRAINING PROGRAM

## 2019-11-13 PROCEDURE — 94640 AIRWAY INHALATION TREATMENT: CPT

## 2019-11-13 PROCEDURE — 85025 COMPLETE CBC W/AUTO DIFF WBC: CPT

## 2019-11-13 PROCEDURE — 80048 BASIC METABOLIC PNL TOTAL CA: CPT

## 2019-11-13 PROCEDURE — 2580000003 HC RX 258: Performed by: HOSPITALIST

## 2019-11-13 RX ORDER — VALPROIC ACID 250 MG/5ML
500 SOLUTION ORAL 2 TIMES DAILY
Qty: 600 ML | Refills: 0 | Status: SHIPPED | OUTPATIENT
Start: 2019-11-13

## 2019-11-13 RX ORDER — LEVOTHYROXINE SODIUM 0.07 MG/1
37.5 TABLET ORAL DAILY
Qty: 30 TABLET | Refills: 3 | Status: SHIPPED | OUTPATIENT
Start: 2019-11-13

## 2019-11-13 RX ORDER — DOXYCYCLINE HYCLATE 100 MG
100 TABLET ORAL 2 TIMES DAILY
Qty: 20 TABLET | Refills: 0 | Status: SHIPPED | OUTPATIENT
Start: 2019-11-13 | End: 2019-11-23

## 2019-11-13 RX ORDER — VALPROIC ACID 250 MG/5ML
750 SOLUTION ORAL NIGHTLY
Qty: 600 ML | Refills: 0 | Status: SHIPPED | OUTPATIENT
Start: 2019-11-13

## 2019-11-13 RX ADMIN — LEVOTHYROXINE SODIUM 37.5 MCG: 75 TABLET ORAL at 05:13

## 2019-11-13 RX ADMIN — CLOZAPINE 100 MG: 100 TABLET ORAL at 10:32

## 2019-11-13 RX ADMIN — CYANOCOBALAMIN TAB 1000 MCG 1000 MCG: 1000 TAB at 10:32

## 2019-11-13 RX ADMIN — FERROUS SULFATE TAB 325 MG (65 MG ELEMENTAL FE) 325 MG: 325 (65 FE) TAB at 10:32

## 2019-11-13 RX ADMIN — FORMOTEROL FUMARATE DIHYDRATE 20 MCG: 20 SOLUTION RESPIRATORY (INHALATION) at 08:41

## 2019-11-13 RX ADMIN — INSULIN LISPRO 3 UNITS: 100 INJECTION, SOLUTION INTRAVENOUS; SUBCUTANEOUS at 08:52

## 2019-11-13 RX ADMIN — INSULIN LISPRO 2 UNITS: 100 INJECTION, SOLUTION INTRAVENOUS; SUBCUTANEOUS at 12:32

## 2019-11-13 RX ADMIN — Medication 10 ML: at 10:33

## 2019-11-13 RX ADMIN — INSULIN LISPRO 3 UNITS: 100 INJECTION, SOLUTION INTRAVENOUS; SUBCUTANEOUS at 12:32

## 2019-11-13 RX ADMIN — MULTIPLE VITAMINS W/ MINERALS TAB 1 TABLET: TAB at 10:32

## 2019-11-13 RX ADMIN — VANCOMYCIN HYDROCHLORIDE 1500 MG: 10 INJECTION, POWDER, LYOPHILIZED, FOR SOLUTION INTRAVENOUS at 12:28

## 2019-11-13 RX ADMIN — VALPROIC ACID 500 MG: 250 SOLUTION ORAL at 15:00

## 2019-11-13 RX ADMIN — INSULIN LISPRO 1 UNITS: 100 INJECTION, SOLUTION INTRAVENOUS; SUBCUTANEOUS at 08:51

## 2019-11-13 RX ADMIN — PANTOPRAZOLE SODIUM 40 MG: 40 TABLET, DELAYED RELEASE ORAL at 05:13

## 2019-11-13 RX ADMIN — Medication 10 ML: at 10:32

## 2019-11-13 RX ADMIN — VALPROIC ACID 500 MG: 250 SOLUTION ORAL at 10:32

## 2019-11-13 RX ADMIN — BUDESONIDE 500 MCG: 0.5 SUSPENSION RESPIRATORY (INHALATION) at 08:41

## 2019-11-13 ASSESSMENT — PAIN SCALES - GENERAL
PAINLEVEL_OUTOF10: 0

## 2019-11-14 LAB
BLOOD CULTURE, ROUTINE: NORMAL
CULTURE, BLOOD 2: NORMAL

## 2019-11-15 LAB
BLOOD CULTURE, ROUTINE: NORMAL
CULTURE, BLOOD 2: NORMAL

## 2020-10-21 ENCOUNTER — HOSPITAL ENCOUNTER (EMERGENCY)
Age: 65
Discharge: NURSING, REHAB OR LONG-TERM CARE | End: 2020-10-21
Attending: EMERGENCY MEDICINE
Payer: MEDICAID

## 2020-10-21 ENCOUNTER — APPOINTMENT (OUTPATIENT)
Dept: GENERAL RADIOLOGY | Age: 65
End: 2020-10-21
Payer: MEDICAID

## 2020-10-21 VITALS
HEART RATE: 102 BPM | BODY MASS INDEX: 31.66 KG/M2 | DIASTOLIC BLOOD PRESSURE: 59 MMHG | TEMPERATURE: 97.3 F | HEIGHT: 66 IN | SYSTOLIC BLOOD PRESSURE: 107 MMHG | OXYGEN SATURATION: 93 % | WEIGHT: 197 LBS | RESPIRATION RATE: 16 BRPM

## 2020-10-21 LAB
ANION GAP SERPL CALCULATED.3IONS-SCNC: 10 MMOL/L (ref 3–16)
BASOPHILS ABSOLUTE: 0 K/UL (ref 0–0.2)
BASOPHILS RELATIVE PERCENT: 0.8 %
BUN BLDV-MCNC: 17 MG/DL (ref 7–20)
CALCIUM SERPL-MCNC: 8.9 MG/DL (ref 8.3–10.6)
CHLORIDE BLD-SCNC: 95 MMOL/L (ref 99–110)
CO2: 33 MMOL/L (ref 21–32)
CREAT SERPL-MCNC: 1 MG/DL (ref 0.6–1.2)
EKG ATRIAL RATE: 81 BPM
EKG DIAGNOSIS: NORMAL
EKG P AXIS: 59 DEGREES
EKG P-R INTERVAL: 144 MS
EKG Q-T INTERVAL: 420 MS
EKG QRS DURATION: 98 MS
EKG QTC CALCULATION (BAZETT): 487 MS
EKG R AXIS: -58 DEGREES
EKG T AXIS: 44 DEGREES
EKG VENTRICULAR RATE: 81 BPM
EOSINOPHILS ABSOLUTE: 0.1 K/UL (ref 0–0.6)
EOSINOPHILS RELATIVE PERCENT: 1.1 %
GFR AFRICAN AMERICAN: >60
GFR NON-AFRICAN AMERICAN: 56
GLUCOSE BLD-MCNC: 314 MG/DL (ref 70–99)
HCT VFR BLD CALC: 33.2 % (ref 36–48)
HEMOGLOBIN: 10.9 G/DL (ref 12–16)
INR BLD: 3.14 (ref 0.86–1.14)
LV EF: 78 %
LVEF MODALITY: NORMAL
LYMPHOCYTES ABSOLUTE: 1.5 K/UL (ref 1–5.1)
LYMPHOCYTES RELATIVE PERCENT: 28.7 %
MCH RBC QN AUTO: 29.7 PG (ref 26–34)
MCHC RBC AUTO-ENTMCNC: 32.9 G/DL (ref 31–36)
MCV RBC AUTO: 90.3 FL (ref 80–100)
MONOCYTES ABSOLUTE: 0.4 K/UL (ref 0–1.3)
MONOCYTES RELATIVE PERCENT: 8.7 %
NEUTROPHILS ABSOLUTE: 3.1 K/UL (ref 1.7–7.7)
NEUTROPHILS RELATIVE PERCENT: 60.7 %
PDW BLD-RTO: 20.3 % (ref 12.4–15.4)
PLATELET # BLD: 357 K/UL (ref 135–450)
PMV BLD AUTO: 7.8 FL (ref 5–10.5)
POTASSIUM REFLEX MAGNESIUM: 3.9 MMOL/L (ref 3.5–5.1)
PRO-BNP: 111 PG/ML (ref 0–124)
PROTHROMBIN TIME: 36.9 SEC (ref 10–13.2)
RBC # BLD: 3.68 M/UL (ref 4–5.2)
SODIUM BLD-SCNC: 138 MMOL/L (ref 136–145)
TROPONIN: <0.01 NG/ML
TROPONIN: <0.01 NG/ML
WBC # BLD: 5.1 K/UL (ref 4–11)

## 2020-10-21 PROCEDURE — 78452 HT MUSCLE IMAGE SPECT MULT: CPT

## 2020-10-21 PROCEDURE — 85610 PROTHROMBIN TIME: CPT

## 2020-10-21 PROCEDURE — 85025 COMPLETE CBC W/AUTO DIFF WBC: CPT

## 2020-10-21 PROCEDURE — 2580000003 HC RX 258: Performed by: EMERGENCY MEDICINE

## 2020-10-21 PROCEDURE — 99285 EMERGENCY DEPT VISIT HI MDM: CPT

## 2020-10-21 PROCEDURE — 93005 ELECTROCARDIOGRAM TRACING: CPT | Performed by: EMERGENCY MEDICINE

## 2020-10-21 PROCEDURE — A9502 TC99M TETROFOSMIN: HCPCS | Performed by: EMERGENCY MEDICINE

## 2020-10-21 PROCEDURE — 6370000000 HC RX 637 (ALT 250 FOR IP): Performed by: EMERGENCY MEDICINE

## 2020-10-21 PROCEDURE — 71046 X-RAY EXAM CHEST 2 VIEWS: CPT

## 2020-10-21 PROCEDURE — 80048 BASIC METABOLIC PNL TOTAL CA: CPT

## 2020-10-21 PROCEDURE — 3430000000 HC RX DIAGNOSTIC RADIOPHARMACEUTICAL: Performed by: EMERGENCY MEDICINE

## 2020-10-21 PROCEDURE — 6360000002 HC RX W HCPCS: Performed by: EMERGENCY MEDICINE

## 2020-10-21 PROCEDURE — 96374 THER/PROPH/DIAG INJ IV PUSH: CPT

## 2020-10-21 PROCEDURE — 83880 ASSAY OF NATRIURETIC PEPTIDE: CPT

## 2020-10-21 PROCEDURE — 84484 ASSAY OF TROPONIN QUANT: CPT

## 2020-10-21 PROCEDURE — 93017 CV STRESS TEST TRACING ONLY: CPT

## 2020-10-21 RX ORDER — ACETAMINOPHEN 325 MG/1
650 TABLET ORAL ONCE
Status: COMPLETED | OUTPATIENT
Start: 2020-10-21 | End: 2020-10-21

## 2020-10-21 RX ORDER — SODIUM CHLORIDE 0.9 % (FLUSH) 0.9 %
10 SYRINGE (ML) INJECTION 2 TIMES DAILY
Status: DISCONTINUED | OUTPATIENT
Start: 2020-10-21 | End: 2020-10-21 | Stop reason: HOSPADM

## 2020-10-21 RX ORDER — ONDANSETRON 4 MG/1
4 TABLET, FILM COATED ORAL EVERY 8 HOURS PRN
Qty: 20 TABLET | Refills: 0 | Status: SHIPPED | OUTPATIENT
Start: 2020-10-21

## 2020-10-21 RX ADMIN — Medication 10 ML: at 11:15

## 2020-10-21 RX ADMIN — TETROFOSMIN 9.1 MILLICURIE: 1.38 INJECTION, POWDER, LYOPHILIZED, FOR SOLUTION INTRAVENOUS at 11:15

## 2020-10-21 RX ADMIN — REGADENOSON 0.4 MG: 0.08 INJECTION, SOLUTION INTRAVENOUS at 12:10

## 2020-10-21 RX ADMIN — TETROFOSMIN 30.2 MILLICURIE: 1.38 INJECTION, POWDER, LYOPHILIZED, FOR SOLUTION INTRAVENOUS at 12:10

## 2020-10-21 RX ADMIN — ACETAMINOPHEN 650 MG: 325 TABLET ORAL at 13:56

## 2020-10-21 RX ADMIN — Medication 10 ML: at 12:10

## 2020-10-21 ASSESSMENT — ENCOUNTER SYMPTOMS
SHORTNESS OF BREATH: 1
DIARRHEA: 0
VOMITING: 0
NAUSEA: 1
COUGH: 0
ABDOMINAL PAIN: 0

## 2020-10-21 ASSESSMENT — PAIN DESCRIPTION - LOCATION
LOCATION: CHEST
LOCATION: CHEST

## 2020-10-21 ASSESSMENT — PAIN SCALES - GENERAL
PAINLEVEL_OUTOF10: 5
PAINLEVEL_OUTOF10: 5
PAINLEVEL_OUTOF10: 8

## 2020-10-21 ASSESSMENT — PAIN DESCRIPTION - PAIN TYPE: TYPE: ACUTE PAIN

## 2020-10-21 NOTE — ED NOTES
2nd troponin drawn and sent. Pt updated on POC. Call light in reach will continue to monitor.       Hortensia Mahmood RN  10/21/20 [id number]

## 2020-10-21 NOTE — ED PROVIDER NOTES
cigarettes. She smoked 0.50 packs per day. She has never used smokeless tobacco. She reports that she does not drink alcohol or use drugs. Medications     Previous Medications    ARIPIPRAZOLE (ABILIFY) 15 MG TABLET    Take 15 mg by mouth nightly     ATORVASTATIN (LIPITOR) 10 MG TABLET    Take 10 mg by mouth nightly     CHOLECALCIFEROL (VITAMIN D) 2000 UNITS TABS TABLET    Take 2,000 Units by mouth nightly     CLOZAPINE (CLOZARIL) 100 MG TABLET    Take 100 mg by mouth 2 times daily     FERROUS SULFATE 325 (65 FE) MG TABLET    Take 325 mg by mouth daily (with breakfast)    FLUTICASONE-SALMETEROL (ADVAIR) 250-50 MCG/DOSE AEPB    Inhale 1 puff into the lungs every 12 hours    INSULIN GLARGINE (LANTUS) 100 UNIT/ML INJECTION PEN    Inject 25 Units into the skin nightly    LEVOTHYROXINE (SYNTHROID) 75 MCG TABLET    Take 0.5 tablets by mouth Daily    OMEPRAZOLE (PRILOSEC) 40 MG DELAYED RELEASE CAPSULE    Take 40 mg by mouth daily    PROMETHAZINE-DEXTROMETHORPHAN (PROMETHAZINE-DM) 6.25-15 MG/5ML SYRUP    Take by mouth 4 times daily as needed for Cough    THERAPEUTIC MULTIVITAMIN-MINERALS (THERAGRAN-M) TABLET    Take 1 tablet by mouth daily. VALPROIC ACID (DEPACON) 250 MG/5ML SOLN ORAL SOLUTION    Take 10 mLs by mouth 2 times daily    VALPROIC ACID (DEPACON) 250 MG/5ML SOLN ORAL SOLUTION    Take 15 mLs by mouth nightly    VITAMIN B-12 (CYANOCOBALAMIN) 1000 MCG TABLET    Take 1,000 mcg by mouth daily     WARFARIN (COUMADIN) 3 MG TABLET    Take 3 mg by mouth every evening       Allergies     She has No Known Allergies. Physical Exam     INITIAL VITALS: BP: (!) 105/48, Temp: 97.3 °F (36.3 °C), Pulse: 92, Resp: 18, SpO2: 96 %   Physical Exam  Vitals signs and nursing note reviewed. Constitutional:       General: She is not in acute distress. Appearance: She is well-developed. She is not diaphoretic. Cardiovascular:      Rate and Rhythm: Normal rate and regular rhythm.    Pulmonary:      Effort: Pulmonary effort is normal.      Breath sounds: Normal breath sounds. Chest:      Chest wall: Tenderness present. Abdominal:      General: Bowel sounds are normal. There is no distension. Palpations: Abdomen is soft. Tenderness: There is no abdominal tenderness. Skin:     General: Skin is warm and dry. Neurological:      Mental Status: She is alert and oriented to person, place, and time. Psychiatric:         Behavior: Behavior normal.         Diagnostic Results     EKG   Interpreted by Salbador Ness MD     Rhythm: normal sinus   Rate: normal  Axis: Left  Ectopy: none  Conduction:   ST Segments: no acute change  T Waves: Unchanged precordial T wave inversions  Q Waves: none    Clinical Impression: normal sinus rhythm with no acute changes      RADIOLOGY:  NM Cardiac Stress Test Nuclear Imaging   Final Result   Overall findings represent a low risk scan.    Normal LV size and systolic function.    Small apical defect likely artifact, otherwise normal stress perfusion.    Significant bowel uptake.    ECG: Non-diagnostic EKG response due to failure to reach target heart rate. XR CHEST (2 VW)   Final Result   1. Limited exam due to large body habitus. 2. Borderline cardiomegaly with pulmonary venous congestion.           LABS:   Results for orders placed or performed during the hospital encounter of 04/56/60   Basic Metabolic Panel w/ Reflex to MG   Result Value Ref Range    Sodium 138 136 - 145 mmol/L    Potassium reflex Magnesium 3.9 3.5 - 5.1 mmol/L    Chloride 95 (L) 99 - 110 mmol/L    CO2 33 (H) 21 - 32 mmol/L    Anion Gap 10 3 - 16    Glucose 314 (H) 70 - 99 mg/dL    BUN 17 7 - 20 mg/dL    CREATININE 1.0 0.6 - 1.2 mg/dL    GFR Non- 56 (A) >60    GFR African American >60 >60    Calcium 8.9 8.3 - 10.6 mg/dL   Troponin   Result Value Ref Range    Troponin <0.01 <0.01 ng/mL   Brain Natriuretic Peptide   Result Value Ref Range    Pro- 0 - 124 pg/mL   CBC Auto Differential Result Value Ref Range    WBC 5.1 4.0 - 11.0 K/uL    RBC 3.68 (L) 4.00 - 5.20 M/uL    Hemoglobin 10.9 (L) 12.0 - 16.0 g/dL    Hematocrit 33.2 (L) 36.0 - 48.0 %    MCV 90.3 80.0 - 100.0 fL    MCH 29.7 26.0 - 34.0 pg    MCHC 32.9 31.0 - 36.0 g/dL    RDW 20.3 (H) 12.4 - 15.4 %    Platelets 961 430 - 432 K/uL    MPV 7.8 5.0 - 10.5 fL    Neutrophils % 60.7 %    Lymphocytes % 28.7 %    Monocytes % 8.7 %    Eosinophils % 1.1 %    Basophils % 0.8 %    Neutrophils Absolute 3.1 1.7 - 7.7 K/uL    Lymphocytes Absolute 1.5 1.0 - 5.1 K/uL    Monocytes Absolute 0.4 0.0 - 1.3 K/uL    Eosinophils Absolute 0.1 0.0 - 0.6 K/uL    Basophils Absolute 0.0 0.0 - 0.2 K/uL   Protime-INR   Result Value Ref Range    Protime 36.9 (H) 10.0 - 13.2 sec    INR 3.14 (H) 0.86 - 1.14   Troponin (lab)   Result Value Ref Range    Troponin <0.01 <0.01 ng/mL   EKG 12 Lead   Result Value Ref Range    Ventricular Rate 81 BPM    Atrial Rate 81 BPM    P-R Interval 144 ms    QRS Duration 98 ms    Q-T Interval 420 ms    QTc Calculation (Bazett) 487 ms    P Axis 59 degrees    R Axis -58 degrees    T Axis 44 degrees    Diagnosis       EKG performed in ER and to be interpreted by ER physician. Confirmed by MD, ER ([id number]),  Sekou Osorio ([id number]) on 10/21/2020 7:40:36 AM       RECENT VITALS:  BP: 100/63,Temp: 97.3 °F (36.3 °C), Pulse: 100, Resp: 16, SpO2: 93 %       ED Course     Nursing Notes, Past Medical Hx, Past Surgical Hx, Social Hx,Allergies, and Family Hx were reviewed.     patient was given the following medications:  Orders Placed This Encounter   Medications    regadenoson (LEXISCAN) injection 0.4 mg    technetium tetrofosmin (Tc-MYOVIEW) injection 10 millicurie    technetium tetrofosmin (Tc-MYOVIEW) injection 30 millicurie    sodium chloride flush 0.9 % injection 10 mL    acetaminophen (TYLENOL) tablet 650 mg    ondansetron (ZOFRAN) 4 MG tablet     Sig: Take 1 tablet by mouth every 8 hours as needed for Nausea     Dispense:  20 tablet Refill:  0       CONSULTS:  None    MEDICAL DECISIONMAKING / ASSESSMENT / iVdal Vidal is a 63 y.o. female with multiple medical problems presenting with heaviness in her chest described as an elephant on her chest.  Patient's pain is completely reproducible on examination and reproduces her described pain. I suspect musculoskeletal chest wall pain. However given the patient's risk factors outlined above, I felt further cardiac risk stratification will be necessary and therefore a second troponin and stress test were performed and these are both unremarkable. Given the reproducible nature of the patient's chest pain, was felt that she could be treated for chest wall pain with Tylenol. She cannot be on a inflammatories due to her anticoagulation therefore Tylenol will be recommended. Clinical Impression     1.  Chest wall pain        Disposition     PATIENT REFERRED TO:  JC Guillen - [facility]  [address]  [phone]    In 1 week        DISCHARGE MEDICATIONS:  New Prescriptions    ONDANSETRON (ZOFRAN) 4 MG TABLET    Take 1 tablet by mouth every 8 hours as needed for Nausea       DISPOSITION         Giovanna Shea MD  10/21/20 7530

## 2020-10-21 NOTE — ED NOTES
IV placed labs drawn. Pt given call light  To remind her to call out with needs. Will continue to monitor.       Karlie Fletcher RN  10/21/20 2970

## 2020-10-21 NOTE — ED NOTES
Pt resting in bed. Spoke with Cardiology and stated they will send for her for her stress test. Will continue to monitor.       Marcia Abdullahi RN  10/21/20 7247

## 2020-10-21 NOTE — ED NOTES
Bed: A04-04  Expected date:   Expected time:   Means of arrival:   Comments:  43 Bao Leavitt RN  10/21/20 0652

## 2020-10-21 NOTE — ED NOTES
Pt resting bed requesting something to eat. Pt reminded to stay NPO until results.       Gualberto Rivera RN  10/21/20 0734

## 2020-10-21 NOTE — ED NOTES
Pt given boxed lunch pt took of BP and pulse ox to eat at this time. call light in reach will continue to monitor.       Aki Bennett RN  10/21/20 Ledy Mota RN  10/21/20 9828

## 2020-10-21 NOTE — ED NOTES
Attempted to call report to [facility] Left message on [employer]. Gave a call back for questions. Pt updated on POC. Will continue to monitor.       Horace Montesinos, HEBERT  10/21/20 3197

## 2020-10-21 NOTE — ED TRIAGE NOTES
Pt is alert and oriented times four. Pt brought in by squad from [facility] with c/o Chest pain. Pt states he CP started approx 0500. Pt also states that she was nauseated Per report from facility stated pt received tylenol and zofran and 3 Nitro tablets. Pt rates her pain at this time a 8 out of 10. EKG completed. Pt placed on monitor. Call light in reach will continue to monitor.

## 2020-10-21 NOTE — ED NOTES
Patient back from stress lab. C/o headache and nausea, Dr. Malik Amezcua aware.       General Jodie RN  10/21/20 2968

## 2021-04-30 NOTE — PLAN OF CARE
Completed cognitive-linguistic assessment. Please see report in electronic medical record.       Lorie Flannery M.A., [facility]  Speech-Language Pathologist 30-Apr-2021